# Patient Record
Sex: MALE | Race: WHITE | NOT HISPANIC OR LATINO | Employment: OTHER | URBAN - METROPOLITAN AREA
[De-identification: names, ages, dates, MRNs, and addresses within clinical notes are randomized per-mention and may not be internally consistent; named-entity substitution may affect disease eponyms.]

---

## 2017-03-22 ENCOUNTER — HISTORICAL (OUTPATIENT)
Dept: ADMINISTRATIVE | Facility: HOSPITAL | Age: 37
End: 2017-03-22

## 2017-03-23 ENCOUNTER — HOSPITAL ENCOUNTER (EMERGENCY)
Facility: HOSPITAL | Age: 37
Discharge: HOME | End: 2017-03-23
Attending: EMERGENCY MEDICINE | Admitting: EMERGENCY MEDICINE
Payer: MEDICARE

## 2017-03-23 VITALS
TEMPERATURE: 99 F | RESPIRATION RATE: 17 BRPM | OXYGEN SATURATION: 97 % | HEIGHT: 72 IN | WEIGHT: 182.5 LBS | SYSTOLIC BLOOD PRESSURE: 165 MMHG | BODY MASS INDEX: 24.72 KG/M2 | HEART RATE: 88 BPM | DIASTOLIC BLOOD PRESSURE: 89 MMHG

## 2017-03-23 DIAGNOSIS — K31.84 GASTROPARESIS: Primary | ICD-10-CM

## 2017-03-23 LAB
ALBUMIN SERPL BCP-MCNC: 4.4 G/DL
ALP SERPL-CCNC: 79 U/L
ALT SERPL W/O P-5'-P-CCNC: 19 U/L
ANION GAP SERPL CALC-SCNC: 15 MMOL/L
AST SERPL-CCNC: 19 U/L
BASOPHILS # BLD AUTO: 0.03 K/UL
BASOPHILS NFR BLD: 0.2 %
BILIRUB SERPL-MCNC: 0.4 MG/DL
BUN SERPL-MCNC: 12 MG/DL
CALCIUM SERPL-MCNC: 10 MG/DL
CHLORIDE SERPL-SCNC: 102 MMOL/L
CO2 SERPL-SCNC: 25 MMOL/L
CREAT SERPL-MCNC: 1 MG/DL
DIFFERENTIAL METHOD: ABNORMAL
EOSINOPHIL # BLD AUTO: 0 K/UL
EOSINOPHIL NFR BLD: 0.1 %
ERYTHROCYTE [DISTWIDTH] IN BLOOD BY AUTOMATED COUNT: 15.6 %
EST. GFR  (AFRICAN AMERICAN): >60 ML/MIN/1.73 M^2
EST. GFR  (NON AFRICAN AMERICAN): >60 ML/MIN/1.73 M^2
GLUCOSE SERPL-MCNC: 113 MG/DL
HCT VFR BLD AUTO: 34.3 %
HGB BLD-MCNC: 11.1 G/DL
LIPASE SERPL-CCNC: 21 U/L
LYMPHOCYTES # BLD AUTO: 2.2 K/UL
LYMPHOCYTES NFR BLD: 16.2 %
MCH RBC QN AUTO: 26.4 PG
MCHC RBC AUTO-ENTMCNC: 32.4 %
MCV RBC AUTO: 82 FL
MONOCYTES # BLD AUTO: 1.1 K/UL
MONOCYTES NFR BLD: 8.1 %
NEUTROPHILS # BLD AUTO: 10.1 K/UL
NEUTROPHILS NFR BLD: 75.3 %
PLATELET # BLD AUTO: 516 K/UL
PMV BLD AUTO: 9.8 FL
POTASSIUM SERPL-SCNC: 3.7 MMOL/L
PROT SERPL-MCNC: 7.9 G/DL
RBC # BLD AUTO: 4.2 M/UL
SODIUM SERPL-SCNC: 142 MMOL/L
WBC # BLD AUTO: 13.42 K/UL

## 2017-03-23 PROCEDURE — 63600175 PHARM REV CODE 636 W HCPCS: Performed by: EMERGENCY MEDICINE

## 2017-03-23 PROCEDURE — 85025 COMPLETE CBC W/AUTO DIFF WBC: CPT

## 2017-03-23 PROCEDURE — 96365 THER/PROPH/DIAG IV INF INIT: CPT

## 2017-03-23 PROCEDURE — 99284 EMERGENCY DEPT VISIT MOD MDM: CPT | Mod: 25

## 2017-03-23 PROCEDURE — 96375 TX/PRO/DX INJ NEW DRUG ADDON: CPT

## 2017-03-23 PROCEDURE — 25000003 PHARM REV CODE 250: Performed by: EMERGENCY MEDICINE

## 2017-03-23 PROCEDURE — 99283 EMERGENCY DEPT VISIT LOW MDM: CPT | Mod: ,,, | Performed by: EMERGENCY MEDICINE

## 2017-03-23 PROCEDURE — 80053 COMPREHEN METABOLIC PANEL: CPT

## 2017-03-23 PROCEDURE — 83690 ASSAY OF LIPASE: CPT

## 2017-03-23 RX ORDER — HYDROMORPHONE HYDROCHLORIDE 1 MG/ML
1 INJECTION, SOLUTION INTRAMUSCULAR; INTRAVENOUS; SUBCUTANEOUS
Status: COMPLETED | OUTPATIENT
Start: 2017-03-23 | End: 2017-03-23

## 2017-03-23 RX ORDER — METOCLOPRAMIDE 10 MG/1
10 TABLET ORAL 2 TIMES DAILY PRN
COMMUNITY
End: 2022-09-14 | Stop reason: SDUPTHER

## 2017-03-23 RX ORDER — HEPARIN SODIUM 5000 [USP'U]/ML
5000 INJECTION, SOLUTION INTRAVENOUS; SUBCUTANEOUS
Status: DISCONTINUED | OUTPATIENT
Start: 2017-03-23 | End: 2017-03-23

## 2017-03-23 RX ORDER — ESOMEPRAZOLE MAGNESIUM 40 MG/1
40 CAPSULE, DELAYED RELEASE ORAL 2 TIMES DAILY
COMMUNITY
End: 2022-08-02

## 2017-03-23 RX ORDER — METOCLOPRAMIDE HYDROCHLORIDE 5 MG/ML
10 INJECTION INTRAMUSCULAR; INTRAVENOUS
Status: COMPLETED | OUTPATIENT
Start: 2017-03-23 | End: 2017-03-23

## 2017-03-23 RX ORDER — PROMETHAZINE HYDROCHLORIDE 6.25 MG/5ML
25 SYRUP ORAL EVERY 6 HOURS PRN
COMMUNITY
End: 2022-05-31

## 2017-03-23 RX ORDER — VARENICLINE TARTRATE 1 MG/1
1 TABLET, FILM COATED ORAL 2 TIMES DAILY
COMMUNITY
End: 2022-06-28 | Stop reason: ALTCHOICE

## 2017-03-23 RX ORDER — ERGOCALCIFEROL 1.25 MG/1
1000 CAPSULE ORAL DAILY
COMMUNITY
End: 2022-06-28

## 2017-03-23 RX ORDER — HEPARIN 100 UNIT/ML
100 SYRINGE INTRAVENOUS
Status: DISCONTINUED | OUTPATIENT
Start: 2017-03-23 | End: 2017-03-23 | Stop reason: HOSPADM

## 2017-03-23 RX ADMIN — HEPARIN 100 UNITS: 100 SYRINGE at 08:03

## 2017-03-23 RX ADMIN — PROMETHAZINE HYDROCHLORIDE 25 MG: 25 INJECTION, SOLUTION INTRAMUSCULAR; INTRAVENOUS at 05:03

## 2017-03-23 RX ADMIN — HYDROMORPHONE HYDROCHLORIDE 1 MG: 1 INJECTION, SOLUTION INTRAMUSCULAR; INTRAVENOUS; SUBCUTANEOUS at 05:03

## 2017-03-23 RX ADMIN — SODIUM CHLORIDE 1000 ML: 0.9 INJECTION, SOLUTION INTRAVENOUS at 05:03

## 2017-03-23 RX ADMIN — METOCLOPRAMIDE 10 MG: 5 INJECTION, SOLUTION INTRAMUSCULAR; INTRAVENOUS at 05:03

## 2017-03-23 NOTE — ED AVS SNAPSHOT
[facility]  [address]  [facility] [id number]               Claude Hall   3/23/2017  3:49 AM   ED    Description:  Male : 1980   Department:  [facility]           Your Care was Coordinated By:     Provider Role From To    Odilon Cazares MD Attending Provider 17 0415 --      Reason for Visit     Abdominal Pain           Diagnoses this Visit        Comments    Gastroparesis    -  Primary       ED Disposition     None           To Do List           Follow-up Information     Follow up with your doctor. Call in 1 week.    Why:  As needed      [facility]     [facility] -  Assistance  Registered nurses in the [facility] provide clinical advisement, health education, appointment booking, and other advisory services.  Call for this free service at [phone].             Medications           Message regarding Medications     Verify the changes and/or additions to your medication regime listed below are the same as discussed with your clinician today.  If any of these changes or additions are incorrect, please notify your healthcare provider.        These medications were administered today        Dose Freq    metoclopramide HCl injection 10 mg 10 mg ED 1 Time    Sig: Inject 2 mLs (10 mg total) into the vein ED 1 Time.    Class: Normal    Route: Intravenous    promethazine (PHENERGAN) 25 mg in dextrose 5 % 50 mL IVPB 25 mg ED 1 Time    Sig: Inject 25 mg into the vein ED 1 Time.    Class: Normal    Route: Intravenous    hydromorphone (PF) injection 1 mg 1 mg ED 1 Time    Sig: Inject 1 mL (1 mg total) into the vein ED 1 Time.    Class: Normal    Route: Intravenous    sodium chloride 0.9% bolus 1,000 mL 1,000 mL ED 1 Time    Sig: Inject 1,000 mLs into the vein ED 1 Time.    Class: Normal    Route: Intravenous           Verify that the below list of medications is an accurate representation of the  medications you are currently taking.  If none reported, the list may be blank. If incorrect, please contact your healthcare provider. Carry this list with you in case of emergency.           Current Medications     ibgahjxisi-svwkqdwp-kwclve ala (ODEFSEY) 200-25-25 mg Tab Take by mouth once daily.    ergocalciferol (VITAMIN D2) 50,000 unit Cap Take 1,000 Units by mouth once daily.    esomeprazole (NEXIUM) 40 MG capsule Take 40 mg by mouth 2 (two) times daily.    hydrocodone-acetaminophen  (LORTAB)  mg per tablet Take 1 tablet by mouth every 6 (six) hours as needed for Pain.    metoclopramide HCl (REGLAN) 10 MG tablet Take 10 mg by mouth 2 (two) times daily as needed.    promethazine (PHENERGAN) 6.25 mg/5 mL syrup Take 25 mg by mouth every 6 (six) hours as needed for Nausea.    varenicline (CHANTIX) 1 mg Tab Take 1 mg by mouth 2 (two) times daily.           Clinical Reference Information           Your Vitals Were     BP Pulse Temp Resp Height Weight    171/97 94 98.8 °F (37.1 °C) (Oral) 18 6' (1.829 m) 82.8 kg (182 lb 8 oz)    SpO2 BMI             98% 24.75 kg/m2         Allergies as of 3/23/2017        Reactions    Ativan [Lorazepam] Other (See Comments)    Twitching, involuntary movements.       Immunizations Administered on Date of Encounter - 3/23/2017     None      ED Micro, Lab, POCT     Start Ordered       Status Ordering Provider    03/23/17 0458 03/23/17 0457  Lipase  STAT      Final result     03/23/17 0457 03/23/17 0457  CBC auto differential  STAT      Final result     03/23/17 0457 03/23/17 0457  Comprehensive metabolic panel  Once      Final result       ED Imaging Orders     Start Ordered       Status Ordering Provider    03/23/17 0457 03/23/17 0457  X-Ray Abdomen Flat And Erect  1 time imaging      Final result         Discharge Instructions         Gastroparesis     Gastroparesis means that food and fluids move too slowly out of the stomach into the duodenum.   Gastroparesis (also  called delayed gastric emptying) happens when the stomach takes longer than normal to empty of food. This is due to a problem with motility (the movement of the muscles in the digestive tract). For many people, gastroparesis is a lifelong condition. But treatment can help relieve symptoms and prevent complications. Read on to learn more about gastroparesis and how it can be managed.  How gastroparesis develops  With normal motility, signals from nerves tell the stomach muscles when to contract. These muscles move food from the stomach into the duodenum (the first part of the small bowel). With gastroparesis, the nerves or muscles are damaged. This causes motility to slow down or stop completely. As a result, food cannot move from the stomach properly. This delayed emptying can cause nausea, vomiting, and other symptoms. Malnutrition can result. Bezoars (hardened lumps of food) can form in the stomach and cause other complications as well.  Causes of gastroparesis  Gastroparesis can be caused by any of the following:  · Diabetes  · Surgery involving any of the digestive organs, such as the stomach and bowels  · Certain medicines, such as strong pain medicines (narcotics)  · Certain conditions, such as systemic scleroderma, Parkinson disease, and thyroid disease  · After a viral illness  In many cases, the cause of gastroparesis cannot be found.  Signs and symptoms of gastroparesis  These can include:  · Nausea and vomiting  · Feeling full quickly when eating  · Belly pain  · Heartburn  · Belly bloating  · Weight loss  · Loss of appetite  · High and low blood sugar levels (in people with diabetes)  Diagnosing gastroparesis  Your healthcare provider will ask about your symptoms and health history. Youll also be examined. In addition, blood tests and X-rays are often done to check your health and rule out other problems. To confirm the problem, you may need other tests as well. These can include:  · Upper  endoscopy. This is done to see inside the stomach and duodenum. For the test, an endoscope is used. This is a thin, flexible tube with a tiny camera on the end. Its inserted through the mouth and down into the stomach and duodenum.  · Upper gastrointestinal (GI) series. This is done to take X-rays of the upper GI tract from the mouth to the small bowel. For the test, a substance called barium is used. The barium coats the upper GI tract so that it will show up clearly on X-rays.  · Gastric emptying scan. This is done to measure how quickly food leaves the stomach. For the test, a meal containing a harmless radioactive substance (tracer) is eaten. Then scans of the stomach are done. The tracer shows up clearly on the scans and shows the movement of the food through the stomach.  · Antroduodenal manometry. This test gives pressure measurements of the stomach and small intestine to check how the contractions are working.  · Newer tests. These are being created and include breath tests and wireless capsule studies   Treating gastroparesis  The goal of treatment is to help you manage your condition. Treatment may include one or more of the following:  · Dietary changes. You may need to make changes to your eating habits and daily diet. For instance, your healthcare provider may instruct you to eat small meals throughout the day. Doing this can keep you from feeling full too quickly. You may be placed on a liquid or soft diet. This means youll eat liquid foods or foods that are mashed or put through a . In addition, you may need to avoid foods high in fats and fiber. These can slow digestion. For more help with your diet, your healthcare provider can refer you to a dietitian. In severe cases, you may need a feeding tube. This sends liquid food or medicine directly to your small bowel, bypassing the stomach.  · Treating diabetes. If you are diabetic, it is important to control your blood sugar. High sugar levels  worsen gastroparesis.   · Medicines. These can help manage symptoms, such as nausea and vomiting. They can also improve motility. Each medicine has specific risks and side effects. Your doctor can tell you more about any medicine that is prescribed for you.  · Surgery. You may need to have a tube surgically inserted into the stomach. The tube removes excess air and fluid. This can relieve severe symptoms of nausea and vomiting. In rare cases, other surgery may be needed on the stomach or small bowel. This is to create a new passageway for food to be emptied from the stomach.  · Gastric electrical stimulation. This treatment is done less often and may not be available. Your healthcare provider can tell you more about this treatment if it is a choice for you.  Diabetes and gastroparesis  If you have diabetes, gastroparesis can make it harder to manage your blood sugar level. Youll need to take extra steps in your treatment to prevent complications. Work with your healthcare provider to learn what you can do to protect your health. For more information, contact the American Diabetes Association, www.diabetes.org.   Long-term concerns   With treatment, most people can manage their symptoms and maintain their usual routines. If your symptoms are moderate to severe, you may need to see your healthcare provider more often for checkups. Also, other treatments will likely be needed.  Date Last Reviewed: 7/14/2016 © 2000-2016 The [employer]. [address] [place] [address], [place] [address]. All rights reserved. This information is not intended as a substitute for professional medical care. Always follow your healthcare professional's instructions.          [facility] Sign-Up     Activating your [facility] account is as easy as 1-2-3!     1) Visit [email or web address], select Sign Up Now, enter this activation code and your date of birth, then select Next.  [email or web address]  Expires: 5/7/2017  6:57 AM      2) Create a username  and password to use when you visit [facility] in the future and select a security question in case you lose your password and select Next.    3) Enter your e-mail address and click Sign Up!    Additional Information  If you have questions, please e-mail [email or web address] or call [phone] to talk to our [facility] staff. Remember, [facility] is NOT to be used for urgent needs. For medical emergencies, dial 911.          [facility] complies with applicable Federal civil rights laws and does not discriminate on the basis of race, color, national origin, age, disability, or sex.        Language Assistance Services     ATTENTION: Language assistance services are available, free of charge. Please call [phone].      ATENCIÓN: Si habla español, tiene a tena disposición servicios gratuitos de asistencia lingüística. Llame al [phone].     CHÚ Ý: N?u b?n nói Ti?ng Vi?t, có các d?ch v? h? tr? ngôn ng? mi?n phí dành cho b?n. G?i s? [phone].

## 2017-03-23 NOTE — ED NOTES
hx of gastroparesis, pt reports abd pain that started monday, pt was seen [facility] in [facility] and has appt with [facility] here tomorrow but this hospital was closest and pt cannot stop throwing up-- last emesis 15 min ago and pt reports bile

## 2017-03-23 NOTE — ED PROVIDER NOTES
Encounter Date: 3/23/2017    SCRIBE #1 NOTE: I, Shyanne Long, am scribing for, and in the presence of, Dr. Cazares.       History     Chief Complaint   Patient presents with    Abdominal Pain     hx of gastroparesis, pt reports abd pain that started monday, pt was seen [facility] in [facility] and has appt with [facility] here tomorrow but this hospital was closest and pt cannot stop throwing up-- last emesis 15 min ago and pt reports bile     Review of patient's allergies indicates:   Allergen Reactions    Ativan [lorazepam] Other (See Comments)     Twitching, involuntary movements.      HPI Comments: Time seen by provider: 4:51 AM    This is a 36 y.o. male with a long history of gastroparesis and a PSHx of abdominal pacemaker who presents with complaint of severe abdominal pain, nausea, and vomiting x 3 days. Patient endorses he usually takes Reglan, Nexium, and Phenergan for symptoms. He reports erythromycin makes symptoms worse. He states he is unaware of any factors that cause exacerbations. He denies a history of DM.      The history is provided by the patient.     Past Medical History:   Diagnosis Date    Gastroparesis     HIV (human immunodeficiency virus infection)      History reviewed. No pertinent surgical history.  History reviewed. No pertinent family history.  Social History   Substance Use Topics    Smoking status: Former Smoker     Types: Cigarettes    Smokeless tobacco: None    Alcohol use No     Review of Systems   Constitutional: Negative for fever.   HENT: Negative for nosebleeds.    Eyes: Negative for redness.   Respiratory: Negative for shortness of breath.    Cardiovascular: Negative for chest pain.   Gastrointestinal: Positive for abdominal pain, nausea and vomiting.   Genitourinary: Negative for hematuria.   Musculoskeletal: Negative for neck pain.   Skin: Negative for rash.   Neurological: Negative for speech difficulty.       Physical Exam   Initial Vitals   BP Pulse Resp Temp SpO2   03/23/17 0245  03/23/17 0245 03/23/17 0245 03/23/17 0245 03/23/17 0245   171/97 94 18 98.8 °F (37.1 °C) 98 %     Physical Exam    Nursing note and vitals reviewed.  Constitutional:   Patient appears thin   HENT:   Head: Normocephalic and atraumatic.   Oral airway dry   Eyes: EOM are normal. Pupils are equal, round, and reactive to light.   Cardiovascular: Normal rate, regular rhythm and normal heart sounds.   No murmur heard.  Pulmonary/Chest: Breath sounds normal. No respiratory distress. He has no wheezes. He has no rhonchi. He has no rales.   Port in right upper chest   Abdominal: There is tenderness in the epigastric area.   Subcutaneous device in left lower abdomen, consistent with abdominal pacemaker. Scaphoid abdomen   Musculoskeletal: Normal range of motion.   Neurological: He is alert and oriented to person, place, and time.   Skin: Skin is warm and dry.         ED Course   Procedures  Labs Reviewed - No data to display          Medical Decision Making:   History:   Old Medical Records: I decided to obtain old medical records.  Initial Assessment:   This is an emergent evaluation of a 36 y.o.male patient with presentation of abdominal pain and vomiting. Patient has long history of gastroparesis. Plan is symptomatic control, IV fluids, and labs including lipase.  Independently Interpreted Test(s):   I have ordered and independently interpreted X-rays - see prior notes.  Clinical Tests:   Lab Tests: Ordered and Reviewed  Radiological Study: Ordered and Reviewed            Scribe Attestation:   Scribe #1: I performed the above scribed service and the documentation accurately describes the services I performed. I attest to the accuracy of the note.    Attending Attestation:           Physician Attestation for Scribe:  Physician Attestation Statement for Scribe #1: I, Dr. Cazares, reviewed documentation, as scribed by Shyanne Long in my presence, and it is both accurate and complete.         Attending ED Notes:   Patient  improved after symptomatic treatment in the ED.  Labs unremarkable.  X-ray showed no evidence of obstruction or free air.  Stable for discharge.  Follow-up with [facility] as scheduled.          ED Course     Clinical Impression:   There were no encounter diagnoses.          Odilon Cazares MD  03/29/17 103

## 2017-03-23 NOTE — DISCHARGE INSTRUCTIONS
Gastroparesis     Gastroparesis means that food and fluids move too slowly out of the stomach into the duodenum.   Gastroparesis (also called delayed gastric emptying) happens when the stomach takes longer than normal to empty of food. This is due to a problem with motility (the movement of the muscles in the digestive tract). For many people, gastroparesis is a lifelong condition. But treatment can help relieve symptoms and prevent complications. Read on to learn more about gastroparesis and how it can be managed.  How gastroparesis develops  With normal motility, signals from nerves tell the stomach muscles when to contract. These muscles move food from the stomach into the duodenum (the first part of the small bowel). With gastroparesis, the nerves or muscles are damaged. This causes motility to slow down or stop completely. As a result, food cannot move from the stomach properly. This delayed emptying can cause nausea, vomiting, and other symptoms. Malnutrition can result. Bezoars (hardened lumps of food) can form in the stomach and cause other complications as well.  Causes of gastroparesis  Gastroparesis can be caused by any of the following:  · Diabetes  · Surgery involving any of the digestive organs, such as the stomach and bowels  · Certain medicines, such as strong pain medicines (narcotics)  · Certain conditions, such as systemic scleroderma, Parkinson disease, and thyroid disease  · After a viral illness  In many cases, the cause of gastroparesis cannot be found.  Signs and symptoms of gastroparesis  These can include:  · Nausea and vomiting  · Feeling full quickly when eating  · Belly pain  · Heartburn  · Belly bloating  · Weight loss  · Loss of appetite  · High and low blood sugar levels (in people with diabetes)  Diagnosing gastroparesis  Your healthcare provider will ask about your symptoms and health history. Youll also be examined. In addition, blood tests and X-rays are often done to check  your health and rule out other problems. To confirm the problem, you may need other tests as well. These can include:  · Upper endoscopy. This is done to see inside the stomach and duodenum. For the test, an endoscope is used. This is a thin, flexible tube with a tiny camera on the end. Its inserted through the mouth and down into the stomach and duodenum.  · Upper gastrointestinal (GI) series. This is done to take X-rays of the upper GI tract from the mouth to the small bowel. For the test, a substance called barium is used. The barium coats the upper GI tract so that it will show up clearly on X-rays.  · Gastric emptying scan. This is done to measure how quickly food leaves the stomach. For the test, a meal containing a harmless radioactive substance (tracer) is eaten. Then scans of the stomach are done. The tracer shows up clearly on the scans and shows the movement of the food through the stomach.  · Antroduodenal manometry. This test gives pressure measurements of the stomach and small intestine to check how the contractions are working.  · Newer tests. These are being created and include breath tests and wireless capsule studies   Treating gastroparesis  The goal of treatment is to help you manage your condition. Treatment may include one or more of the following:  · Dietary changes. You may need to make changes to your eating habits and daily diet. For instance, your healthcare provider may instruct you to eat small meals throughout the day. Doing this can keep you from feeling full too quickly. You may be placed on a liquid or soft diet. This means youll eat liquid foods or foods that are mashed or put through a . In addition, you may need to avoid foods high in fats and fiber. These can slow digestion. For more help with your diet, your healthcare provider can refer you to a dietitian. In severe cases, you may need a feeding tube. This sends liquid food or medicine directly to your small bowel,  bypassing the stomach.  · Treating diabetes. If you are diabetic, it is important to control your blood sugar. High sugar levels worsen gastroparesis.   · Medicines. These can help manage symptoms, such as nausea and vomiting. They can also improve motility. Each medicine has specific risks and side effects. Your doctor can tell you more about any medicine that is prescribed for you.  · Surgery. You may need to have a tube surgically inserted into the stomach. The tube removes excess air and fluid. This can relieve severe symptoms of nausea and vomiting. In rare cases, other surgery may be needed on the stomach or small bowel. This is to create a new passageway for food to be emptied from the stomach.  · Gastric electrical stimulation. This treatment is done less often and may not be available. Your healthcare provider can tell you more about this treatment if it is a choice for you.  Diabetes and gastroparesis  If you have diabetes, gastroparesis can make it harder to manage your blood sugar level. Youll need to take extra steps in your treatment to prevent complications. Work with your healthcare provider to learn what you can do to protect your health. For more information, contact the American Diabetes Association, www.diabetes.org.   Long-term concerns   With treatment, most people can manage their symptoms and maintain their usual routines. If your symptoms are moderate to severe, you may need to see your healthcare provider more often for checkups. Also, other treatments will likely be needed.  Date Last Reviewed: 7/14/2016  © 8013-4712 [employer]. [address] [place] [address], [place] [address]. All rights reserved. This information is not intended as a substitute for professional medical care. Always follow your healthcare professional's instructions.

## 2017-03-30 ENCOUNTER — HISTORICAL (OUTPATIENT)
Dept: ADMINISTRATIVE | Facility: HOSPITAL | Age: 37
End: 2017-03-30

## 2017-04-01 ENCOUNTER — HISTORICAL (OUTPATIENT)
Dept: ADMINISTRATIVE | Facility: HOSPITAL | Age: 37
End: 2017-04-01

## 2017-05-04 ENCOUNTER — HISTORICAL (OUTPATIENT)
Dept: ADMINISTRATIVE | Facility: HOSPITAL | Age: 37
End: 2017-05-04

## 2017-05-16 ENCOUNTER — HISTORICAL (OUTPATIENT)
Dept: ADMINISTRATIVE | Facility: HOSPITAL | Age: 37
End: 2017-05-16

## 2017-06-28 ENCOUNTER — HISTORICAL (OUTPATIENT)
Dept: ADMINISTRATIVE | Facility: HOSPITAL | Age: 37
End: 2017-06-28

## 2017-07-05 ENCOUNTER — HISTORICAL (OUTPATIENT)
Dept: ADMINISTRATIVE | Facility: HOSPITAL | Age: 37
End: 2017-07-05

## 2017-10-07 ENCOUNTER — HOSPITAL ENCOUNTER (OUTPATIENT)
Dept: MEDSURG UNIT | Facility: HOSPITAL | Age: 37
End: 2017-10-08
Attending: INTERNAL MEDICINE | Admitting: FAMILY MEDICINE

## 2017-10-23 ENCOUNTER — HISTORICAL (OUTPATIENT)
Dept: ADMINISTRATIVE | Facility: HOSPITAL | Age: 37
End: 2017-10-23

## 2017-12-28 ENCOUNTER — HISTORICAL (OUTPATIENT)
Dept: OTOLARYNGOLOGY | Facility: CLINIC | Age: 37
End: 2017-12-28

## 2018-11-14 ENCOUNTER — HISTORICAL (OUTPATIENT)
Dept: ADMINISTRATIVE | Facility: HOSPITAL | Age: 38
End: 2018-11-14

## 2018-11-14 LAB
ABS NEUT (OLG): 7.28 X10(3)/MCL (ref 2.1–9.2)
ALBUMIN SERPL-MCNC: 4 GM/DL (ref 3.4–5)
ALBUMIN/GLOB SERPL: 1 RATIO (ref 1–2)
ALP SERPL-CCNC: 86 UNIT/L (ref 45–117)
ALT SERPL-CCNC: 23 UNIT/L (ref 12–78)
APPEARANCE, UA: CLEAR
AST SERPL-CCNC: 15 UNIT/L (ref 15–37)
BACTERIA #/AREA URNS AUTO: ABNORMAL /[HPF]
BASOPHILS # BLD AUTO: 0.07 X10(3)/MCL
BASOPHILS NFR BLD AUTO: 1 %
BILIRUB SERPL-MCNC: 0.5 MG/DL (ref 0.2–1)
BILIRUB UR QL STRIP: NEGATIVE
BILIRUBIN DIRECT+TOT PNL SERPL-MCNC: 0.2 MG/DL
BILIRUBIN DIRECT+TOT PNL SERPL-MCNC: 0.3 MG/DL
BUN SERPL-MCNC: 11 MG/DL (ref 7–18)
CALCIUM SERPL-MCNC: 8.9 MG/DL (ref 8.5–10.1)
CD3+CD4+ CELLS # SPEC: 938 UNIT/L (ref 589–1505)
CD3+CD4+ CELLS NFR BLD: 41 % (ref 31–59)
CHLORIDE SERPL-SCNC: 104 MMOL/L (ref 98–107)
CHOLEST SERPL-MCNC: 181 MG/DL
CHOLEST/HDLC SERPL: 2.7 {RATIO} (ref 0–5)
CO2 SERPL-SCNC: 25 MMOL/L (ref 21–32)
COLOR UR: YELLOW
CREAT SERPL-MCNC: 1 MG/DL (ref 0.6–1.3)
DEPRECATED CALCIDIOL+CALCIFEROL SERPL-MC: 40.3 NG/ML (ref 30–80)
EOSINOPHIL # BLD AUTO: 0.26 X10(3)/MCL
EOSINOPHIL NFR BLD AUTO: 2 %
ERYTHROCYTE [DISTWIDTH] IN BLOOD BY AUTOMATED COUNT: 16.2 % (ref 11.5–14.5)
EST. AVERAGE GLUCOSE BLD GHB EST-MCNC: 111 MG/DL
GLOBULIN SER-MCNC: 3.9 GM/ML (ref 2.3–3.5)
GLUCOSE (UA): NORMAL
GLUCOSE SERPL-MCNC: 103 MG/DL (ref 74–106)
HBA1C MFR BLD: 5.5 % (ref 4.2–6.3)
HCT VFR BLD AUTO: 41.3 % (ref 40–51)
HCV AB SERPL QL IA: NONREACTIVE
HDLC SERPL-MCNC: 68 MG/DL
HGB BLD-MCNC: 13.1 GM/DL (ref 13.5–17.5)
HGB UR QL STRIP: NEGATIVE
HYALINE CASTS #/AREA URNS LPF: ABNORMAL /[LPF]
IMM GRANULOCYTES # BLD AUTO: 0.02 10*3/UL
IMM GRANULOCYTES NFR BLD AUTO: 0 %
KETONES UR QL STRIP: NEGATIVE
LDLC SERPL CALC-MCNC: 94 MG/DL (ref 0–130)
LEUKOCYTE ESTERASE UR QL STRIP: NEGATIVE
LYMPHOCYTES # BLD AUTO: 2.35 X10(3)/MCL
LYMPHOCYTES # BLD AUTO: 2354 UNIT/L (ref 1260–5520)
LYMPHOCYTES NFR BLD AUTO: 22 % (ref 13–40)
LYMPHOCYTES NFR LN MANUAL: 22 % (ref 28–48)
LYMPHOMA - T-CELL MARKERS SPEC-IMP: ABNORMAL
MCH RBC QN AUTO: 26.5 PG (ref 26–34)
MCHC RBC AUTO-ENTMCNC: 31.7 GM/DL (ref 31–37)
MCV RBC AUTO: 83.4 FL (ref 80–100)
MONOCYTES # BLD AUTO: 0.74 X10(3)/MCL
MONOCYTES NFR BLD AUTO: 7 % (ref 4–12)
NEG CONT SPOT COUNT: NORMAL
NEUTROPHILS # BLD AUTO: 7.28 X10(3)/MCL
NEUTROPHILS NFR BLD AUTO: 68 X10(3)/MCL
NITRITE UR QL STRIP: NEGATIVE
PANEL A SPOT COUNT: 0
PANEL B SPOT COUNT: 0
PH UR STRIP: 6 [PH] (ref 4.5–8)
PLATELET # BLD AUTO: 281 X10(3)/MCL (ref 130–400)
PMV BLD AUTO: 9.9 FL (ref 7.4–10.4)
POS CONT SPOT COUNT: NORMAL
POTASSIUM SERPL-SCNC: 3.9 MMOL/L (ref 3.5–5.1)
PROT SERPL-MCNC: 7.9 GM/DL (ref 6.4–8.2)
PROT UR QL STRIP: 10 MG/DL
RBC # BLD AUTO: 4.95 X10(6)/MCL (ref 4.5–5.9)
RBC #/AREA URNS AUTO: ABNORMAL /[HPF]
SODIUM SERPL-SCNC: 138 MMOL/L (ref 136–145)
SP GR UR STRIP: 1.03 (ref 1–1.03)
SQUAMOUS #/AREA URNS LPF: ABNORMAL /[LPF]
T PALLIDUM AB SER QL: NONREACTIVE
T-SPOT.TB: NORMAL
TRIGL SERPL-MCNC: 97 MG/DL
TSH SERPL-ACNC: 0.94 MIU/L (ref 0.36–3.74)
UROBILINOGEN UR STRIP-ACNC: NORMAL
VLDLC SERPL CALC-MCNC: 19 MG/DL
WBC # BLD AUTO: ABNORMAL /MM3 (ref 4500–11500)
WBC # SPEC AUTO: 10.7 X10(3)/MCL (ref 4.5–11)
WBC #/AREA URNS AUTO: ABNORMAL /HPF

## 2020-01-06 ENCOUNTER — HISTORICAL (OUTPATIENT)
Dept: ADMINISTRATIVE | Facility: HOSPITAL | Age: 40
End: 2020-01-06

## 2020-08-07 ENCOUNTER — HISTORICAL (OUTPATIENT)
Dept: ADMINISTRATIVE | Facility: HOSPITAL | Age: 40
End: 2020-08-07

## 2021-11-18 ENCOUNTER — HISTORICAL (OUTPATIENT)
Dept: ADMINISTRATIVE | Facility: HOSPITAL | Age: 41
End: 2021-11-18

## 2021-11-18 LAB
ABS NEUT (OLG): 5.3 X10(3)/MCL (ref 2.1–9.2)
ALBUMIN SERPL-MCNC: 4.4 GM/DL (ref 3.5–5)
ALBUMIN/GLOB SERPL: 1 RATIO (ref 1.1–2)
ALP SERPL-CCNC: 112 UNIT/L (ref 40–150)
ALT SERPL-CCNC: 73 UNIT/L (ref 0–55)
ANISOCYTOSIS BLD QL SMEAR: NORMAL
APPEARANCE, UA: CLEAR
AST SERPL-CCNC: 65 UNIT/L (ref 5–34)
BACTERIA #/AREA URNS AUTO: ABNORMAL /HPF
BASOPHILS # BLD AUTO: 0 X10(3)/MCL (ref 0–0.2)
BASOPHILS NFR BLD AUTO: 1 %
BILIRUB SERPL-MCNC: 0.4 MG/DL
BILIRUB UR QL STRIP: NEGATIVE
BILIRUBIN DIRECT+TOT PNL SERPL-MCNC: 0.2 MG/DL (ref 0–0.5)
BILIRUBIN DIRECT+TOT PNL SERPL-MCNC: 0.2 MG/DL (ref 0–0.8)
BUN SERPL-MCNC: 14.1 MG/DL (ref 8.9–20.6)
CALCIUM SERPL-MCNC: 10 MG/DL (ref 8.7–10.5)
CD3+CD4+ CELLS # SPEC: 629 UNIT/L (ref 589–1505)
CD3+CD4+ CELLS NFR BLD: 38.1 % (ref 31–59)
CHLORIDE SERPL-SCNC: 99 MMOL/L (ref 98–107)
CHOLEST SERPL-MCNC: 255 MG/DL
CHOLEST/HDLC SERPL: 3 {RATIO} (ref 0–5)
CO2 SERPL-SCNC: 27 MMOL/L (ref 22–29)
COLOR UR: YELLOW
CREAT SERPL-MCNC: 1.1 MG/DL (ref 0.73–1.18)
DEPRECATED CALCIDIOL+CALCIFEROL SERPL-MC: 16 NG/ML (ref 30–80)
EOSINOPHIL # BLD AUTO: 0 X10(3)/MCL (ref 0–0.9)
EOSINOPHIL NFR BLD AUTO: 0 %
ERYTHROCYTE [DISTWIDTH] IN BLOOD BY AUTOMATED COUNT: 18.6 % (ref 11.5–14.5)
EST CREAT CLEARANCE SER (OHS): 94.95 ML/MIN
EST. AVERAGE GLUCOSE BLD GHB EST-MCNC: 102.5 MG/DL
GLOBULIN SER-MCNC: 4.5 GM/DL (ref 2.4–3.5)
GLUCOSE (UA): NEGATIVE
GLUCOSE SERPL-MCNC: 100 MG/DL (ref 74–100)
HBA1C MFR BLD: 5.2 %
HCT VFR BLD AUTO: 36.7 % (ref 40–51)
HCV AB SERPL QL IA: REACTIVE
HDLC SERPL-MCNC: 78 MG/DL (ref 35–60)
HGB BLD-MCNC: 10.6 GM/DL (ref 13.5–17.5)
HGB UR QL STRIP: NEGATIVE
HYALINE CASTS #/AREA URNS LPF: ABNORMAL /LPF
IMM GRANULOCYTES # BLD AUTO: 0.02 10*3/UL
IMM GRANULOCYTES NFR BLD AUTO: 0 %
KETONES UR QL STRIP: NEGATIVE
LDLC SERPL CALC-MCNC: 151 MG/DL (ref 50–140)
LEUKOCYTE ESTERASE UR QL STRIP: NEGATIVE
LYMPHOCYTES # BLD AUTO: 1.6 X10(3)/MCL (ref 0.6–4.6)
LYMPHOCYTES # BLD AUTO: 1650 UNIT/L (ref 1260–5520)
LYMPHOCYTES NFR BLD AUTO: 22 %
LYMPHOCYTES NFR LN MANUAL: 22 % (ref 28–48)
MCH RBC QN AUTO: 21.8 PG (ref 26–34)
MCHC RBC AUTO-ENTMCNC: 28.9 GM/DL (ref 31–37)
MCV RBC AUTO: 75.4 FL (ref 80–100)
MICROCYTES BLD QL SMEAR: NORMAL
MONOCYTES # BLD AUTO: 0.5 X10(3)/MCL (ref 0.1–1.3)
MONOCYTES NFR BLD AUTO: 6 %
NEG CONT SPOT COUNT: NORMAL
NEUTROPHILS # BLD AUTO: 5.3 X10(3)/MCL (ref 2.1–9.2)
NEUTROPHILS NFR BLD AUTO: 71 %
NITRITE UR QL STRIP: NEGATIVE
NRBC BLD AUTO-RTO: 0 % (ref 0–0.2)
PANEL A SPOT COUNT: 0
PANEL B SPOT COUNT: 1
PH UR STRIP: 6.5 [PH] (ref 4.5–8)
PLATELET # BLD AUTO: 318 X10(3)/MCL (ref 130–400)
PLATELET # BLD EST: ADEQUATE 10*3/UL
PMV BLD AUTO: 9.9 FL (ref 7.4–10.4)
POS CONT SPOT COUNT: NORMAL
POTASSIUM SERPL-SCNC: 4.3 MMOL/L (ref 3.5–5.1)
PROT SERPL-MCNC: 8.9 GM/DL (ref 6.4–8.3)
PROT UR QL STRIP: 20 MG/DL
RBC # BLD AUTO: 4.87 X10(6)/MCL (ref 4.5–5.9)
RBC #/AREA URNS AUTO: ABNORMAL /HPF
RBC MORPH BLD: NORMAL
SODIUM SERPL-SCNC: 139 MMOL/L (ref 136–145)
SP GR UR STRIP: 1.03 (ref 1–1.03)
SQUAMOUS #/AREA URNS LPF: ABNORMAL /LPF
T PALLIDUM AB SER QL: NONREACTIVE
T-SPOT.TB: NORMAL
TRIGL SERPL-MCNC: 128 MG/DL (ref 34–140)
TSH SERPL-ACNC: 0.65 UIU/ML (ref 0.35–4.94)
UROBILINOGEN UR STRIP-ACNC: 3 MG/DL
VLDLC SERPL CALC-MCNC: 26 MG/DL
WBC # BLD AUTO: 7500 /MM3 (ref 4500–11500)
WBC # SPEC AUTO: 7.5 X10(3)/MCL (ref 4.5–11)
WBC #/AREA URNS AUTO: ABNORMAL /HPF

## 2022-02-16 ENCOUNTER — HISTORICAL (OUTPATIENT)
Dept: ADMINISTRATIVE | Facility: HOSPITAL | Age: 42
End: 2022-02-16

## 2022-02-16 LAB
ABS NEUT (OLG): 3.42 (ref 2.1–9.2)
ALBUMIN SERPL-MCNC: 3.8 G/DL (ref 3.5–5)
ALBUMIN/GLOB SERPL: 1.1 {RATIO} (ref 1.1–2)
ALP SERPL-CCNC: 106 U/L (ref 40–150)
ALT SERPL-CCNC: 48 U/L (ref 0–55)
AST SERPL-CCNC: 30 U/L (ref 5–34)
BASOPHILS # BLD AUTO: 0.1 10*3/UL (ref 0–0.2)
BASOPHILS NFR BLD AUTO: 1 %
BILIRUB SERPL-MCNC: 0.4 MG/DL
BILIRUBIN DIRECT+TOT PNL SERPL-MCNC: 0.2 (ref 0–0.5)
BILIRUBIN DIRECT+TOT PNL SERPL-MCNC: 0.2 (ref 0–0.8)
BUN SERPL-MCNC: 8.8 MG/DL (ref 8.9–20.6)
CALCIUM SERPL-MCNC: 9.4 MG/DL (ref 8.7–10.5)
CHLORIDE SERPL-SCNC: 104 MMOL/L (ref 98–107)
CHOLEST SERPL-MCNC: 183 MG/DL
CHOLEST/HDLC SERPL: 4 {RATIO} (ref 0–5)
CO2 SERPL-SCNC: 26 MMOL/L (ref 22–29)
CREAT SERPL-MCNC: 0.93 MG/DL (ref 0.73–1.18)
DEPRECATED CALCIDIOL+CALCIFEROL SERPL-MC: 56.6 NG/ML (ref 30–80)
EOSINOPHIL # BLD AUTO: 0.3 10*3/UL (ref 0–0.9)
EOSINOPHIL NFR BLD AUTO: 4 %
ERYTHROCYTE [DISTWIDTH] IN BLOOD BY AUTOMATED COUNT: 18.5 % (ref 11.5–14.5)
FLAG2 (OHS): 50
FLAG3 (OHS): 80
FLAGS (OHS): 80
GLOBULIN SER-MCNC: 3.6 G/DL (ref 2.4–3.5)
GLUCOSE SERPL-MCNC: 100 MG/DL (ref 74–100)
HCT VFR BLD AUTO: 32.2 % (ref 40–51)
HDLC SERPL-MCNC: 45 MG/DL (ref 35–60)
HEMOLYSIS INTERF INDEX SERPL-ACNC: <0
HGB BLD-MCNC: 9.8 G/DL (ref 13.5–17.5)
ICTERIC INTERF INDEX SERPL-ACNC: 0
IMM GRANULOCYTES # BLD AUTO: 0.01 10*3/UL
IMM GRANULOCYTES NFR BLD AUTO: 0 %
IMM. NE 2 SUSPECT FLAG (OHS): 10
LDLC SERPL CALC-MCNC: 116 MG/DL (ref 50–140)
LIPEMIC INTERF INDEX SERPL-ACNC: 3
LOW EVENT # SUSPECT FLAG (OHS): 100
LYMPHOCYTES # BLD AUTO: 2.4 10*3/UL (ref 0.6–4.6)
LYMPHOCYTES NFR BLD AUTO: 36 %
MANUAL DIFF? (OHS): NO
MCH RBC QN AUTO: 21.8 PG (ref 26–34)
MCHC RBC AUTO-ENTMCNC: 30.4 G/DL (ref 31–37)
MCV RBC AUTO: 71.7 FL (ref 80–100)
MO BLASTS SUSPECT FLAG (OHS): 40
MONOCYTES # BLD AUTO: 0.5 10*3/UL (ref 0.1–1.3)
MONOCYTES NFR BLD AUTO: 7 %
NEUTROPHILS # BLD AUTO: 3.42 10*3/UL (ref 2.1–9.2)
NEUTROPHILS NFR BLD AUTO: 52 %
NRBC BLD AUTO-RTO: 0 % (ref 0–0.2)
PLATELET # BLD AUTO: 297 10*3/UL (ref 130–400)
PLATELET CLUMPS SUSPECT FLAG (OHS): 10
PMV BLD AUTO: 9.6 FL (ref 7.4–10.4)
POTASSIUM SERPL-SCNC: 4 MMOL/L (ref 3.5–5.1)
PROT SERPL-MCNC: 7.4 G/DL (ref 6.4–8.3)
RBC # BLD AUTO: 4.49 10*6/UL (ref 4.5–5.9)
SODIUM SERPL-SCNC: 137 MMOL/L (ref 136–145)
TRIGL SERPL-MCNC: 110 MG/DL (ref 34–140)
VLDLC SERPL CALC-MCNC: 22 MG/DL
WBC # SPEC AUTO: 6.6 10*3/UL (ref 4.5–11)
ZZGIANT PLATELETS (OHS): 20

## 2022-02-17 LAB
AGE: NORMAL
CD3+CD4+ CELLS # SPEC: 969 /UL (ref 589–1505)
CD3+CD4+ CELLS NFR BLD: 40.8 % (ref 31–59)
LYMPHOCYTES # BLD AUTO: 2376 10*3/UL (ref 1260–5520)
LYMPHOCYTES NFR LN MANUAL: 36 % (ref 28–48)
LYMPHOMA - T-CELL MARKERS SPEC-IMP: NORMAL
WBC # BLD AUTO: 6600 10*3/UL (ref 4500–11500)

## 2022-04-07 ENCOUNTER — HISTORICAL (OUTPATIENT)
Dept: ADMINISTRATIVE | Facility: HOSPITAL | Age: 42
End: 2022-04-07
Payer: COMMERCIAL

## 2022-04-23 VITALS
HEIGHT: 72 IN | WEIGHT: 156.75 LBS | BODY MASS INDEX: 21.23 KG/M2 | SYSTOLIC BLOOD PRESSURE: 139 MMHG | OXYGEN SATURATION: 98 % | DIASTOLIC BLOOD PRESSURE: 85 MMHG

## 2022-05-02 NOTE — HISTORICAL [facility] CERNER
This is a historical note converted from Judy. Formatting and pictures may have been removed.  Please reference Judy for original formatting and attached multimedia. Chief Complaint  b20 f/u  History of Present Illness  RAUL is a 40 yo WM presenting today for HIV f/u visit.? He tells me that he has been off Descovy & Pifeltro X 6 days now due to insurance issues.? Last labs 1/15/21 VL <20, Cd4 812.? Will update labs today.? He is amenable to flu vax today.? Gastroparesis is controlled & he is feeling well.? Will be graduating from [employer] this semester & continuing education moving forward.? Follow-up for anal dysplasia has been interrupted due to COVID pandemic, appreciates referral to Dr. ALBIN Armas office for closer f/u.? Will send referral.? He remains in monogamous relationship with Angus & declines need for repeat STI screenings at this juncture.? All questions answered & concerns addressed.  ?  2/18/21  RAUL is a 41 yo WM presenting today for HIV f/u visit.? He reports 100% adherent to Descovy & Pifeltro, tolerating well with viral suppression.? Last labs 1/15/21 VL <20, CD4 812. He remains in monogamous relationship with Manas who is HIV negative & on PrEP.? He tells me that his GI status is well controlled, the gastric pacer is working well.? He has been in contact with [facility], but has not been seen in >1 year.? He will call to schedule f/u, phone # provided.? He remains in care with Cameron Reyes for counseling and Antonia Lewis for  medication management.? He is doing well in school at [employer], looking to transition to bachelors program next spring. He will call [facility] Dr. Louis office for f/u, last visit 12/2019.? No concerns voiced today.? Immunizations UTD.  ?   9/14/20  RAUL is a 41 yo HIV + WM evaluated by audio-video telemedicine due to COVID-19 pandemic.? He is located at home, and I, the provider, am located at an approved non-[facility] location.? We are both located in [place],  the state in which I am licensed to practice.? The patient consents to telemedicine visit and is the only individual online.??The patient (or patients representative) stated that they understood and accepted the privacy and security risks to their information at their location.  He reports 100% adherent to Odefsey, tolerates well & remains virally suppressed despite need for high dose PPI.? He is amenable to switching ART to newer generation NNRTI that will not interact with PPI.? Will switch to Descovy & Pifeltro with close monitoring.? He prefers to fill at [facility] instead of [facility].? Will send prescription today.? He is busy in school, no concerns voiced.? He tells me that he is gaining weight and doing well overall.  Review of Systems  ?  ?  Constitutional: negative except as stated in HPI  Eye: negative except as stated in HPI  ENMT: negative except as stated in HPI  Respiratory: negative except as stated in HPI  Cardiovascular: negative except as stated in HPI  Gastrointestinal: negative except as stated in HPI  Genitourinary: negative except as stated in HPI  Hema/Lymph: negative except as stated in HPI  Endocrine: negative except as stated in HPI  Immunologic: negative except as stated in HPI  Musculoskeletal: negative except as stated in HPI  Integumentary: negative except as stated in HPI  Neurologic: negative except as stated in HPI  ?   All Other ROS_ ?negative except as stated in HPI  Physical Exam  Vitals & Measurements  T:?36.8? ?C (Oral)? HR:?82(Peripheral)? RR:?18? BP:?139/85?  HT:?182.80?cm? WT:?71.100?kg? BMI:?21.28?  ?  ?  General: AAO X 4, afebrile  Eye: no icterus  HENT: oropharynx clear  Neck: supple  Respiratory: BBS CTA, non-labored, symmetrical  Cardiovascular: S1S2, RRR  Gastrointestinal BS + 4 quadrants, NTND, soft, no organomegaly  Genitourinary: non-tender  Lymphatics: no lymphadenopathy  Musculoskeletal: MAEW, steady gait  Integumentary: WDI  Neurologic: CN II-XII  intact  Assessment/Plan  1.?HIV?B20  adherence/sexual health counseling done  resume Descovy 1 po daily & Pifeltro 100mg daily  labs today  RTC?3 months with?Mary Ann via telemed  keep f/u with?PCP & MH providers?  Ordered:  doravirine, 100 mg = 1 tab(s), Oral, Daily, # 30 tab(s), 3 Refill(s), Pharmacy: "[facility] STORE #[id number], 182.8, cm, Height/Length Dosing, 11/18/21 14:40:00 CST, 71.1, kg, Weight Dosing, 11/18/21 14:40:00 CST  emtricitabine-tenofovir, 1 tab(s), Oral, Daily, # 30 tab(s), 3 Refill(s), Pharmacy: [facility] #[id number], 182.8, cm, Height/Length Dosing, 11/18/21 14:40:00 CST, 71.1, kg, Weight Dosing, 11/18/21 14:40:00 CST  1160F- Medication reconciliation completed during visit, HIV  GASTROPARESIS  Need for vaccination  Anal dysplasia, [facility], 11/18/21 14:46:00 CST  Cd4 Lymphocytes., Routine collect, 11/18/21 14:57:00 CST, Blood, Stop date 11/18/21 14:57:00 CST, Lab Collect, HIV, 11/18/21 14:57:00 CST  Willi Wellington, Trich vag [id number], Now collect, Urine, 11/18/21 17:12:00 CST, Stop date 11/18/21 17:12:00 CST, Nurse collect, HIV, 11/18/21 17:12:00 CST  Clinic Follow up, *Est. 02/18/22 3:00:00 CST, Order for future visit, HIV, [facility]  Office/Outpatient Visit Level 4 Established 21716 PC, HIV  GASTROPARESIS  Need for vaccination  Anal dysplasia, [facility], 11/18/21 14:46:00 CST  RNA, PCR(NonGraph)rfx/GenoPRIme(R)-[facility] [id number], Routine collect, 11/18/21 14:57:00 CST, Blood, Stop date 11/18/21 14:57:00 CST, Lab Collect, HIV, 11/18/21 14:57:00 CST  T Spot Test for M. tuberculosis, Routine collect, 11/18/21 14:57:00 CST, Blood, Stop date 11/18/21 14:57:00 CST, Lab Collect, HIV, 11/18/21 14:57:00 CST  T Spot Test for M. tuberculosis, Routine collect, 11/18/21 14:57:00 CST, Blood, Stop date 11/18/21 14:57:00 CST, Lab Collect, HIV, 11/18/21 14:57:00 CST  Vaccines initial, 11/18/21 14:56:00 CST, [facility], Routine, 11/18/21 14:56:00 CST, HIV  GASTROPARESIS   Anal dysplasia  Need for vaccination  ?  2.?GASTROPARESIS?K31.84  controlled  cont Nexium 40mg daily, Reglan 10mg po qid as per GI recommendations  Ordered:  1160F- Medication reconciliation completed during visit, HIV  GASTROPARESIS  Need for vaccination  Anal dysplasia, [facility], 11/18/21 14:46:00 CST  Office/Outpatient Visit Level 4 Established 23845 PC, HIV  GASTROPARESIS  Need for vaccination  Anal dysplasia, [facility], 11/18/21 14:46:00 CST  Vaccines initial, 11/18/21 14:56:00 CST, [facility], Routine, 11/18/21 14:56:00 CST, HIV  GASTROPARESIS  Anal dysplasia  Need for vaccination  ?  3.?Anal dysplasia?K62.82  ?Refer to Dr. ALBIN Hayes for evaluation & treatment  Ordered:  1160F- Medication reconciliation completed during visit, HIV  GASTROPARESIS  Need for vaccination  Anal dysplasia, [facility], 11/18/21 14:46:00 CST  Office/Outpatient Visit Level 4 Established 66192 PC, HIV  GASTROPARESIS  Need for vaccination  Anal dysplasia, [facility], 11/18/21 14:46:00 CST  Vaccines initial, 11/18/21 14:56:00 CST, [facility], Routine, 11/18/21 14:56:00 CST, HIV  GASTROPARESIS  Anal dysplasia  Need for vaccination  ?  4.?Need for vaccination?Z23  ?Flu vax today  Ordered:  1160F- Medication reconciliation completed during visit, HIV  GASTROPARESIS  Need for vaccination  Anal dysplasia, [facility], 11/18/21 14:46:00 CST  Office/Outpatient Visit Level 4 Established 37452 PC, HIV  GASTROPARESIS  Need for vaccination  Anal dysplasia, [facility], 11/18/21 14:46:00 CST  Vaccines initial, 11/18/21 14:56:00 CST, [facility], Routine, 11/18/21 14:56:00 CST, HIV  GASTROPARESIS  Anal dysplasia  Need for vaccination  ?  Referrals  Clinic Follow up, *Est. 02/18/22 3:00:00 CST, Order for future visit, HIV, [facility]   Problem List/Past Medical History  Ongoing  Acute vomiting  Dysphagia  GASTROPARESIS  HIV  Hypertension  Nausea & vomiting(  Confirmed  )  Tobacco  user  Historical  Calcaneal fracture  Enteritis  Gastroparesis syndrome  Tobacco abuse(  Confirmed  )  Tobacco user  Procedure/Surgical History  Drainage of Right Upper Leg Skin, External Approach (02/20/2020)  Incision and drainage of abscess (eg, carbuncle, suppurative hidradenitis, cutaneous or subcutaneous abscess, cyst, furuncle, or paronychia); complicated or multiple (02/20/2020)  Drainage of Face Skin, External Approach (10/09/2018)  Incision and drainage of abscess (eg, carbuncle, suppurative hidradenitis, cutaneous or subcutaneous abscess, cyst, furuncle, or paronychia); complicated or multiple (10/09/2018)  Drainage of Face Skin, External Approach (03/04/2018)  Incision and drainage of abscess (eg, carbuncle, suppurative hidradenitis, cutaneous or subcutaneous abscess, cyst, furuncle, or paronychia); simple or single (03/04/2018)  Drainage of Face Skin, External Approach (01/06/2018)  Incision and drainage of abscess (eg, carbuncle, suppurative hidradenitis, cutaneous or subcutaneous abscess, cyst, furuncle, or paronychia); complicated or multiple (01/06/2018)  Drainage of Buccal Mucosa, External Approach, Diagnostic (11/28/2017)  Drainage of Lower Lip, External Approach, Diagnostic (11/28/2017)  Incision & Drainage (ENT) (None) (11/28/2017)  Hemorrhoid operation (04/20/2017)  Diverticulectomy of hypopharynx or esophagus, with or without myotomy; cervical approach (10/10/2016)  Esophagoscopy (10/10/2016)  Esophagoscopy, flexible, transoral; diagnostic, including collection of specimen(s) by brushing or washing, when performed (separate procedure) (10/10/2016)  Excision of Esophagus, Via Natural or Artificial Opening Endoscopic (10/10/2016)  Inspection of Upper Intestinal Tract, Via Natural or Artificial Opening Endoscopic (10/10/2016)  Laryngoscopy (10/10/2016)  Zenkers Diverticulum Resection (None) (10/10/2016)  gastric stimulator implant (07/25/2016)  Esophagogastroduodenoscopy, flexible, transoral;  diagnostic, including collection of specimen(s) by brushing or washing, when performed (separate procedure) (04/11/2016)  Inspection of Upper Intestinal Tract, Via Natural or Artificial Opening Endoscopic (04/11/2016)  Open treatment of calcaneal fracture, includes internal fixation, when performed; (03/22/2016)  ORIF Calcaneous (Heel) (Right) (03/22/2016)  Strapping; shoulder (eg, Velpeau) (03/02/2016)  Colonoscopy (09/18/2015)  Colonoscopy (09/18/2015)  Colonoscopy, flexible; diagnostic, including collection of specimen(s) by brushing or washing, when performed (separate procedure) (09/18/2015)  Transfusion of packed cells (06/26/2015)  Transfusion, blood or blood components (06/26/2015)  Biopsy Gastrointestional (09/23/2014)  Esophageal dilatation (09/23/2014)  Esophagogastroduodenoscopy (09/23/2014)  Esophagogastroduodenoscopy [EGD] with closed biopsy (09/23/2014)  Esophagogastroduodenoscopy, flexible, transoral; with biopsy, single or multiple (09/23/2014)  Barium swallow (2014)  Gastric emptying study (2014)  IV central route (11/23/2013)  mediport placement (11/01/2013)  Colonoscopy (2010)  Splenectomy; total (separate procedure) (1997)  Colonoscopy   Medications  amphetamine-dextroamphetamine 30 mg oral tablet, 90 mg= 3 tab(s), Oral, BID  clonazePAM 0.5 mg oral tablet, 0.5 mg= 1 tab(s), Oral, BID  Descovy 200 mg-25 mg oral tablet, 1 tab(s), Oral, Daily, 3 refills  Descovy 200 mg-25 mg oral tablet, 1 tab(s), Oral, Daily, 2 refills  Dilaudid 2 mg/mL injectable solution, 1 mg= 0.5 mL, IV, Once  Heparin Flush 100 U/mL - 5 mL, 500 units= 5 mL, IV Push, Once-NOW  heparin flush 100 units/mL (500 Unit Flush), 100 units= 1 mL, IV, Now  IVF Normal Saline NS Bolus 1000cc, 1000 mL, IV, Now  IVF Normal Saline NS Bolus 1000cc, 1000 mL, IV, Once  metoclopramide 10 mg oral tablet, See Instructions, 1 refills  NexIUM 40 mg oral delayed release capsule, 40 mg= 1 cap(s), Oral, BID, 1 refills  Pifeltro 100 mg oral tablet, 100  mg= 1 tab(s), Oral, Daily, 3 refills  sertraline 100 mg oral tablet, 100 mg= 1 tab(s), Oral, Daily  traZODONE 50 mg oral tablet ( Desyrel ), 50 mg= 1 tab(s), Oral, BID  Vitamin D 1,000 Units Tab, 1 tab(s), Oral, Daily  Allergies  Ativan?(violent twitching, increased anxiety)  Social History  Abuse/Neglect  No, No, Yes, 2021  Alcohol - Denies Alcohol Use, 2014  Current, 1-2 times per year, 2018  Employment/School  Employed, Highest education level: Some college., 10/01/2015  Exercise  Home/Environment  Lives with Significant other. Living situation: Home/Independent. Alcohol abuse in household: No. Substance abuse in household: No. Smoker in household: No. Injuries/Abuse/Neglect in household: No. Feels unsafe at home: No. Safe place to go: Yes., 2018  Nutrition/Health  Regular, Low sugar, Wants to lose weight: No. Sleeping concerns: Yes. Feels highly stressed: Yes., 2018  Sexual  Sexually active: Yes. Number of current partners 2. Sexual orientation: Lesbian, bhatti or homosexual. Gender Identity Identifies as male. No, 2019  Spiritual/Cultural  Jewish, No, 2019  Substance Use - High Risk, 2015  Past, Marijuana, Methamphetamines, 2020  Past, Methamphetamines, 2020  Tobacco - High Risk, 2015  Former smoker, quit more than 30 days ago, No, 2021  Family History  COPD (chronic obstructive pulmonary disease).: Mother.  : Father.  Diabetes mellitus type 2: Mother.  Immunizations  Vaccine Date Status Comments   influenza virus vaccine, inactivated 2021 Given    COVID-19 MRNA, LNP-S, PF- Pfizer 2021 Recorded    COVID-19 MRNA, LNP-S, PF- Pfizer 2021 Recorded    influenza virus vaccine, inactivated 2020 Recorded    human papillomavirus vaccine 2019 Given    influenza virus vaccine, inactivated 2019 Given    human papillomavirus vaccine 2019 Given    human papillomavirus vaccine 2019 Given     meningococcal conjugate vaccine 04/08/2019 Given    meningococcal conjugate vaccine 11/15/2018 Given    influenza virus vaccine, inactivated 11/15/2018 Given    pneumococcal 23-polyvalent vaccine 02/27/2018 Given    influenza virus vaccine, inactivated 10/30/2017 Given    influenza virus vaccine, inactivated - Not Given Patient Refuses     influenza virus vaccine, inactivated 10/20/2016 Given    influenza virus vaccine, inactivated - Not Given Patient Refuses     influenza virus vaccine, inactivated 12/23/2015 Given    influenza virus vaccine, inactivated - Not Given Patient Refuses     pneumococcal 13-valent conjugate vaccine 10/13/2014 Recorded    influenza virus vaccine, inactivated 10/13/2014 Recorded    tetanus toxoid 07/21/2014 Recorded    influenza virus vaccine, inactivated 12/01/2013 Recorded    pneumococcal vacc 03/01/2013 Recorded    hepatitis A adult vaccine 08/01/2012 Recorded    hepatitis B vaccine 04/01/2011 Recorded    influenza virus vaccine, inactivated 10/13/2010 Recorded    pneumococcal 7-valent vaccine 02/25/2008 Recorded    Health Maintenance  Health Maintenance  ???Pending?(in the next year)  ??? ??OverDue  ??? ? ? ?Alcohol Misuse Screening due??01/02/21??and every 1??year(s)  ??? ??Due?  ??? ? ? ?Medicare Annual Wellness Exam due??11/18/21??and every 1??year(s)  ??? ??Due In Future?  ??? ? ? ?Obesity Screening not due until??01/01/22??and every 1??year(s)  ??? ? ? ?Smoking Cessation not due until??01/01/22??and every 1??year(s)  ???Satisfied?(in the past 1 year)  ??? ??Satisfied?  ??? ? ? ?ADL Screening on??11/18/21.??Satisfied by Minh Knight  ??? ? ? ?Blood Pressure Screening on??11/18/21.??Satisfied by Minh Knight  ??? ? ? ?Body Mass Index Check on??11/18/21.??Satisfied by Minh Knight  ??? ? ? ?Depression Screening on??11/18/21.??Satisfied by Minh Knight  ??? ? ? ?Hypertension Management-BMP on??11/18/21.??Satisfied by Jeannette Licona  ??? ? ? ?Influenza Vaccine on??11/18/21.??Satisfied by  Minh Knight  ??? ? ? ?Lipid Screening on??11/18/21.??Satisfied by Jeannette Licona  ??? ? ? ?Obesity Screening on??11/18/21.??Satisfied by Minh Knight  ??? ? ? ?Smoking Cessation on??02/18/21.??Satisfied by Minh Knight  ?  anal pap 8/27/15 NILM, 10/16 HGSIL, CCP 12/2016, 2/21/17, 6/1/17, 10/17, 1/18  anal ct/gc 8/27/15 ?neg, 10/16 neg, 2/27/18, 6/18 neg, 6/20 neg  oral ct/gc 12/23/15 neg, 10/16 neg, 6/5/17 neg, 6/18 neg, 6/20 neg  urine ct/gc 10/14 neg, 10/16 neg, 6/5/17 neg, 11/18 neg, 11/19 neg, 6/20 neg  ophth 12/15, 1/2017 , 8/18 , 10/19 (Dr. Louis office)  Lab Results  Test Name Test Result Date/Time Comments   Creatinine 0.93 mg/dL 01/15/2021 14:17 CST    eGFR-MARISSA 95 mL/min/1.73 m2 01/15/2021 14:17 CST    AST 20 unit/L 01/15/2021 14:17 CST    ALT 22 unit/L 01/15/2021 14:17 CST    WBC 8.4 x10(3)/mcL 01/15/2021 14:17 CST    Hgb 10.6 gm/dL (Low) 01/15/2021 14:17 CST    Hct 36.1 % (Low) 01/15/2021 14:17 CST    Platelet 280 x10(3)/mcL 01/15/2021 14:17 CST    CD4 Pct 35.8 % 01/15/2021 14:17 CST    CD4 Absolute 812 unit/L 01/15/2021 14:17 CST    Syphilis Ab Nonreactive 01/15/2021 14:17 CST    HIV1 RNA PCR-LC <20 cpy/mL 01/15/2021 14:17 CST HIV-1 RNA not detected  ?  The reportable range for this assay is 20 to 10,000,000  copies HIV-1 RNA/mL.

## 2022-05-31 ENCOUNTER — HOSPITAL ENCOUNTER (EMERGENCY)
Facility: HOSPITAL | Age: 42
Discharge: HOME | End: 2022-05-31
Attending: STUDENT IN AN ORGANIZED HEALTH CARE EDUCATION/TRAINING PROGRAM
Payer: COMMERCIAL

## 2022-05-31 VITALS
OXYGEN SATURATION: 99 % | RESPIRATION RATE: 20 BRPM | DIASTOLIC BLOOD PRESSURE: 126 MMHG | BODY MASS INDEX: 19.71 KG/M2 | SYSTOLIC BLOOD PRESSURE: 184 MMHG | HEART RATE: 80 BPM | HEIGHT: 72 IN | TEMPERATURE: 98 F | WEIGHT: 145.5 LBS

## 2022-05-31 DIAGNOSIS — L03.90 CELLULITIS, UNSPECIFIED CELLULITIS SITE: ICD-10-CM

## 2022-05-31 DIAGNOSIS — R21 SCROTAL RASH: Primary | ICD-10-CM

## 2022-05-31 DIAGNOSIS — R53.1 WEAKNESS: ICD-10-CM

## 2022-05-31 DIAGNOSIS — R52 PAIN: ICD-10-CM

## 2022-05-31 LAB
ALBUMIN SERPL-MCNC: 4.1 GM/DL (ref 3.5–5)
ALBUMIN/GLOB SERPL: 1 RATIO (ref 1.1–2)
ALP SERPL-CCNC: 104 UNIT/L (ref 40–150)
ALT SERPL-CCNC: 27 UNIT/L (ref 0–55)
APPEARANCE UR: CLEAR
AST SERPL-CCNC: 18 UNIT/L (ref 5–34)
BACTERIA #/AREA URNS AUTO: ABNORMAL /HPF
BASOPHILS # BLD AUTO: 0.05 X10(3)/MCL (ref 0–0.2)
BASOPHILS NFR BLD AUTO: 0.7 %
BILIRUB UR QL STRIP.AUTO: NEGATIVE MG/DL
BILIRUBIN DIRECT+TOT PNL SERPL-MCNC: 0.5 MG/DL
BUN SERPL-MCNC: 9.9 MG/DL (ref 8.9–20.6)
C TRACH DNA SPEC QL NAA+PROBE: NOT DETECTED
CALCIUM SERPL-MCNC: 10.1 MG/DL (ref 8.4–10.2)
CHLORIDE SERPL-SCNC: 103 MMOL/L (ref 98–107)
CO2 SERPL-SCNC: 24 MMOL/L (ref 22–29)
COLOR UR AUTO: ABNORMAL
CREAT SERPL-MCNC: 0.86 MG/DL (ref 0.73–1.18)
EOSINOPHIL # BLD AUTO: 0.16 X10(3)/MCL (ref 0–0.9)
EOSINOPHIL NFR BLD AUTO: 2.1 %
ERYTHROCYTE [DISTWIDTH] IN BLOOD BY AUTOMATED COUNT: 17.7 % (ref 11.5–17)
GLOBULIN SER-MCNC: 4.2 GM/DL (ref 2.4–3.5)
GLUCOSE SERPL-MCNC: 112 MG/DL (ref 74–100)
GLUCOSE UR QL STRIP.AUTO: NORMAL MG/DL
HCT VFR BLD AUTO: 35.5 % (ref 42–52)
HGB BLD-MCNC: 10.8 GM/DL (ref 14–18)
HYALINE CASTS #/AREA URNS LPF: ABNORMAL /LPF
IMM GRANULOCYTES # BLD AUTO: 0.02 X10(3)/MCL (ref 0–0.02)
IMM GRANULOCYTES NFR BLD AUTO: 0.3 % (ref 0–0.43)
KETONES UR QL STRIP.AUTO: ABNORMAL MG/DL
LEUKOCYTE ESTERASE UR QL STRIP.AUTO: NEGATIVE UNIT/L
LIPASE SERPL-CCNC: 18 U/L
LYMPHOCYTES # BLD AUTO: 1.76 X10(3)/MCL (ref 0.6–4.6)
LYMPHOCYTES NFR BLD AUTO: 23.2 %
MCH RBC QN AUTO: 21.3 PG (ref 27–31)
MCHC RBC AUTO-ENTMCNC: 30.4 MG/DL (ref 33–36)
MCV RBC AUTO: 70.2 FL (ref 80–94)
MONOCYTES # BLD AUTO: 0.36 X10(3)/MCL (ref 0.1–1.3)
MONOCYTES NFR BLD AUTO: 4.8 %
MUCOUS THREADS URNS QL MICRO: ABNORMAL /LPF
N GONORRHOEA DNA SPEC QL NAA+PROBE: NOT DETECTED
NEUTROPHILS # BLD AUTO: 5.2 X10(3)/MCL (ref 2.1–9.2)
NEUTROPHILS NFR BLD AUTO: 68.9 %
NITRITE UR QL STRIP.AUTO: NEGATIVE
NRBC BLD AUTO-RTO: 0 %
PH UR STRIP.AUTO: 8 [PH]
PLATELET # BLD AUTO: 350 X10(3)/MCL (ref 130–400)
PMV BLD AUTO: 9.7 FL (ref 9.4–12.4)
POTASSIUM SERPL-SCNC: 3.7 MMOL/L (ref 3.5–5.1)
PROT SERPL-MCNC: 8.3 GM/DL (ref 6.4–8.3)
PROT UR QL STRIP.AUTO: ABNORMAL MG/DL
RBC # BLD AUTO: 5.06 X10(6)/MCL (ref 4.7–6.1)
RBC #/AREA URNS AUTO: ABNORMAL /HPF
RBC UR QL AUTO: NEGATIVE UNIT/L
SODIUM SERPL-SCNC: 138 MMOL/L (ref 136–145)
SP GR UR STRIP.AUTO: >1.05
SQUAMOUS #/AREA URNS LPF: ABNORMAL /HPF
UROBILINOGEN UR STRIP-ACNC: NORMAL MG/DL
WBC # SPEC AUTO: 7.6 X10(3)/MCL (ref 4.5–11.5)
WBC #/AREA URNS AUTO: ABNORMAL /HPF

## 2022-05-31 PROCEDURE — 36415 COLL VENOUS BLD VENIPUNCTURE: CPT | Performed by: STUDENT IN AN ORGANIZED HEALTH CARE EDUCATION/TRAINING PROGRAM

## 2022-05-31 PROCEDURE — 83690 ASSAY OF LIPASE: CPT | Performed by: STUDENT IN AN ORGANIZED HEALTH CARE EDUCATION/TRAINING PROGRAM

## 2022-05-31 PROCEDURE — 96375 TX/PRO/DX INJ NEW DRUG ADDON: CPT

## 2022-05-31 PROCEDURE — 81001 URINALYSIS AUTO W/SCOPE: CPT | Performed by: STUDENT IN AN ORGANIZED HEALTH CARE EDUCATION/TRAINING PROGRAM

## 2022-05-31 PROCEDURE — 96374 THER/PROPH/DIAG INJ IV PUSH: CPT | Mod: 59

## 2022-05-31 PROCEDURE — 85025 COMPLETE CBC W/AUTO DIFF WBC: CPT | Performed by: STUDENT IN AN ORGANIZED HEALTH CARE EDUCATION/TRAINING PROGRAM

## 2022-05-31 PROCEDURE — 93005 ELECTROCARDIOGRAM TRACING: CPT

## 2022-05-31 PROCEDURE — 87591 N.GONORRHOEAE DNA AMP PROB: CPT | Performed by: STUDENT IN AN ORGANIZED HEALTH CARE EDUCATION/TRAINING PROGRAM

## 2022-05-31 PROCEDURE — 25000003 PHARM REV CODE 250: Performed by: STUDENT IN AN ORGANIZED HEALTH CARE EDUCATION/TRAINING PROGRAM

## 2022-05-31 PROCEDURE — 63600175 PHARM REV CODE 636 W HCPCS: Performed by: STUDENT IN AN ORGANIZED HEALTH CARE EDUCATION/TRAINING PROGRAM

## 2022-05-31 PROCEDURE — 87491 CHLMYD TRACH DNA AMP PROBE: CPT | Performed by: STUDENT IN AN ORGANIZED HEALTH CARE EDUCATION/TRAINING PROGRAM

## 2022-05-31 PROCEDURE — 99285 EMERGENCY DEPT VISIT HI MDM: CPT | Mod: 25

## 2022-05-31 PROCEDURE — 80053 COMPREHEN METABOLIC PANEL: CPT | Performed by: STUDENT IN AN ORGANIZED HEALTH CARE EDUCATION/TRAINING PROGRAM

## 2022-05-31 PROCEDURE — 96361 HYDRATE IV INFUSION ADD-ON: CPT

## 2022-05-31 PROCEDURE — 25500020 PHARM REV CODE 255: Performed by: STUDENT IN AN ORGANIZED HEALTH CARE EDUCATION/TRAINING PROGRAM

## 2022-05-31 RX ORDER — ONDANSETRON 2 MG/ML
4 INJECTION INTRAMUSCULAR; INTRAVENOUS ONCE
Status: COMPLETED | OUTPATIENT
Start: 2022-05-31 | End: 2022-05-31

## 2022-05-31 RX ORDER — CLINDAMYCIN HYDROCHLORIDE 150 MG/1
300 CAPSULE ORAL 4 TIMES DAILY
Qty: 56 CAPSULE | Refills: 0 | Status: SHIPPED | OUTPATIENT
Start: 2022-05-31 | End: 2022-06-07

## 2022-05-31 RX ORDER — KETOROLAC TROMETHAMINE 30 MG/ML
15 INJECTION, SOLUTION INTRAMUSCULAR; INTRAVENOUS
Status: COMPLETED | OUTPATIENT
Start: 2022-05-31 | End: 2022-05-31

## 2022-05-31 RX ORDER — SERTRALINE HYDROCHLORIDE 100 MG/1
1.5 TABLET, FILM COATED ORAL DAILY
COMMUNITY
Start: 2022-01-03 | End: 2022-06-28 | Stop reason: ALTCHOICE

## 2022-05-31 RX ORDER — TRAZODONE HYDROCHLORIDE 50 MG/1
50 TABLET ORAL NIGHTLY
COMMUNITY
Start: 2022-01-03

## 2022-05-31 RX ORDER — HYDROCODONE BITARTRATE AND ACETAMINOPHEN 7.5; 325 MG/1; MG/1
1 TABLET ORAL EVERY 6 HOURS PRN
Qty: 6 TABLET | Refills: 0 | Status: SHIPPED | OUTPATIENT
Start: 2022-05-31 | End: 2022-06-28 | Stop reason: ALTCHOICE

## 2022-05-31 RX ORDER — EMTRICITABINE AND TENOFOVIR ALAFENAMIDE 200; 25 MG/1; MG/1
TABLET ORAL
COMMUNITY
Start: 2022-02-15 | End: 2022-06-14 | Stop reason: SDUPTHER

## 2022-05-31 RX ORDER — ONDANSETRON HYDROCHLORIDE 8 MG/1
8 TABLET, FILM COATED ORAL EVERY 8 HOURS PRN
COMMUNITY
Start: 2022-04-06 | End: 2022-08-02

## 2022-05-31 RX ORDER — DORAVIRINE 100 MG/1
100 TABLET, FILM COATED ORAL
COMMUNITY
Start: 2022-02-15 | End: 2022-06-14 | Stop reason: SDUPTHER

## 2022-05-31 RX ORDER — HYDROMORPHONE HYDROCHLORIDE 1 MG/ML
1 INJECTION, SOLUTION INTRAMUSCULAR; INTRAVENOUS; SUBCUTANEOUS
COMMUNITY
End: 2022-08-02

## 2022-05-31 RX ORDER — MORPHINE SULFATE 2 MG/ML
4 INJECTION, SOLUTION INTRAMUSCULAR; INTRAVENOUS
Status: COMPLETED | OUTPATIENT
Start: 2022-05-31 | End: 2022-05-31

## 2022-05-31 RX ADMIN — SODIUM CHLORIDE 1000 ML: 9 INJECTION, SOLUTION INTRAVENOUS at 09:05

## 2022-05-31 RX ADMIN — KETOROLAC TROMETHAMINE 15 MG: 30 INJECTION, SOLUTION INTRAMUSCULAR; INTRAVENOUS at 05:05

## 2022-05-31 RX ADMIN — IOPAMIDOL 100 ML: 755 INJECTION, SOLUTION INTRAVENOUS at 08:05

## 2022-05-31 RX ADMIN — MORPHINE SULFATE 4 MG: 2 INJECTION, SOLUTION INTRAMUSCULAR; INTRAVENOUS at 07:05

## 2022-05-31 RX ADMIN — ONDANSETRON 4 MG: 2 INJECTION INTRAMUSCULAR; INTRAVENOUS at 05:05

## 2022-06-01 NOTE — ED PROVIDER NOTES
Encounter Date: 5/31/2022       History     Chief Complaint   Patient presents with    Testicle Pain     PT W CO TESTICULAR PAIN SINCE THIS AM W NAUSEA.  DENIES INJURY.      41-year-old male presents to ED for scrotal discomfort.  states over the last day or two his symptoms developed.  denies any lesions, no drainage, no trauma.  additionally reports some discomfort just inferior to his suprapubic region. denies any testicular discomfort, only scrotal.  Denies any hematuria or dysuria.  No changes in his bowel movement.  states he has had multiple gastric surgeries and has a implanted gastric pacemaker.  denies any nausea or vomiting.  No fevers.  denies any swelling or any unilateral symptoms.  No rash skin changes.  No other complaints at this time.        Review of patient's allergies indicates:   Allergen Reactions    Lorazepam Other (See Comments) and Anxiety     Twitching, involuntary movements.   Other reaction(s): violent twitching, increased anxiety  Loose time, twitching, and he doesn't remember anything.       Past Medical History:   Diagnosis Date    Gastroparesis     HIV (human immunodeficiency virus infection)      No past surgical history on file.  No family history on file.  Social History     Tobacco Use    Smoking status: Light Tobacco Smoker     Types: Cigarettes    Smokeless tobacco: Never Used   Substance Use Topics    Alcohol use: No    Drug use: Yes     Types: Marijuana     Comment: 4 DAYS AGO     Review of Systems   Constitutional: Negative for chills and fever.   HENT: Negative for congestion, rhinorrhea and sore throat.    Eyes: Negative for pain, discharge and itching.   Respiratory: Negative for chest tightness and shortness of breath.    Cardiovascular: Negative for chest pain and palpitations.   Gastrointestinal: Positive for abdominal pain. Negative for abdominal distention, anal bleeding, blood in stool, constipation, diarrhea, nausea, rectal pain and vomiting.    Genitourinary: Negative for decreased urine volume, difficulty urinating, dysuria, flank pain, frequency, genital sores, hematuria, penile discharge, penile pain, penile swelling, scrotal swelling, testicular pain and urgency.        Scrotal discomfort   Musculoskeletal: Negative for myalgias and neck pain.   Skin: Negative for color change and rash.   Neurological: Negative for dizziness, weakness and headaches.   Psychiatric/Behavioral: Negative for confusion. The patient is not hyperactive.        Physical Exam     Initial Vitals [05/31/22 1619]   BP Pulse Resp Temp SpO2   (!) 183/105 66 (!) 22 97.9 °F (36.6 °C) 100 %      MAP       --         Physical Exam    Constitutional: He appears well-developed and well-nourished. He is not diaphoretic. No distress.   HENT:   Head: Normocephalic and atraumatic.   Eyes: Conjunctivae and EOM are normal. Pupils are equal, round, and reactive to light.   Neck: Neck supple. No tracheal deviation present.   Normal range of motion.  Cardiovascular: Normal rate, regular rhythm and normal heart sounds.   Pulmonary/Chest: Breath sounds normal. No respiratory distress.   Abdominal: Abdomen is soft and flat. Bowel sounds are normal. He exhibits no distension. There is abdominal tenderness in the suprapubic area.   Prior abdominal surgeries. Gastric pacemaker.    No right CVA tenderness.  No left CVA tenderness. There is no rebound, no guarding, no tenderness at McBurney's point and negative Garcia's sign. negative Rovsing's sign  Musculoskeletal:         General: No tenderness. Normal range of motion.      Cervical back: Normal range of motion and neck supple.     Neurological: He is alert and oriented to person, place, and time. He has normal strength. GCS score is 15. GCS eye subscore is 4. GCS verbal subscore is 5. GCS motor subscore is 6.   Skin: Skin is warm and dry. Capillary refill takes less than 2 seconds. No rash noted.   Psychiatric: He has a normal mood and affect. His  behavior is normal. Judgment and thought content normal.         ED Course   Procedures  Labs Reviewed   URINALYSIS, REFLEX TO URINE CULTURE - Abnormal; Notable for the following components:       Result Value    Color, UA Light-Yellow (*)     Protein, UA Trace (*)     Ketones, UA 1+ (*)     Mucous, UA Trace (*)     All other components within normal limits   COMPREHENSIVE METABOLIC PANEL - Abnormal; Notable for the following components:    Glucose Level 112 (*)     Globulin 4.2 (*)     Albumin/Globulin Ratio 1.0 (*)     All other components within normal limits   CBC WITH DIFFERENTIAL - Abnormal; Notable for the following components:    Hgb 10.8 (*)     Hct 35.5 (*)     MCV 70.2 (*)     MCH 21.3 (*)     MCHC 30.4 (*)     RDW 17.7 (*)     IG# 0.02 (*)     All other components within normal limits   CHLAMYDIA/GONORRHOEAE(GC), PCR - Normal   LIPASE - Normal   CBC W/ AUTO DIFFERENTIAL    Narrative:     The following orders were created for panel order CBC W/ AUTO DIFFERENTIAL.  Procedure                               Abnormality         Status                     ---------                               -----------         ------                     CBC with Differential[[id number]]        Abnormal            Final result                 Please view results for these tests on the individual orders.   EXTRA TUBES    Narrative:     The following orders were created for panel order EXTRA TUBES.  Procedure                               Abnormality         Status                     ---------                               -----------         ------                     Light Blue Top Hold[[id number]]                              In process                 Red Top Hold[[id number]]                                     In process                   Please view results for these tests on the individual orders.   LIGHT BLUE TOP HOLD   RED TOP HOLD     EKG Readings: (Independently Interpreted)   Initial Reading: No STEMI. Rhythm: Normal  Sinus Rhythm. Ectopy: No Ectopy. Conduction: Normal. Axis: Normal. Clinical Impression: Normal Sinus Rhythm     ECG Results          EKG 12-lead (Chest Pain) Age >30 (Final result)  Result time 06/05/22 22:52:01    Final result by Brie, Lab In [facility] (06/05/22 22:52:01)                 Narrative:    Test Reason : R07.9,    Vent. Rate : 067 BPM     Atrial Rate : 067 BPM     P-R Int : 128 ms          QRS Dur : 138 ms      QT Int : 416 ms       P-R-T Axes : 054 080 070 degrees     QTc Int : 439 ms    Normal sinus rhythm with sinus arrhythmia  Right bundle branch block  Abnormal ECG  No previous ECGs available  Confirmed by Alanna Olson MD ([id number]) on 6/5/2022 10:51:55 PM    Referred By: PHYSICIAN ER           Confirmed By:Alanna Olson MD                            Imaging Results          CT Abdomen Pelvis With Contrast (Final result)  Result time 05/31/22 21:10:20    Final result by Manohar Orona MD (05/31/22 21:10:20)                 Impression:      No acute process of the abdomen or pelvis with other secondary findings as above.      Electronically signed by: Manohar Orona MD  Date:    05/31/2022  Time:    21:10             Narrative:    EXAMINATION:  CT ABDOMEN PELVIS WITH CONTRAST    CLINICAL HISTORY:  suprapubic swelling/pain, groin pain, acute on chronic;    TECHNIQUE:  Multiple cross-sectional images of the abdomen and pelvis were obtained after the intravenous administration of contrast.  Coronal and sagittal reformatted images were obtained.  An automated dose exposure technique was utilized.  This limits radiation does the patient.    COMPARISON:  06/28/2017    FINDINGS:  Dependent atelectatic changes lungs.  Heart size within normal limits.    The liver, spleen, adrenals, kidneys, and pancreas are normal.  Gallbladder is present.    Small bowel is of normal caliber with a gastric body stimulator identified along the greater curvature.  Colon is also normal caliber with scattered colonic diverticula.   No adjacent inflammatory changes.  Normal air-filled appendix.    The bladder and prostate are normal.  No free fluid in the abdomen pelvis.  Course and caliber of the abdominal aorta is normal.  No evidence for adenopathy.    No suspicious osseous lesions.  The soft tissues are normal.  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                               US Scrotum And Testicles (Final result)  Result time 05/31/22 18:37:52    Final result by Gabriel Valencia MD (05/31/22 18:37:52)                 Impression:      1. Negative for testicular torsion and solid mass.  2. Scrotal wall thickening, question cellulitis.      Electronically signed by: Gabriel Valencia  Date:    05/31/2022  Time:    18:37             Narrative:    EXAMINATION:  US SCROTUM AND TESTICLES    CLINICAL HISTORY:  Pain, unspecified    COMPARISON:  16 May 2017    FINDINGS:  Real-time exam with grayscale, color, and spectral imaging of the scrotum was performed.    The right testicle measures 4.3 x 2.8 x 1.6 cm and the left measures 4.0 x 2.7 x 1.8 cm. Color-flow and arterial waveforms are present within both testicles. There are no findings of torsion.  There is no solid testicular mass.  There is a small left hydrocele.    There is scrotal wall thickening.  No drainable fluid collection.                                 Medications   ketorolac injection 15 mg (15 mg Intravenous Given 5/31/22 1750)   ondansetron injection 4 mg (4 mg Intravenous Given 5/31/22 1750)   morphine injection 4 mg (4 mg Intravenous Given 5/31/22 1956)   iopamidoL (ISOVUE-370) injection 100 mL (100 mLs Intravenous Given 5/31/22 2022)   sodium chloride 0.9% bolus 1,000 mL (0 mLs Intravenous Stopped 5/31/22 2238)     Medical Decision Making:   Clinical Tests:   Lab Tests: Reviewed and Ordered  Radiological Study: Reviewed and Ordered  Medical Tests: Reviewed and Ordered  ED Management:  Pt reports scrotal and lower abdominal pain. Gradual onset. Non-testicular. Pain well controlled in  "department. Exam unremarkable besides gastric pacemaker & inferior suprapubic tenderness w/o rebound guarding rigidity. Also mild redness to scrotal. testicles non tender. No evidence of forneirs gangrene. UA, GC, labs, WBC all negative. CT abd no acute and US testicles benign besides possible scrotal cellulitis. Given abx, has very close OP f/u. Pt is at this time stable for d/c. Of note nursing staff did order an EKG at one point when pt got nauseas, showed evidence of "pacemaker failure" at no point did pt have CP. Attempted to analyze pacemaker only to realize he only had a gastric stimulator and that was what was being perceived as an cardiac pacemaker. Efforts then were halted to analyze the device since there was no cardiac instability. Pt also has stimulator f/u and will keep that. (tacho)                      Clinical Impression:   Final diagnoses:  [R52] Pain  [R53.1] Weakness  [R21] Scrotal rash (Primary)  [L03.90] Cellulitis, unspecified cellulitis site          ED Disposition Condition    Discharge Stable        ED Prescriptions     Medication Sig Dispense Start Date End Date Auth. Provider    clindamycin (CLEOCIN) 150 MG capsule () Take 2 capsules (300 mg total) by mouth 4 (four) times daily. for 7 days 56 capsule 2022 Gus Soriano MD    HYDROcodone-acetaminophen (NORCO) 7.5-325 mg per tablet Take 1 tablet by mouth every 6 (six) hours as needed for Pain. 6 tablet 2022  Gus Soriano MD        Follow-up Information     Follow up With Specialties Details Why Contact Info [facility] Emergency Medicine  As needed, If symptoms worsen [address] [facility] [address] [phone]    PCP  Schedule an appointment as soon as possible for a visit in 3 days             Gus Soriano MD  22 1727    "

## 2022-06-01 NOTE — ED NOTES
Pt is complaining of pain , pain is 10/10, will notify the physician, order given for morphine 4 MG IV

## 2022-06-03 ENCOUNTER — TELEPHONE (OUTPATIENT)
Dept: INFECTIOUS DISEASES | Facility: CLINIC | Age: 42
End: 2022-06-03
Payer: COMMERCIAL

## 2022-06-03 NOTE — TELEPHONE ENCOUNTER
----- Message from Sugar Munroe sent at 6/3/2022  2:31 PM CDT -----  Regarding: FW: Pt called    ----- Message -----  From: Sugar Munroe  Sent: 6/3/2022  12:15 PM CDT  To: , #  Subject: Pt called                                        #MAGALY PT#      Pt wants to know if you can tell him if he had a tetnaus & TB?    [phone]

## 2022-06-03 NOTE — TELEPHONE ENCOUNTER
I called and spoke with pt who needs a copy or vaccine record for school. I informed pt he can come by to  a copy. Pt verbalizes understanding

## 2022-06-10 DIAGNOSIS — E55.9 VITAMIN D DEFICIENCY: Primary | ICD-10-CM

## 2022-06-10 RX ORDER — ERGOCALCIFEROL 1.25 MG/1
50000 CAPSULE ORAL
Qty: 1 CAPSULE | Refills: 1 | Status: SHIPPED | OUTPATIENT
Start: 2022-06-10 | End: 2022-06-28 | Stop reason: SDUPTHER

## 2022-06-10 RX ORDER — ERGOCALCIFEROL 1.25 MG/1
1 CAPSULE ORAL
COMMUNITY
Start: 2022-02-15 | End: 2022-06-10 | Stop reason: SDUPTHER

## 2022-06-10 NOTE — TELEPHONE ENCOUNTER
Fax received from [facility] pharmacy for refill on Vitamin D. Refill request sent to Mary Ann.    LV 02/15/2022  Last Labs 02/16/2022  NV 06/28/2022

## 2022-06-14 DIAGNOSIS — B20 HIV DISEASE: Primary | ICD-10-CM

## 2022-06-14 RX ORDER — EMTRICITABINE AND TENOFOVIR ALAFENAMIDE 200; 25 MG/1; MG/1
1 TABLET ORAL DAILY
Qty: 30 TABLET | Refills: 0 | Status: SHIPPED | OUTPATIENT
Start: 2022-06-14 | End: 2022-06-28 | Stop reason: SDUPTHER

## 2022-06-14 RX ORDER — DORAVIRINE 100 MG/1
100 TABLET, FILM COATED ORAL DAILY
Qty: 30 TABLET | Refills: 0 | Status: SHIPPED | OUTPATIENT
Start: 2022-06-14 | End: 2022-06-28 | Stop reason: SDUPTHER

## 2022-06-28 ENCOUNTER — OFFICE VISIT (OUTPATIENT)
Dept: INFECTIOUS DISEASES | Facility: CLINIC | Age: 42
End: 2022-06-28
Payer: COMMERCIAL

## 2022-06-28 VITALS
SYSTOLIC BLOOD PRESSURE: 106 MMHG | WEIGHT: 145.81 LBS | TEMPERATURE: 98 F | DIASTOLIC BLOOD PRESSURE: 73 MMHG | BODY MASS INDEX: 19.75 KG/M2 | HEART RATE: 87 BPM | HEIGHT: 72 IN | RESPIRATION RATE: 20 BRPM

## 2022-06-28 DIAGNOSIS — B18.2 CHRONIC HEPATITIS C WITHOUT HEPATIC COMA: ICD-10-CM

## 2022-06-28 DIAGNOSIS — B20 HIV DISEASE: ICD-10-CM

## 2022-06-28 DIAGNOSIS — K62.82 ANAL DYSPLASIA: ICD-10-CM

## 2022-06-28 DIAGNOSIS — K31.84 GASTROPARESIS: ICD-10-CM

## 2022-06-28 DIAGNOSIS — B20 HIV DISEASE: Primary | ICD-10-CM

## 2022-06-28 DIAGNOSIS — E55.9 VITAMIN D DEFICIENCY: ICD-10-CM

## 2022-06-28 LAB
ALBUMIN SERPL-MCNC: 3.4 GM/DL (ref 3.5–5)
ALBUMIN/GLOB SERPL: 0.9 RATIO (ref 1.1–2)
ALP SERPL-CCNC: 89 UNIT/L (ref 40–150)
ALT SERPL-CCNC: 19 UNIT/L (ref 0–55)
AST SERPL-CCNC: 14 UNIT/L (ref 5–34)
BASOPHILS # BLD AUTO: 0.06 X10(3)/MCL (ref 0–0.2)
BASOPHILS NFR BLD AUTO: 0.4 %
BILIRUBIN DIRECT+TOT PNL SERPL-MCNC: 0.4 MG/DL
BUN SERPL-MCNC: 7.6 MG/DL (ref 8.9–20.6)
CALCIUM SERPL-MCNC: 9.6 MG/DL (ref 8.4–10.2)
CHLORIDE SERPL-SCNC: 101 MMOL/L (ref 98–107)
CO2 SERPL-SCNC: 27 MMOL/L (ref 22–29)
CREAT SERPL-MCNC: 0.93 MG/DL (ref 0.73–1.18)
EOSINOPHIL # BLD AUTO: 0.14 X10(3)/MCL (ref 0–0.9)
EOSINOPHIL NFR BLD AUTO: 1 %
ERYTHROCYTE [DISTWIDTH] IN BLOOD BY AUTOMATED COUNT: 18.6 % (ref 11.5–17)
FERRITIN SERPL-MCNC: 8.28 NG/ML (ref 21.81–274.66)
GLOBULIN SER-MCNC: 3.8 GM/DL (ref 2.4–3.5)
GLUCOSE SERPL-MCNC: 89 MG/DL (ref 74–100)
HCT VFR BLD AUTO: 33.6 % (ref 42–52)
HGB BLD-MCNC: 9.9 GM/DL (ref 14–18)
IMM GRANULOCYTES # BLD AUTO: 0.04 X10(3)/MCL (ref 0–0.02)
IMM GRANULOCYTES NFR BLD AUTO: 0.3 % (ref 0–0.43)
LYMPHOCYTES # BLD AUTO: 2.57 X10(3)/MCL (ref 0.6–4.6)
LYMPHOCYTES NFR BLD AUTO: 19.1 %
MCH RBC QN AUTO: 21.7 PG (ref 27–31)
MCHC RBC AUTO-ENTMCNC: 29.5 MG/DL (ref 33–36)
MCV RBC AUTO: 73.5 FL (ref 80–94)
MONOCYTES # BLD AUTO: 0.79 X10(3)/MCL (ref 0.1–1.3)
MONOCYTES NFR BLD AUTO: 5.9 %
NEUTROPHILS # BLD AUTO: 9.9 X10(3)/MCL (ref 2.1–9.2)
NEUTROPHILS NFR BLD AUTO: 73.3 %
NRBC BLD AUTO-RTO: 0 %
PLATELET # BLD AUTO: 347 X10(3)/MCL (ref 130–400)
PMV BLD AUTO: 10.6 FL (ref 9.4–12.4)
POTASSIUM SERPL-SCNC: 3.8 MMOL/L (ref 3.5–5.1)
PROT SERPL-MCNC: 7.2 GM/DL (ref 6.4–8.3)
RBC # BLD AUTO: 4.57 X10(6)/MCL (ref 4.7–6.1)
SODIUM SERPL-SCNC: 137 MMOL/L (ref 136–145)
T PALLIDUM AB SER QL: NONREACTIVE
WBC # SPEC AUTO: 13.5 X10(3)/MCL (ref 4.5–11.5)

## 2022-06-28 PROCEDURE — 99215 OFFICE O/P EST HI 40 MIN: CPT | Mod: PBBFAC | Performed by: NURSE PRACTITIONER

## 2022-06-28 PROCEDURE — 1159F MED LIST DOCD IN RCRD: CPT | Mod: CPTII,,, | Performed by: NURSE PRACTITIONER

## 2022-06-28 PROCEDURE — 1159F PR MEDICATION LIST DOCUMENTED IN MEDICAL RECORD: ICD-10-PCS | Mod: CPTII,,, | Performed by: NURSE PRACTITIONER

## 2022-06-28 PROCEDURE — 1160F PR REVIEW ALL MEDS BY PRESCRIBER/CLIN PHARMACIST DOCUMENTED: ICD-10-PCS | Mod: CPTII,,, | Performed by: NURSE PRACTITIONER

## 2022-06-28 PROCEDURE — 82728 ASSAY OF FERRITIN: CPT

## 2022-06-28 PROCEDURE — 1160F RVW MEDS BY RX/DR IN RCRD: CPT | Mod: CPTII,,, | Performed by: NURSE PRACTITIONER

## 2022-06-28 PROCEDURE — 99215 OFFICE O/P EST HI 40 MIN: CPT | Mod: S$PBB,,, | Performed by: NURSE PRACTITIONER

## 2022-06-28 PROCEDURE — 87522 HEPATITIS C REVRS TRNSCRPJ: CPT

## 2022-06-28 PROCEDURE — 80053 COMPREHEN METABOLIC PANEL: CPT | Performed by: NURSE PRACTITIONER

## 2022-06-28 PROCEDURE — 3008F BODY MASS INDEX DOCD: CPT | Mod: CPTII,,, | Performed by: NURSE PRACTITIONER

## 2022-06-28 PROCEDURE — 3074F SYST BP LT 130 MM HG: CPT | Mod: CPTII,,, | Performed by: NURSE PRACTITIONER

## 2022-06-28 PROCEDURE — 36415 COLL VENOUS BLD VENIPUNCTURE: CPT | Performed by: NURSE PRACTITIONER

## 2022-06-28 PROCEDURE — 3008F PR BODY MASS INDEX (BMI) DOCUMENTED: ICD-10-PCS | Mod: CPTII,,, | Performed by: NURSE PRACTITIONER

## 2022-06-28 PROCEDURE — 3078F PR MOST RECENT DIASTOLIC BLOOD PRESSURE < 80 MM HG: ICD-10-PCS | Mod: CPTII,,, | Performed by: NURSE PRACTITIONER

## 2022-06-28 PROCEDURE — 3078F DIAST BP <80 MM HG: CPT | Mod: CPTII,,, | Performed by: NURSE PRACTITIONER

## 2022-06-28 PROCEDURE — 99215 PR OFFICE/OUTPT VISIT, EST, LEVL V, 40-54 MIN: ICD-10-PCS | Mod: S$PBB,,, | Performed by: NURSE PRACTITIONER

## 2022-06-28 PROCEDURE — 81596 NFCT DS CHRNC HCV 6 ASSAYS: CPT

## 2022-06-28 PROCEDURE — 3074F PR MOST RECENT SYSTOLIC BLOOD PRESSURE < 130 MM HG: ICD-10-PCS | Mod: CPTII,,, | Performed by: NURSE PRACTITIONER

## 2022-06-28 RX ORDER — METOCLOPRAMIDE 10 MG/1
10 TABLET ORAL
COMMUNITY
End: 2022-06-28

## 2022-06-28 RX ORDER — DORAVIRINE 100 MG/1
100 TABLET, FILM COATED ORAL DAILY
Qty: 30 TABLET | Refills: 4 | Status: SHIPPED | OUTPATIENT
Start: 2022-06-28 | End: 2022-10-25 | Stop reason: SDUPTHER

## 2022-06-28 RX ORDER — ERGOCALCIFEROL 1.25 MG/1
50000 CAPSULE ORAL
Qty: 5 CAPSULE | Refills: 4 | Status: SHIPPED | OUTPATIENT
Start: 2022-06-28 | End: 2023-01-05 | Stop reason: SDUPTHER

## 2022-06-28 RX ORDER — EMTRICITABINE AND TENOFOVIR ALAFENAMIDE 200; 25 MG/1; MG/1
1 TABLET ORAL DAILY
Qty: 30 TABLET | Refills: 4 | Status: SHIPPED | OUTPATIENT
Start: 2022-06-28 | End: 2022-10-25 | Stop reason: SDUPTHER

## 2022-06-28 RX ORDER — PROMETHAZINE HYDROCHLORIDE 6.25 MG/5ML
5 SYRUP ORAL EVERY 4 HOURS PRN
COMMUNITY
Start: 2022-06-01

## 2022-06-28 RX ORDER — ONDANSETRON 8 MG/1
8 TABLET, ORALLY DISINTEGRATING ORAL EVERY 6 HOURS PRN
COMMUNITY
End: 2022-09-27 | Stop reason: ALTCHOICE

## 2022-06-28 RX ORDER — MUPIROCIN 20 MG/G
OINTMENT TOPICAL
COMMUNITY
End: 2022-09-27 | Stop reason: ALTCHOICE

## 2022-06-28 NOTE — PROGRESS NOTES
Subjective:       Patient ID: Claude Hall is a 42 y.o. male.    Chief Complaint: b20 f/u    6/28/22  RAUL is a 43 yo WM presenting today for HIV f/u visit.  He reports 100% adherent to Descovy & Pifeltro, tolerating well.  Last labs 2/22 HIV VL UD, CD4 969.  HCV VL 3.75 mil, new infection. Will check labs today & prepare for treatment asap.  Gastric Pacer replaced, working much better now & was found to have a displaced wire.  He was seen in ED about 3 weeks ago with pressure in the pelvic region, treated with clindamycin & is feeling much better now.  Remains in relationship with Manas & has not had any new partners in quite some time.  Denies any IVDU.  Transitioning from [employer] to [employer] this fall.  Immunization records form to be completed, will check MMR status.  All questions answered & concerns addressed.    2/15/22  RAUL is a 42 yo HIV + WM evaluated by audio-video telemedicine.  He/She is located at home, and [facility]  We are both located in [place], the [facility] in which I am licensed to practice.  The patient consents to telemedicine visit and is the only individual online.  The patient (or patient's representative) stated that they understood and accepted the privacy and security risks to their information at their location.   He reports 100% adherent to Descovy & Pifeltro, tolerates well with viral suppression.  Last labs 11/21 VL <20, Cd4 629.  He tells me that he ate bad seafood this weekend and was vomiting for 3 days, starting to feel better now.  He attended appointment with Dr. Hayes, underwent HRA & biopsy but states that the experience was traumatic.  He was left with a large bandage & bruising. He tells me that he would prefer to return to Dr. Rucker at [facility] for any additional f/u that will be needed.  November 2021 labs with new Hep C ab +, will add HCV VL to assess for active infection.  He denies any IVDU, has had new sexual partners but states that condoms  are used most of the time.  Partner is under my care as well for PrEP & will ensure that he is checked for HCV too. RAUL tells me that his diet has been better since last labs, will check fasting lipid panel this week.  He is also taking ergocalciferol weekly but also added otc vitamin d3.  Will check vitamin d level.  Gastroparesis well controlled, needs refill on brand name Nexium as generic is not effective for him. All questions answered & concerns addressed.  Face to face time spent with patient exceeds 15 minutes, over 50% of which was used for education and counseling regarding medical conditions, current medications including risk/benefit and side effects/adverse events, over the counter medications-uses/doses, home self-care and contact precautions, and red flags and indications for immediate medical attention.  The patient is receptive, expresses understanding and is agreeable to plan. All questions answered.    11/18/21  RAUL is a 42 yo WM presenting today for HIV f/u visit.  He tells me that he has been off Descovy & Pifeltro X 6 days now due to insurance issues.  Last labs 1/15/21 VL <20, Cd4 812.  Will update labs today.  He is amenable to flu vax today.  Gastroparesis is controlled & he is feeling well.  Will be graduating from [employer] this semester & continuing education moving forward.  Follow-up for anal dysplasia has been interrupted due to COVID pandemic, appreciates referral to Dr. ALBIN Hayes's office for closer f/u.  Will send referral.  He remains in monogamous relationship with Angus & declines need for repeat STI screenings at this juncture.  All questions answered & concerns addressed.      Review of Systems   Constitutional: Negative.    HENT: Negative.    Respiratory: Negative.    Cardiovascular: Negative.    Gastrointestinal: Negative.    Genitourinary: Negative.    Integumentary:  Negative.   Neurological: Negative.    Hematological: Negative.    Psychiatric/Behavioral: Negative.           Objective:      Physical Exam  Vitals reviewed.   Constitutional:       General: He is not in acute distress.     Appearance: Normal appearance. He is not toxic-appearing.   HENT:      Mouth/Throat:      Mouth: Mucous membranes are moist.      Pharynx: Oropharynx is clear.   Eyes:      Conjunctiva/sclera: Conjunctivae normal.   Cardiovascular:      Rate and Rhythm: Normal rate and regular rhythm.   Pulmonary:      Effort: Pulmonary effort is normal. No respiratory distress.      Breath sounds: Normal breath sounds.   Abdominal:      General: Abdomen is flat. Bowel sounds are normal.      Palpations: Abdomen is soft.   Musculoskeletal:         General: Normal range of motion.      Cervical back: Normal range of motion.   Lymphadenopathy:      Cervical: No cervical adenopathy.   Skin:     General: Skin is warm and dry.   Neurological:      General: No focal deficit present.      Mental Status: He is alert and oriented to person, place, and time. Mental status is at baseline.   Psychiatric:         Mood and Affect: Mood normal.         Behavior: Behavior normal.         Assessment:       Problem List Items Addressed This Visit    None     Visit Diagnoses     HIV disease    -  Primary    Relevant Medications    doravirine (PIFELTRO) 100 mg Tab    emtricitabine-tenofovir alafen (DESCOVY) 200-25 mg Tab    Other Relevant Orders    MMRV Immune Status Profile    CD4 Lymphocytes    CBC Auto Differential (Completed)    Comprehensive Metabolic Panel (Completed)    SYPHILIS ANTIBODY (WITH REFLEX RPR) (Completed)    HIV-1 RNA, Quantitative, PCR with Reflex to Genotype    C.trach/N.gonor AMP RNA, (Non-Genital)    Chronic hepatitis C without hepatic coma        Relevant Orders    Hepatitis C RNA, Quantitative, PCR    Hepatitis C Genotype    MAGGI IgG by IFA    Ferritin (Completed)    Fibrotest-Actitest, Serum    Protime-INR (Completed)    US Abdomen Limited    Anal dysplasia        Gastroparesis        Vitamin D deficiency         Relevant Medications    ergocalciferol (ERGOCALCIFEROL) 50,000 unit Cap          Plan:       HIV disease  -     MMRV Immune Status Profile; Future; Expected date: 06/28/2022  -     CD4 Lymphocytes; Future; Expected date: 06/28/2022  -     CBC Auto Differential; Future; Expected date: 06/28/2022  -     Comprehensive Metabolic Panel  -     SYPHILIS ANTIBODY (WITH REFLEX RPR); Future; Expected date: 06/28/2022  -     HIV-1 RNA, Quantitative, PCR with Reflex to Genotype; Future; Expected date: 06/28/2022  -     C.trach/N.gonor AMP RNA, (Non-Genital); Future; Expected date: 06/28/2022  -     doravirine (PIFELTRO) 100 mg Tab; Take 1 tablet (100 mg total) by mouth Daily.  Dispense: 30 tablet; Refill: 4  -     emtricitabine-tenofovir alafen (DESCOVY) 200-25 mg Tab; Take 1 tablet by mouth Daily.  Dispense: 30 tablet; Refill: 4  Adherence and sexual health counseling done.  Use condoms for all sexual encounters.  Blood precautions.   Continue Descovy & Pifeltro as directed.  Labs today.  RTC 1 month with Mary Ann.    HIV Wellness:  Anal pap: HRA with Biopsy 1/5/22  Oral CT/GC: 6/20 Neg  Anal CT/GC: 6/20 Neg  Urine CT/GC: 11/21 Neg  RPR: 2/22 Neg  Ophth:     Chronic hepatitis C without hepatic coma  -     Hepatitis C RNA, Quantitative, PCR; Future; Expected date: 06/28/2022  -     Hepatitis C Genotype; Future; Expected date: 06/28/2022  -     MAGGI IgG by IFA; Future; Expected date: 06/28/2022  -     Ferritin; Future; Expected date: 06/28/2022  -     Fibrotest-Actitest, Serum; Future; Expected date: 06/28/2022  -     Protime-INR; Future; Expected date: 06/28/2022  -     US Abdomen Limited; Future; Expected date: 07/28/2022  Dx 11/21  HCV VL 3.75 mil 2/22  Pre-treatment labs today  Blood & sex precautions  RUQ abd u/s within 1 month  Not a candidate for FibroScan s/t Gastric Pacer  RTC 1 month with Mary Ann for treatment plan    Anal dysplasia    HRA with biopsy 1/22  Will f/u with [facility] in 6-12  months    Gastroparesis    Controlled.  Keep f/u with GI as scheduled    Vitamin D deficiency  -     ergocalciferol (ERGOCALCIFEROL) 50,000 unit Cap; Take 1 capsule (50,000 Units total) by mouth every 7 days.  Dispense: 5 capsule; Refill: 4    Continue Ergocalciferol weekly as prescribed.

## 2022-06-29 LAB
ANA SER QL HEP2 SUBST: NORMAL
HCV GENTYP SERPL NAA+PROBE: ABNORMAL
HIV1 RNA # PLAS NAA DL=20: NORMAL COPIES/ML

## 2022-06-30 ENCOUNTER — TELEPHONE (OUTPATIENT)
Dept: INFECTIOUS DISEASES | Facility: CLINIC | Age: 42
End: 2022-06-30
Payer: MEDICARE

## 2022-06-30 LAB
AGE: 44
C TRACH RRNA SPEC QL NAA+PROBE: NEGATIVE
CD3+CD4+ CELLS # BLD: 19 % (ref 28–48)
CD3+CD4+ CELLS # SPEC: 610.47 UNIT/L (ref 589–1505)
CD3+CD4+ CELLS NFR BLD: 23.8 %
LYMPHOCYTES # BLD AUTO: 2565 X10(3)/MCL (ref 1260–5520)
LYMPHOMA - T-CELL MARKERS SPEC-IMP: ABNORMAL
N GONORRHOEA RRNA SPEC QL NAA+PROBE: NEGATIVE
SPECIMEN SOURCE: NORMAL
SPECIMEN SOURCE: NORMAL
WBC # BLD AUTO: ABNORMAL /MM3 (ref 4500–11500)

## 2022-06-30 NOTE — TELEPHONE ENCOUNTER
The only lab I collected to assess for immunity was the MMR, which was not collected.  He will need treatment for HCV, which we will be initiating at next visit.  Thank you.

## 2022-06-30 NOTE — TELEPHONE ENCOUNTER
Mary Ann pt called re lab results and wants to see if he is in need of any other vaccines.    I also spoke with pt re MMR not collected. Pt stated he will have redrawn at next visit on 8/9/2022

## 2022-07-01 LAB
A2 MACROGLOB SERPL-MCNC: 159 MG/DL (ref 100–280)
ALT SERPL W P-5'-P-CCNC: 23 U/L (ref 7–55)
ANNOTATION COMMENT IMP: ABNORMAL
APO A-I SERPL-MCNC: 124 MG/DL
BILIRUB SERPL-MCNC: 0.3 MG/DL
FIBROSIS STAGE SERPL QL: ABNORMAL
GGT SERPL-CCNC: 12 U/L (ref 8–61)
HAPTOGLOB SERPL NEPH-MCNC: 317 MG/DL (ref 30–200)
HCV RNA SERPL NAA+PROBE-ACNC: ABNORMAL IU/ML
LIVER FIBR SCORE SERPL CALC.FIBROSURE: 0.05
LIVER FIBROSIS INTERPRETATION SER-IMP: ABNORMAL
NECROINFLAMMATORY ACT GRADE SERPL QL: ABNORMAL
NECROINFLAMMATORY ACT SCORE SERPL: 0.07
NECROINFLAMMATORY ACTIV INTERP SER-IMP: ABNORMAL
SERIAL #: ABNORMAL

## 2022-07-01 NOTE — TELEPHONE ENCOUNTER
I called and discussed and informed pt the only lab I collected to assess for immunity was the MMR, which was not collected.  He will need treatment for HCV, which we will be initiating at next visit.  Pt verbalizes understanding and will discuss further at next visit

## 2022-07-26 ENCOUNTER — HOSPITAL ENCOUNTER (OUTPATIENT)
Dept: RADIOLOGY | Facility: HOSPITAL | Age: 42
Discharge: HOME | End: 2022-07-26
Attending: NURSE PRACTITIONER
Payer: MEDICARE

## 2022-07-26 DIAGNOSIS — B18.2 CHRONIC HEPATITIS C WITHOUT HEPATIC COMA: ICD-10-CM

## 2022-07-26 PROCEDURE — 76705 ECHO EXAM OF ABDOMEN: CPT | Mod: TC

## 2022-08-02 ENCOUNTER — OFFICE VISIT (OUTPATIENT)
Dept: INFECTIOUS DISEASES | Facility: CLINIC | Age: 42
End: 2022-08-02
Payer: COMMERCIAL

## 2022-08-02 VITALS
HEIGHT: 72 IN | WEIGHT: 151.38 LBS | TEMPERATURE: 98 F | DIASTOLIC BLOOD PRESSURE: 75 MMHG | HEART RATE: 89 BPM | RESPIRATION RATE: 16 BRPM | BODY MASS INDEX: 20.5 KG/M2 | SYSTOLIC BLOOD PRESSURE: 118 MMHG

## 2022-08-02 DIAGNOSIS — B18.2 CHRONIC HEPATITIS C WITHOUT HEPATIC COMA: ICD-10-CM

## 2022-08-02 DIAGNOSIS — K22.5 ZENKER'S DIVERTICULUM: ICD-10-CM

## 2022-08-02 DIAGNOSIS — K31.84 GASTROPARESIS: ICD-10-CM

## 2022-08-02 DIAGNOSIS — D50.9 IRON DEFICIENCY ANEMIA, UNSPECIFIED IRON DEFICIENCY ANEMIA TYPE: ICD-10-CM

## 2022-08-02 DIAGNOSIS — R13.14 PHARYNGOESOPHAGEAL DYSPHAGIA: ICD-10-CM

## 2022-08-02 DIAGNOSIS — B20 HIV DISEASE: Primary | ICD-10-CM

## 2022-08-02 PROBLEM — Z72.0 TOBACCO USER: Status: ACTIVE | Noted: 2022-08-02

## 2022-08-02 PROCEDURE — 3078F PR MOST RECENT DIASTOLIC BLOOD PRESSURE < 80 MM HG: ICD-10-PCS | Mod: CPTII,,, | Performed by: NURSE PRACTITIONER

## 2022-08-02 PROCEDURE — 1159F MED LIST DOCD IN RCRD: CPT | Mod: CPTII,,, | Performed by: NURSE PRACTITIONER

## 2022-08-02 PROCEDURE — 1160F RVW MEDS BY RX/DR IN RCRD: CPT | Mod: CPTII,,, | Performed by: NURSE PRACTITIONER

## 2022-08-02 PROCEDURE — 3074F PR MOST RECENT SYSTOLIC BLOOD PRESSURE < 130 MM HG: ICD-10-PCS | Mod: CPTII,,, | Performed by: NURSE PRACTITIONER

## 2022-08-02 PROCEDURE — 99214 OFFICE O/P EST MOD 30 MIN: CPT | Mod: PBBFAC | Performed by: NURSE PRACTITIONER

## 2022-08-02 PROCEDURE — 1160F PR REVIEW ALL MEDS BY PRESCRIBER/CLIN PHARMACIST DOCUMENTED: ICD-10-PCS | Mod: CPTII,,, | Performed by: NURSE PRACTITIONER

## 2022-08-02 PROCEDURE — 3078F DIAST BP <80 MM HG: CPT | Mod: CPTII,,, | Performed by: NURSE PRACTITIONER

## 2022-08-02 PROCEDURE — 99215 OFFICE O/P EST HI 40 MIN: CPT | Mod: S$PBB,,, | Performed by: NURSE PRACTITIONER

## 2022-08-02 PROCEDURE — 99215 PR OFFICE/OUTPT VISIT, EST, LEVL V, 40-54 MIN: ICD-10-PCS | Mod: S$PBB,,, | Performed by: NURSE PRACTITIONER

## 2022-08-02 PROCEDURE — 3008F PR BODY MASS INDEX (BMI) DOCUMENTED: ICD-10-PCS | Mod: CPTII,,, | Performed by: NURSE PRACTITIONER

## 2022-08-02 PROCEDURE — 3074F SYST BP LT 130 MM HG: CPT | Mod: CPTII,,, | Performed by: NURSE PRACTITIONER

## 2022-08-02 PROCEDURE — 1159F PR MEDICATION LIST DOCUMENTED IN MEDICAL RECORD: ICD-10-PCS | Mod: CPTII,,, | Performed by: NURSE PRACTITIONER

## 2022-08-02 PROCEDURE — 3008F BODY MASS INDEX DOCD: CPT | Mod: CPTII,,, | Performed by: NURSE PRACTITIONER

## 2022-08-02 RX ORDER — FERROUS SULFATE 325(65) MG
325 TABLET, DELAYED RELEASE (ENTERIC COATED) ORAL DAILY
Qty: 90 TABLET | Refills: 1 | Status: SHIPPED | OUTPATIENT
Start: 2022-08-02

## 2022-08-02 RX ORDER — ESOMEPRAZOLE MAGNESIUM 40 MG/1
40 GRANULE, DELAYED RELEASE ORAL
COMMUNITY
End: 2022-08-22 | Stop reason: SDUPTHER

## 2022-08-02 NOTE — PROGRESS NOTES
Subjective:       Patient ID: Claude Hall is a 42 y.o. male.    Chief Complaint: b20 f/u    8/2/22  RAUL is a 43 yo WM presenting today for HIV/HCV f/u visit.  He is HIV virally suppressed on Descovy & Pifeltro, last labs 6/28/22 VL UD, CD4 610.  Labs collected for HCV as well.  Baseline VL [id number], GT 1a, Fibrosure A0, F0.  He is not a candidate for FibroScan due to gastric pacer implanted for gastroparesis.  He is HCV treatment naive, recently diagnosed within the year. Additional STI screenings negative 6/28/22 oral ct/gc, anal ct/gc, urine ct/gc, RPR non-reactive.  RUQ abdominal u/s completed 7/26/22 NL.  He is not a candidate for treatment with preferred treatment (Epclusa) due to GI treatment with Nexium and cannot be revised.  His GERD with esophagitis and gastroparesis are finally well controlled after years of battling with recurrent & persistent nausea, vomiting, and abdominal pain.  Will treat with Mavyret 3 tabs daily x 8 weeks. No significant DDIs with Mavyret & currently prescribed medications per [email or web address] interaction check.  He agrees to adhere to prescribed treatment plan & appreciates opportunity for telemedicine visits.  All questions answered & concerns addressed.     6/28/22  RAUL is a 43 yo WM presenting today for HIV f/u visit.  He reports 100% adherent to Descovy & Pifeltro, tolerating well.  Last labs 2/22 HIV VL UD, CD4 969.  HCV VL 3.75 mil, new infection. Will check labs today & prepare for treatment asap.  Gastric Pacer replaced, working much better now & was found to have a displaced wire.  He was seen in ED about 3 weeks ago with pressure in the pelvic region, treated with clindamycin & is feeling much better now.  Remains in relationship with Manas & has not had any new partners in quite some time.  Denies any IVDU.  Transitioning from [facility] to [facility] this fall.  Immunization records form to be completed, will check MMR status.  All questions answered & concerns  addressed.     2/15/22  RAUL is a 40 yo HIV + WM evaluated by audio-video telemedicine.  He/She is located at home, and I, the provider, am located at [facility].  We are both located in [place], the [facility] in which I am licensed to practice.  The patient consents to telemedicine visit and is the only individual online.  The patient (or patient's representative) stated that they understood and accepted the privacy and security risks to their information at their location.   He reports 100% adherent to Descovy & Pifeltro, tolerates well with viral suppression.  Last labs 11/21 VL <20, Cd4 629.  He tells me that he ate bad seafood this weekend and was vomiting for 3 days, starting to feel better now.  He attended appointment with Dr. Hayes, underwent HRA & biopsy but states that the experience was traumatic.  He was left with a large bandage & bruising. He tells me that he would prefer to return to Dr. Rucker at [facility] for any additional f/u that will be needed.  November 2021 labs with new Hep C ab +, will add HCV VL to assess for active infection.  He denies any IVDU, has had new sexual partners but states that condoms are used most of the time.  Partner is under my care as well for PrEP & will ensure that he is checked for HCV too. RAUL tells me that his diet has been better since last labs, will check fasting lipid panel this week.  He is also taking ergocalciferol weekly but also added otc vitamin d3.  Will check vitamin d level.  Gastroparesis well controlled, needs refill on brand name Nexium as generic is not effective for him. All questions answered & concerns addressed.    Review of Systems   Constitutional: Negative.    HENT: Negative.    Respiratory: Negative.    Cardiovascular: Negative.    Gastrointestinal: Negative.    Genitourinary: Negative.    Integumentary:  Negative.   Neurological: Negative.    Hematological: Negative.    Psychiatric/Behavioral: Negative.          Objective:      Physical  Exam  Vitals reviewed.   Constitutional:       General: He is not in acute distress.     Appearance: Normal appearance. He is not toxic-appearing.   Eyes:      General: No scleral icterus.  Cardiovascular:      Rate and Rhythm: Normal rate and regular rhythm.      Heart sounds: Normal heart sounds.   Pulmonary:      Effort: Pulmonary effort is normal. No respiratory distress.      Breath sounds: Normal breath sounds.   Abdominal:      General: Bowel sounds are normal. There is no distension.      Palpations: Abdomen is soft. There is no mass.      Tenderness: There is no abdominal tenderness.   Musculoskeletal:         General: Normal range of motion.   Skin:     General: Skin is warm and dry.   Neurological:      Mental Status: He is alert and oriented to person, place, and time.         Assessment:       Problem List Items Addressed This Visit        GI    Gastroparesis    Pharyngoesophageal dysphagia    Zenker's diverticulum      Other Visit Diagnoses     HIV disease    -  Primary    Chronic hepatitis C without hepatic coma        Relevant Medications    glecaprevir-pibrentasvir (MAVYRET) 100-40 mg Tab    Iron deficiency anemia, unspecified iron deficiency anemia type        Relevant Medications    ferrous sulfate 325 (65 FE) MG EC tablet          Plan:       HIV disease    Adherence and sexual health counseling done.  Use condoms for all sexual encounters.  Blood precautions.   Continue Descovy & Pifeltro as directed.  RTC ~6 weeks with Mary Ann via telemed.     HIV Wellness:  Anal pap: HRA with Biopsy 1/5/22  Oral CT/GC: 6/22 Neg  Anal CT/GC: 6/22 Neg  Urine CT/GC: 6/22 Neg  RPR: 6/22 Neg  Ophth:     Chronic hepatitis C without hepatic coma  -     glecaprevir-pibrentasvir (MAVYRET) 100-40 mg Tab; Take 3 tablets by mouth once daily.  Dispense: 84 tablet; Refill: 1  Treatment naive.  GT 1a, baseline VL [id number]  Fibrosure 6/28/22 A 0, F 0  FibroScan: not a candidate due to implanted gastric pacer  Not a candidate for  Epclusa due to DDI with Nexium which is medically necessary   Mavyret 3 tabs daily x 8 weeks   Refer to HCV  to initiate PA & treatment protocol  Blood & sex precautions: do not share a razor, needle, toothbrush, or clippers with anyone.  Partner tested HCV negative.    Gastroparesis  Controlled  Keep f/u with GI & surgeon as planned    Pharyngoesophageal dysphagia  Keep f/u with GI    Zenker's diverticulum  Keep f/u with GI    Iron deficiency anemia, unspecified iron deficiency anemia type  -     ferrous sulfate 325 (65 FE) MG EC tablet; Take 1 tablet (325 mg total) by mouth once daily.  Dispense: 90 tablet; Refill: 1

## 2022-08-09 ENCOUNTER — TELEPHONE (OUTPATIENT)
Dept: INFECTIOUS DISEASES | Facility: CLINIC | Age: 42
End: 2022-08-09
Payer: MEDICARE

## 2022-08-09 DIAGNOSIS — B19.20 HEPATITIS C VIRUS INFECTION WITHOUT HEPATIC COMA, UNSPECIFIED CHRONICITY: Primary | ICD-10-CM

## 2022-08-10 ENCOUNTER — CLINICAL SUPPORT (OUTPATIENT)
Dept: INFECTIOUS DISEASES | Facility: CLINIC | Age: 42
End: 2022-08-10
Payer: COMMERCIAL

## 2022-08-10 DIAGNOSIS — B19.20 HEPATITIS C VIRUS INFECTION WITHOUT HEPATIC COMA, UNSPECIFIED CHRONICITY: Primary | ICD-10-CM

## 2022-08-10 PROCEDURE — 99211 OFF/OP EST MAY X REQ PHY/QHP: CPT | Mod: PBBFAC

## 2022-08-10 NOTE — PROGRESS NOTES
Pt came to clinic today for Mavyret teaching.  Pt was given Mavyret, by me.  Explained Hep  C summary form, Mavyret one tablet daily, make sure to have next month's Mavyret available before running out, increase fluids to help with possible side effects of headaches and fatique.  Okay to take Ibuprofen for headaches, no alcohol intake, and before starting any new scripts, call our clinic or the pharmacy.  Informed pt that labs are due every 4 weeks while on treatment.  Hep C summary form, lab orders/dates given to patient.  Patient verbalized understanding of all the above information.

## 2022-08-22 RX ORDER — ESOMEPRAZOLE MAGNESIUM 40 MG/1
40 GRANULE, DELAYED RELEASE ORAL
Qty: 60 EACH | Refills: 2 | Status: SHIPPED | OUTPATIENT
Start: 2022-08-22 | End: 2023-03-20 | Stop reason: RX

## 2022-09-07 ENCOUNTER — TELEPHONE (OUTPATIENT)
Dept: INFECTIOUS DISEASES | Facility: CLINIC | Age: 42
End: 2022-09-07
Payer: COMMERCIAL

## 2022-09-07 ENCOUNTER — LAB VISIT (OUTPATIENT)
Dept: LAB | Facility: HOSPITAL | Age: 42
End: 2022-09-07
Attending: NURSE PRACTITIONER
Payer: COMMERCIAL

## 2022-09-07 DIAGNOSIS — B19.20 HEPATITIS C VIRUS INFECTION WITHOUT HEPATIC COMA, UNSPECIFIED CHRONICITY: ICD-10-CM

## 2022-09-07 DIAGNOSIS — B20 HIV DISEASE: ICD-10-CM

## 2022-09-07 LAB
ALBUMIN SERPL-MCNC: 4 GM/DL (ref 3.5–5)
ALBUMIN/GLOB SERPL: 1.2 RATIO (ref 1.1–2)
ALP SERPL-CCNC: 88 UNIT/L (ref 40–150)
ALT SERPL-CCNC: 12 UNIT/L (ref 0–55)
ANISOCYTOSIS BLD QL SMEAR: ABNORMAL
AST SERPL-CCNC: 15 UNIT/L (ref 5–34)
BASOPHILS # BLD AUTO: 0.08 X10(3)/MCL (ref 0–0.2)
BASOPHILS NFR BLD AUTO: 0.9 %
BILIRUBIN DIRECT+TOT PNL SERPL-MCNC: 0.4 MG/DL
BUN SERPL-MCNC: 10.9 MG/DL (ref 8.9–20.6)
CALCIUM SERPL-MCNC: 9.8 MG/DL (ref 8.4–10.2)
CHLORIDE SERPL-SCNC: 104 MMOL/L (ref 98–107)
CO2 SERPL-SCNC: 24 MMOL/L (ref 22–29)
CREAT SERPL-MCNC: 1.1 MG/DL (ref 0.73–1.18)
EOSINOPHIL # BLD AUTO: 0.35 X10(3)/MCL (ref 0–0.9)
EOSINOPHIL NFR BLD AUTO: 3.8 %
ERYTHROCYTE [DISTWIDTH] IN BLOOD BY AUTOMATED COUNT: 22.8 % (ref 11.5–17)
GFR SERPLBLD CREATININE-BSD FMLA CKD-EPI: >60 MLS/MIN/1.73/M2
GLOBULIN SER-MCNC: 3.4 GM/DL (ref 2.4–3.5)
GLUCOSE SERPL-MCNC: 149 MG/DL (ref 74–100)
HCT VFR BLD AUTO: 38.9 % (ref 42–52)
HGB BLD-MCNC: 11.3 GM/DL (ref 14–18)
HYPOCHROMIA BLD QL SMEAR: ABNORMAL
IMM GRANULOCYTES # BLD AUTO: 0.02 X10(3)/MCL (ref 0–0.04)
IMM GRANULOCYTES NFR BLD AUTO: 0.2 %
LYMPHOCYTES # BLD AUTO: 3.31 X10(3)/MCL (ref 0.6–4.6)
LYMPHOCYTES NFR BLD AUTO: 36.1 %
MCH RBC QN AUTO: 23.3 PG (ref 27–31)
MCHC RBC AUTO-ENTMCNC: 29 MG/DL (ref 33–36)
MCV RBC AUTO: 80 FL (ref 80–94)
MICROCYTES BLD QL SMEAR: ABNORMAL
MONOCYTES # BLD AUTO: 0.5 X10(3)/MCL (ref 0.1–1.3)
MONOCYTES NFR BLD AUTO: 5.4 %
NEUTROPHILS # BLD AUTO: 4.9 X10(3)/MCL (ref 2.1–9.2)
NEUTROPHILS NFR BLD AUTO: 53.6 %
NRBC BLD AUTO-RTO: 0 %
PLATELET # BLD AUTO: 302 X10(3)/MCL (ref 130–400)
PLATELET # BLD EST: NORMAL 10*3/UL
PMV BLD AUTO: 9.9 FL (ref 7.4–10.4)
POIKILOCYTOSIS BLD QL SMEAR: ABNORMAL
POTASSIUM SERPL-SCNC: 3.9 MMOL/L (ref 3.5–5.1)
PROT SERPL-MCNC: 7.4 GM/DL (ref 6.4–8.3)
RBC # BLD AUTO: 4.86 X10(6)/MCL (ref 4.7–6.1)
RBC MORPH BLD: ABNORMAL
SODIUM SERPL-SCNC: 139 MMOL/L (ref 136–145)
WBC # SPEC AUTO: 9.2 X10(3)/MCL (ref 4.5–11.5)

## 2022-09-07 PROCEDURE — 86762 RUBELLA ANTIBODY: CPT

## 2022-09-07 PROCEDURE — 87522 HEPATITIS C REVRS TRNSCRPJ: CPT

## 2022-09-07 PROCEDURE — 80053 COMPREHEN METABOLIC PANEL: CPT

## 2022-09-07 PROCEDURE — 86735 MUMPS ANTIBODY: CPT

## 2022-09-07 PROCEDURE — 85025 COMPLETE CBC W/AUTO DIFF WBC: CPT

## 2022-09-07 PROCEDURE — 36415 COLL VENOUS BLD VENIPUNCTURE: CPT

## 2022-09-07 NOTE — TELEPHONE ENCOUNTER
----- Message from Sugar Munroe sent at 9/7/2022 11:56 AM CDT -----  Regarding: Pt called  MERCEDES      Pt called regarding hep c meds [phone];  He will be available after 2:15

## 2022-09-07 NOTE — TELEPHONE ENCOUNTER
Pt came by [facility] to  Mavyret and they were out, along with [facility].  [facility] will deliver it to his home tomorrow.

## 2022-09-07 NOTE — TELEPHONE ENCOUNTER
Pt states he is ready to  his refill @ [facility].  Call placed to Sangeetha with pharmacy to let her know he is ready for refill of Mavyret.

## 2022-09-09 LAB
HCV RNA SERPL NAA+PROBE-ACNC: NORMAL IU/ML
MEV IGG SER IA-ACNC: 0.5
MEV IGG SER QL IA: NEGATIVE
MUV IGG SER IA-ACNC: 0.6
MUV IGG SER QL IA: NEGATIVE
RUBV IGG SERPL IA-ACNC: 0.3
RUBV IGG SERPL QL IA: NEGATIVE
VZV IGG SER IA-ACNC: 2.4
VZV IGG SER QL IA: POSITIVE

## 2022-09-13 NOTE — PROGRESS NOTES
Results reviewed.  DJ has lost immunity to measles, mumps, & rubella.  He can either submit & receive a booster dose of MMR or sign a vaccination waiver for school.  Please notify him of results.  Thank you.

## 2022-09-14 DIAGNOSIS — D50.9 IRON DEFICIENCY ANEMIA, UNSPECIFIED IRON DEFICIENCY ANEMIA TYPE: ICD-10-CM

## 2022-09-15 RX ORDER — METOCLOPRAMIDE 10 MG/1
10 TABLET ORAL 2 TIMES DAILY PRN
Qty: 60 TABLET | Refills: 2 | Status: SHIPPED | OUTPATIENT
Start: 2022-09-15

## 2022-09-27 ENCOUNTER — OFFICE VISIT (OUTPATIENT)
Dept: INFECTIOUS DISEASES | Facility: CLINIC | Age: 42
End: 2022-09-27
Payer: COMMERCIAL

## 2022-09-27 DIAGNOSIS — Z23 NEED FOR VACCINATION: ICD-10-CM

## 2022-09-27 DIAGNOSIS — B20 HIV DISEASE: ICD-10-CM

## 2022-09-27 DIAGNOSIS — B18.2 CHRONIC HEPATITIS C WITHOUT HEPATIC COMA: Primary | ICD-10-CM

## 2022-09-27 PROCEDURE — 99215 PR OFFICE/OUTPT VISIT, EST, LEVL V, 40-54 MIN: ICD-10-PCS | Mod: 95,,, | Performed by: NURSE PRACTITIONER

## 2022-09-27 PROCEDURE — 1160F PR REVIEW ALL MEDS BY PRESCRIBER/CLIN PHARMACIST DOCUMENTED: ICD-10-PCS | Mod: CPTII,95,, | Performed by: NURSE PRACTITIONER

## 2022-09-27 PROCEDURE — 1160F RVW MEDS BY RX/DR IN RCRD: CPT | Mod: CPTII,95,, | Performed by: NURSE PRACTITIONER

## 2022-09-27 PROCEDURE — 99215 OFFICE O/P EST HI 40 MIN: CPT | Mod: 95,,, | Performed by: NURSE PRACTITIONER

## 2022-09-27 PROCEDURE — 1159F PR MEDICATION LIST DOCUMENTED IN MEDICAL RECORD: ICD-10-PCS | Mod: CPTII,95,, | Performed by: NURSE PRACTITIONER

## 2022-09-27 PROCEDURE — 1159F MED LIST DOCD IN RCRD: CPT | Mod: CPTII,95,, | Performed by: NURSE PRACTITIONER

## 2022-09-27 NOTE — PROGRESS NOTES
Subjective:       Patient ID: Claude Hall is a 42 y.o. male.    Chief Complaint: hcv    9/27/22  RAUL is a 43 yo WM evaluated today via telemedicine for HCV/HIV f/u visit. He started Mavyret 3 tabs daily for 8 weeks on 8/10/22.  He is tolerating it well & denies noting any side effects. He tells me that he did miss one day due to medication availability at time of refill.  Otherwise, 100% adherent.  Labs collected 9/7/22 at week 4, HCV not detected.  He remains on Descovy & Pifeltro daily.  Last HIV labs 6/28/22 HIV UD, CD4 610.  Will repeat with next labs.  Blood & sex precautions strongly encouraged, risk reduction for HCV re-exposure.  Vaccinations discussed.  Will get flu & 1st dose of MMR when he comes for labs 10/6/22.  Will plan to get monkeypox vaccine at [facility] & COVID variant specific booster in the community.  All questions answered & concerns addressed.     Patient and provider are located in the [facility].    Face to Face time with patient:  51 minutes of total time spent on the encounter, which includes face to face time and non-face to face time preparing to see the patient (eg, review of tests), Obtaining and/or reviewing separately obtained history, Documenting clinical information in the electronic or other health record, Independently interpreting results (not separately reported) and communicating results to the patient/family/caregiver, or Care coordination (not separately reported).     Each patient to whom he or she provides medical services by telemedicine is:  (1) informed of the relationship between the physician and patient and the respective role of any other health care provider with respect to management of the patient; and (2) notified that he or she may decline to receive medical services by telemedicine and may withdraw from such care at any time.     8/2/22  RAUL is a 43 yo WM presenting today for HIV/HCV f/u visit.  He is HIV virally suppressed on Descovy &  Pifeltro, last labs 6/28/22 VL UD, CD4 610.  Labs collected for HCV as well.  Baseline VL [id number], GT 1a, Fibrosure A0, F0.  He is not a candidate for FibroScan due to gastric pacer implanted for gastroparesis.  He is HCV treatment naive, recently diagnosed within the year. Additional STI screenings negative 6/28/22 oral ct/gc, anal ct/gc, urine ct/gc, RPR non-reactive.  RUQ abdominal u/s completed 7/26/22 NL.  He is not a candidate for treatment with preferred treatment (Epclusa) due to GI treatment with Nexium and cannot be revised.  His GERD with esophagitis and gastroparesis are finally well controlled after years of battling with recurrent & persistent nausea, vomiting, and abdominal pain.  Will treat with Mavyret 3 tabs daily x 8 weeks. No significant DDIs with Mavyret & currently prescribed medications per [employer] interaction check.  He agrees to adhere to prescribed treatment plan & appreciates opportunity for telemedicine visits.  All questions answered & concerns addressed.      6/28/22  RAUL is a 41 yo WM presenting today for HIV f/u visit.  He reports 100% adherent to Descovy & Pifeltro, tolerating well.  Last labs 2/22 HIV VL UD, CD4 969.  HCV VL 3.75 mil, new infection. Will check labs today & prepare for treatment asap.  Gastric Pacer replaced, working much better now & was found to have a displaced wire.  He was seen in ED about 3 weeks ago with pressure in the pelvic region, treated with clindamycin & is feeling much better now.  Remains in relationship with Manas & has not had any new partners in quite some time.  Denies any IVDU.  Transitioning from [facility] to [facility] this fall.  Immunization records form to be completed, will check MMR status.  All questions answered & concerns addressed.    Review of Systems   Constitutional: Negative.    HENT: Negative.     Respiratory: Negative.     Cardiovascular: Negative.    Gastrointestinal: Negative.    Genitourinary: Negative.    Integumentary:  Negative.    Neurological: Negative.    Hematological: Negative.    Psychiatric/Behavioral: Negative.         Objective:      Physical Exam  Constitutional:       General: He is not in acute distress.     Appearance: Normal appearance.   HENT:      Head: Normocephalic.   Eyes:      Conjunctiva/sclera: Conjunctivae normal.   Pulmonary:      Effort: Pulmonary effort is normal.   Musculoskeletal:         General: Normal range of motion.      Cervical back: Normal range of motion.   Neurological:      General: No focal deficit present.      Mental Status: He is alert and oriented to person, place, and time. Mental status is at baseline.   Psychiatric:         Mood and Affect: Mood normal.         Behavior: Behavior normal.         Thought Content: Thought content normal.         Judgment: Judgment normal.       Assessment:       Problem List Items Addressed This Visit    None      Plan:           Chronic hepatitis C without hepatic coma  -     CBC Auto Differential; Future; Expected date: 10/06/2022  -     Comprehensive Metabolic Panel; Future; Expected date: 10/06/2022  -     Hepatitis C RNA, Quantitative, PCR; Future; Expected date: 10/06/2022  Treatment naive.  GT 1a, baseline VL [id number]  Fibrosure 6/28/22 A 0, F 0  FibroScan: not a candidate due to implanted gastric pacer  Not a candidate for Epclusa due to DDI with Nexium which is medically necessary   Mavyret 3 tabs daily x 8 weeks, started 8/10/22  HCV not detected 9/7/22, week 4  Week 8 labs as scheduled 10/6/22  Blood & sex precautions: do not share a razor, needle, toothbrush, or clippers with anyone.  Partner tested HCV negative.  RTC virtual in 1 month as scheduled.    HIV disease  -     CBC Auto Differential; Future; Expected date: 10/06/2022  -     CD4 Lymphocytes; Future; Expected date: 10/06/2022  -     Comprehensive Metabolic Panel; Future; Expected date: 10/06/2022  -     HIV-1 RNA, Quantitative, PCR with Reflex to Genotype; Future; Expected date: 10/06/2022  -      SYPHILIS ANTIBODY (WITH REFLEX RPR); Future; Expected date: 10/06/2022  Adherence and sexual health counseling done.  Use condoms for all sexual encounters.  Blood precautions.   Continue Descovy & Pifeltro as directed.  Repeat labs 10/6/22.  RTC 1 month with Mary Ann via telemed as scheduled.     HIV Wellness:  Anal pap: HRA with Biopsy 1/5/22  Oral CT/GC: 6/22 Neg  Anal CT/GC: 6/22 Neg  Urine CT/GC: 6/22 Neg  RPR: 6/22 Neg  Ophth:     Need for vaccination  -     Influenza - Quadrivalent (PF); Future; Expected date: 10/06/2022  -     (In Office Administered) MMR / Varicella Combined Vaccine (SQ); Future; Expected date: 10/06/2022     Flu & MMR #1 10/6/22.  MMR #2 next in clinic visit.

## 2022-10-06 ENCOUNTER — CLINICAL SUPPORT (OUTPATIENT)
Dept: INFECTIOUS DISEASES | Facility: CLINIC | Age: 42
End: 2022-10-06
Payer: COMMERCIAL

## 2022-10-06 DIAGNOSIS — B18.2 CHRONIC HEPATITIS C WITHOUT HEPATIC COMA: ICD-10-CM

## 2022-10-06 DIAGNOSIS — Z23 NEED FOR VACCINATION: Primary | ICD-10-CM

## 2022-10-06 DIAGNOSIS — B20 HIV DISEASE: ICD-10-CM

## 2022-10-06 LAB
ALBUMIN SERPL-MCNC: 4 GM/DL (ref 3.5–5)
ALBUMIN/GLOB SERPL: 1.2 RATIO (ref 1.1–2)
ALP SERPL-CCNC: 89 UNIT/L (ref 40–150)
ALT SERPL-CCNC: 14 UNIT/L (ref 0–55)
AST SERPL-CCNC: 19 UNIT/L (ref 5–34)
BASOPHILS # BLD AUTO: 0.04 X10(3)/MCL (ref 0–0.2)
BASOPHILS NFR BLD AUTO: 0.5 %
BILIRUBIN DIRECT+TOT PNL SERPL-MCNC: 0.3 MG/DL
BUN SERPL-MCNC: 14.4 MG/DL (ref 8.9–20.6)
CALCIUM SERPL-MCNC: 9.4 MG/DL (ref 8.4–10.2)
CHLORIDE SERPL-SCNC: 105 MMOL/L (ref 98–107)
CO2 SERPL-SCNC: 25 MMOL/L (ref 22–29)
CREAT SERPL-MCNC: 0.9 MG/DL (ref 0.73–1.18)
EOSINOPHIL # BLD AUTO: 0.25 X10(3)/MCL (ref 0–0.9)
EOSINOPHIL NFR BLD AUTO: 3.4 %
ERYTHROCYTE [DISTWIDTH] IN BLOOD BY AUTOMATED COUNT: 20.9 % (ref 11.5–17)
GFR SERPLBLD CREATININE-BSD FMLA CKD-EPI: >60 MLS/MIN/1.73/M2
GLOBULIN SER-MCNC: 3.4 GM/DL (ref 2.4–3.5)
GLUCOSE SERPL-MCNC: 99 MG/DL (ref 74–100)
HCT VFR BLD AUTO: 38 % (ref 42–52)
HGB BLD-MCNC: 11.5 GM/DL (ref 14–18)
IMM GRANULOCYTES # BLD AUTO: 0.02 X10(3)/MCL (ref 0–0.04)
IMM GRANULOCYTES NFR BLD AUTO: 0.3 %
LYMPHOCYTES # BLD AUTO: 2.35 X10(3)/MCL (ref 0.6–4.6)
LYMPHOCYTES NFR BLD AUTO: 32.2 %
MCH RBC QN AUTO: 23.7 PG (ref 27–31)
MCHC RBC AUTO-ENTMCNC: 30.3 MG/DL (ref 33–36)
MCV RBC AUTO: 78.2 FL (ref 80–94)
MONOCYTES # BLD AUTO: 0.5 X10(3)/MCL (ref 0.1–1.3)
MONOCYTES NFR BLD AUTO: 6.8 %
NEUTROPHILS # BLD AUTO: 4.1 X10(3)/MCL (ref 2.1–9.2)
NEUTROPHILS NFR BLD AUTO: 56.8 %
NRBC BLD AUTO-RTO: 0 %
PLATELET # BLD AUTO: 279 X10(3)/MCL (ref 130–400)
PMV BLD AUTO: 10.2 FL (ref 7.4–10.4)
POTASSIUM SERPL-SCNC: 3.9 MMOL/L (ref 3.5–5.1)
PROT SERPL-MCNC: 7.4 GM/DL (ref 6.4–8.3)
RBC # BLD AUTO: 4.86 X10(6)/MCL (ref 4.7–6.1)
SODIUM SERPL-SCNC: 139 MMOL/L (ref 136–145)
T PALLIDUM AB SER QL: NONREACTIVE
WBC # SPEC AUTO: 7.3 X10(3)/MCL (ref 4.5–11.5)

## 2022-10-06 PROCEDURE — 36415 COLL VENOUS BLD VENIPUNCTURE: CPT

## 2022-10-06 PROCEDURE — 85025 COMPLETE CBC W/AUTO DIFF WBC: CPT

## 2022-10-06 PROCEDURE — 86361 T CELL ABSOLUTE COUNT: CPT

## 2022-10-06 PROCEDURE — 87522 HEPATITIS C REVRS TRNSCRPJ: CPT

## 2022-10-06 PROCEDURE — 80053 COMPREHEN METABOLIC PANEL: CPT

## 2022-10-06 PROCEDURE — 87536 HIV-1 QUANT&REVRSE TRNSCRPJ: CPT

## 2022-10-06 PROCEDURE — 86780 TREPONEMA PALLIDUM: CPT

## 2022-10-07 LAB
AGE: 44
CD3+CD4+ CELLS # BLD: 32.2 % (ref 28–48)
CD3+CD4+ CELLS # SPEC: 818.01 UNIT/L (ref 589–1505)
CD3+CD4+ CELLS NFR BLD: 34.8 %
LYMPHOCYTES # BLD AUTO: 2350.6 X10(3)/MCL (ref 1260–5520)
LYMPHOMA - T-CELL MARKERS SPEC-IMP: NORMAL
WBC # BLD AUTO: 7300 /MM3 (ref 4500–11500)

## 2022-10-08 LAB
HCV RNA SERPL NAA+PROBE-ACNC: NORMAL IU/ML
HIV1 RNA # PLAS NAA DL=20: NORMAL COPIES/ML

## 2022-10-25 ENCOUNTER — OFFICE VISIT (OUTPATIENT)
Dept: INFECTIOUS DISEASES | Facility: CLINIC | Age: 42
End: 2022-10-25
Payer: COMMERCIAL

## 2022-10-25 DIAGNOSIS — B18.2 CHRONIC HEPATITIS C WITHOUT HEPATIC COMA: Primary | ICD-10-CM

## 2022-10-25 DIAGNOSIS — B20 HIV DISEASE: ICD-10-CM

## 2022-10-25 PROCEDURE — 1159F PR MEDICATION LIST DOCUMENTED IN MEDICAL RECORD: ICD-10-PCS | Mod: CPTII,95,, | Performed by: NURSE PRACTITIONER

## 2022-10-25 PROCEDURE — 1159F MED LIST DOCD IN RCRD: CPT | Mod: CPTII,95,, | Performed by: NURSE PRACTITIONER

## 2022-10-25 PROCEDURE — 1160F RVW MEDS BY RX/DR IN RCRD: CPT | Mod: CPTII,95,, | Performed by: NURSE PRACTITIONER

## 2022-10-25 PROCEDURE — 99214 OFFICE O/P EST MOD 30 MIN: CPT | Mod: 95,,, | Performed by: NURSE PRACTITIONER

## 2022-10-25 PROCEDURE — 1160F PR REVIEW ALL MEDS BY PRESCRIBER/CLIN PHARMACIST DOCUMENTED: ICD-10-PCS | Mod: CPTII,95,, | Performed by: NURSE PRACTITIONER

## 2022-10-25 PROCEDURE — 99214 PR OFFICE/OUTPT VISIT, EST, LEVL IV, 30-39 MIN: ICD-10-PCS | Mod: 95,,, | Performed by: NURSE PRACTITIONER

## 2022-10-25 RX ORDER — CLONAZEPAM 1 MG/1
1 TABLET ORAL DAILY
COMMUNITY
Start: 2022-10-17

## 2022-10-25 RX ORDER — EMTRICITABINE AND TENOFOVIR ALAFENAMIDE 200; 25 MG/1; MG/1
1 TABLET ORAL DAILY
Qty: 30 TABLET | Refills: 4 | Status: SHIPPED | OUTPATIENT
Start: 2022-10-25 | End: 2023-02-07 | Stop reason: SDUPTHER

## 2022-10-25 RX ORDER — DORAVIRINE 100 MG/1
100 TABLET, FILM COATED ORAL DAILY
Qty: 30 TABLET | Refills: 4 | Status: SHIPPED | OUTPATIENT
Start: 2022-10-25 | End: 2023-02-07 | Stop reason: SDUPTHER

## 2022-10-25 RX ORDER — DEXTROAMPHETAMINE SACCHARATE, AMPHETAMINE ASPARTATE MONOHYDRATE, DEXTROAMPHETAMINE SULFATE AND AMPHETAMINE SULFATE 5; 5; 5; 5 MG/1; MG/1; MG/1; MG/1
20 CAPSULE, EXTENDED RELEASE ORAL 2 TIMES DAILY
COMMUNITY
End: 2023-12-26

## 2022-10-25 NOTE — PROGRESS NOTES
Subjective:       Patient ID: Claude Hall is a 42 y.o. male.    Chief Complaint: Follow-up (B20 and Hep C)    10/25/22   RAUL is a 43 yo WM presenting today for HIV, HCV f/u visit.  He completed 8 weeks of Mavyret on 10/6/22 as scheduled and tolerated it well.  HCV not detected in blood at end of treatment on 10/6/22.  Will continue to follow for SVR 12.  Re-exposure risk reduction measures reviewed. Voiced understanding.  He continues to take Descovy & Pifeltro daily, tolerates it very well.  On 10/6/22, HIV VL UD, Cd4 818.  He received flu vax on 10/6/22 as ordered, MMR was not readily available.  Will revisit at next return appointment.  He has established care with Dr. Walter at [facility] for PCP & management of mental health medications only. All questions answered & concerns addressed.      Patient and provider are located in the [facility].    Face to Face time with patient:  30 minutes of total time spent on the encounter, which includes face to face time and non-face to face time preparing to see the patient (eg, review of tests), Obtaining and/or reviewing separately obtained history, Documenting clinical information in the electronic or other health record, Independently interpreting results (not separately reported) and communicating results to the patient/family/caregiver, or Care coordination (not separately reported).     Each patient to whom he or she provides medical services by telemedicine is:  (1) informed of the relationship between the physician and patient and the respective role of any other health care provider with respect to management of the patient; and (2) notified that he or she may decline to receive medical services by telemedicine and may withdraw from such care at any time.   9/27/22  RAUL is a 43 yo WM evaluated today via telemedicine for HCV/HIV f/u visit. He started Mavyret 3 tabs daily for 8 weeks on 8/10/22.  He is tolerating it well & denies noting any side  effects. He tells me that he did miss one day due to medication availability at time of refill.  Otherwise, 100% adherent.  Labs collected 9/7/22 at week 4, HCV not detected.  He remains on Descovy & Pifeltro daily.  Last HIV labs 6/28/22 HIV UD, CD4 610.  Will repeat with next labs.  Blood & sex precautions strongly encouraged, risk reduction for HCV re-exposure.  Vaccinations discussed.  Will get flu & 1st dose of MMR when he comes for labs 10/6/22.  Will plan to get monkeypox vaccine at [facility] & COVID variant specific booster in the community.  All questions answered & concerns addressed.      Patient and provider are located in the state [facility].     Face to Face time with patient:  51 minutes of total time spent on the encounter, which includes face to face time and non-face to face time preparing to see the patient (eg, review of tests), Obtaining and/or reviewing separately obtained history, Documenting clinical information in the electronic or other health record, Independently interpreting results (not separately reported) and communicating results to the patient/family/caregiver, or Care coordination (not separately reported).      Each patient to whom he or she provides medical services by telemedicine is:  (1) informed of the relationship between the physician and patient and the respective role of any other health care provider with respect to management of the patient; and (2) notified that he or she may decline to receive medical services by telemedicine and may withdraw from such care at any time.      8/2/22  RAUL is a 43 yo WM presenting today for HIV/HCV f/u visit.  He is HIV virally suppressed on Descovy & Pifeltro, last labs 6/28/22 VL UD, CD4 610.  Labs collected for HCV as well.  Baseline VL [id number], GT 1a, Fibrosure A0, F0.  He is not a candidate for FibroScan due to gastric pacer implanted for gastroparesis.  He is HCV treatment naive, recently diagnosed within the year. Additional  STI screenings negative 6/28/22 oral ct/gc, anal ct/gc, urine ct/gc, RPR non-reactive.  RUQ abdominal u/s completed 7/26/22 NL.  He is not a candidate for treatment with preferred treatment (Epclusa) due to GI treatment with Nexium and cannot be revised.  His GERD with esophagitis and gastroparesis are finally well controlled after years of battling with recurrent & persistent nausea, vomiting, and abdominal pain.  Will treat with Mavyret 3 tabs daily x 8 weeks. No significant DDIs with Mavyret & currently prescribed medications per [employer] interaction check.  He agrees to adhere to prescribed treatment plan & appreciates opportunity for telemedicine visits.  All questions answered & concerns addressed.     Review of Systems   Constitutional: Negative.    HENT: Negative.     Respiratory: Negative.     Cardiovascular: Negative.    Gastrointestinal: Negative.    Genitourinary: Negative.    Integumentary:  Negative.   Neurological: Negative.    Hematological: Negative.    Psychiatric/Behavioral: Negative.         Objective:      Physical Exam  Constitutional:       General: He is not in acute distress.     Appearance: Normal appearance.   HENT:      Head: Normocephalic.   Eyes:      Conjunctiva/sclera: Conjunctivae normal.   Pulmonary:      Effort: Pulmonary effort is normal.   Musculoskeletal:         General: Normal range of motion.      Cervical back: Normal range of motion.   Neurological:      General: No focal deficit present.      Mental Status: He is alert and oriented to person, place, and time. Mental status is at baseline.   Psychiatric:         Mood and Affect: Mood normal.         Behavior: Behavior normal.         Thought Content: Thought content normal.         Judgment: Judgment normal.       Assessment:       Problem List Items Addressed This Visit    None  Visit Diagnoses       HIV disease        Relevant Medications    emtricitabine-tenofovir alafen (DESCOVY) 200-25 mg Tab    doravirine (PIFELTRO)  100 mg Tab              Plan:       Chronic hepatitis C without hepatic coma    HX: Treatment naive.  GT 1a, baseline VL 389109  Fibrosure 6/28/22 A 0, F 0  FibroScan: not a candidate due to implanted gastric pacer  Not a candidate for Epclusa due to DDI with Nexium which is medically necessary   Completed Mavyret 3 tabs daily x 8 weeks on 10/6/22, started 8/10/22  HCV not detected 9/7/22, week 4 and 10/6/22 end of treatment  Blood & sex precautions: do not share a razor, needle, toothbrush, or clippers with anyone.  Partner tested HCV negative.  RTC 3 months in office with Mary Ann.  Will repeat labs at office visit.    HIV disease  -     emtricitabine-tenofovir alafen (DESCOVY) 200-25 mg Tab; Take 1 tablet by mouth Daily.  Dispense: 30 tablet; Refill: 4  -     doravirine (PIFELTRO) 100 mg Tab; Take 1 tablet (100 mg total) by mouth Daily.  Dispense: 30 tablet; Refill: 4     Adherence and sexual health counseling done.  Use condoms for all sexual encounters.  Blood precautions.   Continue Descovy & Pifeltro as directed.  Repeat labs at next visit.  RTC 3 month with Mary Ann in clinic.    HIV Wellness:  Anal pap: HRA with Biopsy 1/5/22  Oral CT/GC: 6/22 Neg  Anal CT/GC: 6/22 Neg  Urine CT/GC: 6/22 Neg  RPR: 6/22 Neg  Ophth:

## 2022-12-08 ENCOUNTER — TELEPHONE (OUTPATIENT)
Dept: INFECTIOUS DISEASES | Facility: CLINIC | Age: 42
End: 2022-12-08
Payer: MEDICARE

## 2022-12-08 NOTE — TELEPHONE ENCOUNTER
Spoke to Claude. He reports having a red bump on his bottom about a month ago. He thought it was an infected hair, but never got a hair out of it.  He now has 3 to 5 more bumps, that are clear fluid filled.  He doesn't know what the bumps are but feels it is not infected hairs.    The Band-Aids he put over the bumps are making his skin red.       Told him someone would call him back today or tomorrow.

## 2022-12-08 NOTE — TELEPHONE ENCOUNTER
----- Message from Sandra Mckinney sent at 12/8/2022  2:02 PM CST -----  Regarding: Pt Call  #Mary Ann#    Pt called asking to speak with mary ann about painful sores on his butt. Pt was told that mary ann was in clinic but we would send a message letting her Nurse know the situation, Pt agreed. Pt states that sores are not puss filled. Pt  thought maybe they were ingrown hairs but he does not believe that is the case anymore. Pt stated he's  been putting band aids on the sores. Please call pt . [phone]

## 2022-12-08 NOTE — TELEPHONE ENCOUNTER
Phoned pt, he will come in 12/9/22 at 1 pm for evaluation.  Please add him to schedule. Thank you.

## 2022-12-09 ENCOUNTER — OFFICE VISIT (OUTPATIENT)
Dept: INFECTIOUS DISEASES | Facility: CLINIC | Age: 42
End: 2022-12-09
Payer: MEDICARE

## 2022-12-09 VITALS
WEIGHT: 136 LBS | SYSTOLIC BLOOD PRESSURE: 138 MMHG | TEMPERATURE: 98 F | DIASTOLIC BLOOD PRESSURE: 72 MMHG | HEART RATE: 112 BPM | BODY MASS INDEX: 18.42 KG/M2 | OXYGEN SATURATION: 99 % | HEIGHT: 72 IN | RESPIRATION RATE: 18 BRPM

## 2022-12-09 DIAGNOSIS — Z11.3 ROUTINE SCREENING FOR STI (SEXUALLY TRANSMITTED INFECTION): ICD-10-CM

## 2022-12-09 DIAGNOSIS — L98.9 SKIN LESIONS, GENERALIZED: Primary | ICD-10-CM

## 2022-12-09 LAB
C TRACH DNA SPEC QL NAA+PROBE: NOT DETECTED
C TRACH DNA SPEC QL NAA+PROBE: NOT DETECTED
N GONORRHOEA DNA SPEC QL NAA+PROBE: NOT DETECTED
N GONORRHOEA DNA SPEC QL NAA+PROBE: NOT DETECTED
T PALLIDUM AB SER QL: NONREACTIVE

## 2022-12-09 PROCEDURE — 36415 COLL VENOUS BLD VENIPUNCTURE: CPT | Performed by: NURSE PRACTITIONER

## 2022-12-09 PROCEDURE — 99213 OFFICE O/P EST LOW 20 MIN: CPT | Mod: S$PBB,,, | Performed by: NURSE PRACTITIONER

## 2022-12-09 PROCEDURE — 99214 OFFICE O/P EST MOD 30 MIN: CPT | Mod: PBBFAC | Performed by: NURSE PRACTITIONER

## 2022-12-09 PROCEDURE — 87522 HEPATITIS C REVRS TRNSCRPJ: CPT | Performed by: NURSE PRACTITIONER

## 2022-12-09 PROCEDURE — 87491 CHLMYD TRACH DNA AMP PROBE: CPT | Performed by: NURSE PRACTITIONER

## 2022-12-09 PROCEDURE — 99213 PR OFFICE/OUTPT VISIT, EST, LEVL III, 20-29 MIN: ICD-10-PCS | Mod: S$PBB,,, | Performed by: NURSE PRACTITIONER

## 2022-12-09 PROCEDURE — 86780 TREPONEMA PALLIDUM: CPT | Performed by: NURSE PRACTITIONER

## 2022-12-09 PROCEDURE — 87591 N.GONORRHOEAE DNA AMP PROB: CPT | Performed by: NURSE PRACTITIONER

## 2022-12-09 RX ORDER — DOXYCYCLINE HYCLATE 100 MG
100 TABLET ORAL 2 TIMES DAILY
Qty: 14 TABLET | Refills: 0 | Status: SHIPPED | OUTPATIENT
Start: 2022-12-09 | End: 2022-12-16

## 2022-12-09 NOTE — PROGRESS NOTES
Subjective:       Patient ID: Claude Hall is a 42 y.o. male.    Chief Complaint: Follow-up (Hep C/B20)    12/9/22  RAUL is a 43 yo WM presenting today for evaluation of skin lesions that have been developing over the past couple of weeks.  The single lesions present as an ingrown hair, he tells me that he picks at it, the lesion begins to heal and a new lesion develops.  He has various lesions over his thighs, glutes, and forearms.  He tells me that he has not been sexually active since early Sept 2022 and has not used any meth for a couple of months either.  No active drainage noted from any of the lesions, no pain to touch, minimal surrounding erythema to lesions, various stages of healing.     10/25/22   RAUL is a 43 yo WM presenting today for HIV, HCV f/u visit.  He completed 8 weeks of Mavyret on 10/6/22 as scheduled and tolerated it well.  HCV not detected in blood at end of treatment on 10/6/22.  Will continue to follow for SVR 12.  Re-exposure risk reduction measures reviewed. Voiced understanding.  He continues to take Descovy & Pifeltro daily, tolerates it very well.  On 10/6/22, HIV VL UD, Cd4 818.  He received flu vax on 10/6/22 as ordered, MMR was not readily available.  Will revisit at next return appointment.  He has established care with Dr. Walter at [facility] for PCP & management of mental health medications only. All questions answered & concerns addressed.     9/27/22  RAUL is a 43 yo WM evaluated today via telemedicine for HCV/HIV f/u visit. He started Mavyret 3 tabs daily for 8 weeks on 8/10/22.  He is tolerating it well & denies noting any side effects. He tells me that he did miss one day due to medication availability at time of refill.  Otherwise, 100% adherent.  Labs collected 9/7/22 at week 4, HCV not detected.  He remains on Descovy & Pifeltro daily.  Last HIV labs 6/28/22 HIV UD, CD4 610.  Will repeat with next labs.  Blood & sex precautions strongly encouraged, risk reduction for  HCV re-exposure.  Vaccinations discussed.  Will get flu & 1st dose of MMR when he comes for labs 10/6/22.  Will plan to get monkeypox vaccine at [facility] & COVID variant specific booster in the community.  All questions answered & concerns addressed.        Review of Systems   Constitutional: Negative.    HENT: Negative.     Respiratory: Negative.     Cardiovascular: Negative.    Gastrointestinal: Negative.    Genitourinary: Negative.    Integumentary:  Positive for rash.   Neurological: Negative.    Hematological: Negative.    Psychiatric/Behavioral: Negative.         Objective:      Physical Exam  Vitals reviewed.   Constitutional:       General: He is not in acute distress.     Appearance: Normal appearance. He is not toxic-appearing.   Eyes:      General: No scleral icterus.  Cardiovascular:      Rate and Rhythm: Normal rate and regular rhythm.      Heart sounds: Normal heart sounds.   Pulmonary:      Effort: Pulmonary effort is normal. No respiratory distress.      Breath sounds: Normal breath sounds.   Abdominal:      General: Bowel sounds are normal. There is no distension.      Palpations: Abdomen is soft. There is no mass.      Tenderness: There is no abdominal tenderness.   Musculoskeletal:         General: Normal range of motion.   Skin:     General: Skin is warm and dry.      Findings: Lesion present.   Neurological:      Mental Status: He is alert and oriented to person, place, and time.       Assessment:       Problem List Items Addressed This Visit    None  Visit Diagnoses       Skin lesions, generalized    -  Primary    Relevant Medications    doxycycline (VIBRA-TABS) 100 MG tablet    Other Relevant Orders    SYPHILIS ANTIBODY (WITH REFLEX RPR)    C.trach/N.gonor AMP RNA    Chlamydia/GC, PCR    Chlamydia/GC, PCR    Hepatitis C Virus Quantitative    Routine screening for STI (sexually transmitted infection)        Relevant Orders    SYPHILIS ANTIBODY (WITH REFLEX RPR)    C.trach/N.gonor AMP RNA     Chlamydia/GC, PCR    Chlamydia/GC, PCR    Hepatitis C Virus Quantitative              Plan:         Skin lesions, generalized  -     SYPHILIS ANTIBODY (WITH REFLEX RPR); Future; Expected date: 12/09/2022  -     C.trach/N.gonor AMP RNA; Future; Expected date: 12/09/2022  -     Chlamydia/GC, PCR  -     Chlamydia/GC, PCR  -     Hepatitis C Virus Quantitative; Future; Expected date: 12/09/2022  -     doxycycline (VIBRA-TABS) 100 MG tablet; Take 1 tablet (100 mg total) by mouth 2 (two) times daily. for 7 days  Dispense: 14 tablet; Refill: 0  Do not pick at lesions.   Wash daily with Hibiclens.   Avoid all illicit substance use.   Doxycycline 100 mg po bid x 7 days.  Hand hygiene.  Notify me if do not resolve with antimicrobial treatment.     Routine screening for STI (sexually transmitted infection)  -     SYPHILIS ANTIBODY (WITH REFLEX RPR); Future; Expected date: 12/09/2022  -     C.trach/N.gonor AMP RNA; Future; Expected date: 12/09/2022  -     Chlamydia/GC, PCR  -     Chlamydia/GC, PCR  -     Hepatitis C Virus Quantitative; Future; Expected date: 12/09/2022  RPR, oral, anal, urine ct/gc & repeat HCV RNA today.

## 2022-12-13 LAB
C TRACH RRNA SPEC QL NAA+PROBE: NEGATIVE
HCV RNA SERPL NAA+PROBE-ACNC: NORMAL IU/ML
N GONORRHOEA RRNA SPEC QL NAA+PROBE: NEGATIVE
SPECIMEN SOURCE: NORMAL
SPECIMEN SOURCE: NORMAL

## 2023-01-05 DIAGNOSIS — E55.9 VITAMIN D DEFICIENCY: ICD-10-CM

## 2023-01-05 RX ORDER — ERGOCALCIFEROL 1.25 MG/1
50000 CAPSULE ORAL
Qty: 5 CAPSULE | Refills: 4 | Status: SHIPPED | OUTPATIENT
Start: 2023-01-05 | End: 2023-03-20 | Stop reason: SDUPTHER

## 2023-02-07 ENCOUNTER — OFFICE VISIT (OUTPATIENT)
Dept: INFECTIOUS DISEASES | Facility: CLINIC | Age: 43
End: 2023-02-07
Payer: MEDICARE

## 2023-02-07 VITALS
SYSTOLIC BLOOD PRESSURE: 143 MMHG | WEIGHT: 131.69 LBS | DIASTOLIC BLOOD PRESSURE: 79 MMHG | BODY MASS INDEX: 17.84 KG/M2 | RESPIRATION RATE: 18 BRPM | TEMPERATURE: 98 F | HEART RATE: 114 BPM | HEIGHT: 72 IN

## 2023-02-07 DIAGNOSIS — Z79.899 OTHER LONG TERM (CURRENT) DRUG THERAPY: ICD-10-CM

## 2023-02-07 DIAGNOSIS — K76.9 LIVER DISEASE, UNSPECIFIED: ICD-10-CM

## 2023-02-07 DIAGNOSIS — K90.89 OTHER INTESTINAL MALABSORPTION: ICD-10-CM

## 2023-02-07 DIAGNOSIS — B20 HIV DISEASE: Primary | ICD-10-CM

## 2023-02-07 DIAGNOSIS — B18.2 CHRONIC HEPATITIS C WITHOUT HEPATIC COMA: ICD-10-CM

## 2023-02-07 DIAGNOSIS — R68.89 OTHER GENERAL SYMPTOMS AND SIGNS: ICD-10-CM

## 2023-02-07 LAB
ALBUMIN SERPL-MCNC: 4.4 G/DL (ref 3.5–5)
ALBUMIN/GLOB SERPL: 1.3 RATIO (ref 1.1–2)
ALP SERPL-CCNC: 74 UNIT/L (ref 40–150)
ALT SERPL-CCNC: 13 UNIT/L (ref 0–55)
APPEARANCE UR: CLEAR
AST SERPL-CCNC: 13 UNIT/L (ref 5–34)
BACTERIA #/AREA URNS AUTO: ABNORMAL /HPF
BASOPHILS # BLD AUTO: 0.06 X10(3)/MCL (ref 0–0.2)
BASOPHILS NFR BLD AUTO: 0.6 %
BILIRUB UR QL STRIP.AUTO: NEGATIVE MG/DL
BILIRUBIN DIRECT+TOT PNL SERPL-MCNC: 0.6 MG/DL
BUN SERPL-MCNC: 14.5 MG/DL (ref 8.9–20.6)
CALCIUM SERPL-MCNC: 10.4 MG/DL (ref 8.4–10.2)
CHLORIDE SERPL-SCNC: 103 MMOL/L (ref 98–107)
CHOLEST SERPL-MCNC: 225 MG/DL
CHOLEST/HDLC SERPL: 4 {RATIO} (ref 0–5)
CO2 SERPL-SCNC: 28 MMOL/L (ref 22–29)
COLOR UR AUTO: YELLOW
CREAT SERPL-MCNC: 1.05 MG/DL (ref 0.73–1.18)
DEPRECATED CALCIDIOL+CALCIFEROL SERPL-MC: 42.9 NG/ML (ref 30–80)
EOSINOPHIL # BLD AUTO: 0.16 X10(3)/MCL (ref 0–0.9)
EOSINOPHIL NFR BLD AUTO: 1.7 %
ERYTHROCYTE [DISTWIDTH] IN BLOOD BY AUTOMATED COUNT: 17.2 % (ref 11.5–17)
EST. AVERAGE GLUCOSE BLD GHB EST-MCNC: 105.4 MG/DL
GFR SERPLBLD CREATININE-BSD FMLA CKD-EPI: >60 MLS/MIN/1.73/M2
GLOBULIN SER-MCNC: 3.5 GM/DL (ref 2.4–3.5)
GLUCOSE SERPL-MCNC: 83 MG/DL (ref 74–100)
GLUCOSE UR QL STRIP.AUTO: NORMAL MG/DL
HBA1C MFR BLD: 5.3 %
HCT VFR BLD AUTO: 45.7 % (ref 42–52)
HDLC SERPL-MCNC: 60 MG/DL (ref 35–60)
HGB BLD-MCNC: 14.2 GM/DL (ref 14–18)
HYALINE CASTS #/AREA URNS LPF: ABNORMAL /LPF
IMM GRANULOCYTES # BLD AUTO: 0.03 X10(3)/MCL (ref 0–0.04)
IMM GRANULOCYTES NFR BLD AUTO: 0.3 %
KETONES UR QL STRIP.AUTO: ABNORMAL MG/DL
LDLC SERPL CALC-MCNC: 120 MG/DL (ref 50–140)
LEUKOCYTE ESTERASE UR QL STRIP.AUTO: NEGATIVE UNIT/L
LYMPHOCYTES # BLD AUTO: 2.97 X10(3)/MCL (ref 0.6–4.6)
LYMPHOCYTES NFR BLD AUTO: 31.7 %
MCH RBC QN AUTO: 26.5 PG
MCHC RBC AUTO-ENTMCNC: 31.1 MG/DL (ref 33–36)
MCV RBC AUTO: 85.3 FL (ref 80–94)
MONOCYTES # BLD AUTO: 0.6 X10(3)/MCL (ref 0.1–1.3)
MONOCYTES NFR BLD AUTO: 6.4 %
MUCOUS THREADS URNS QL MICRO: ABNORMAL /LPF
NEUTROPHILS # BLD AUTO: 5.54 X10(3)/MCL (ref 2.1–9.2)
NEUTROPHILS NFR BLD AUTO: 59.3 %
NITRITE UR QL STRIP.AUTO: NEGATIVE
NRBC BLD AUTO-RTO: 0 %
PH UR STRIP.AUTO: 6 [PH]
PLATELET # BLD AUTO: 357 X10(3)/MCL (ref 130–400)
PMV BLD AUTO: 10.1 FL (ref 7.4–10.4)
POTASSIUM SERPL-SCNC: 4.4 MMOL/L (ref 3.5–5.1)
PROT SERPL-MCNC: 7.9 GM/DL (ref 6.4–8.3)
PROT UR QL STRIP.AUTO: ABNORMAL MG/DL
RBC # BLD AUTO: 5.36 X10(6)/MCL (ref 4.7–6.1)
RBC #/AREA URNS AUTO: ABNORMAL /HPF
RBC UR QL AUTO: NEGATIVE UNIT/L
SODIUM SERPL-SCNC: 141 MMOL/L (ref 136–145)
SP GR UR STRIP.AUTO: 1.03
SQUAMOUS #/AREA URNS LPF: ABNORMAL /HPF
T PALLIDUM AB SER QL: NONREACTIVE
T4 FREE SERPL-MCNC: 0.99 NG/DL (ref 0.7–1.48)
TRIGL SERPL-MCNC: 225 MG/DL (ref 34–140)
TSH SERPL-ACNC: 0.22 UIU/ML (ref 0.35–4.94)
UROBILINOGEN UR STRIP-ACNC: ABNORMAL MG/DL
VLDLC SERPL CALC-MCNC: 45 MG/DL
WBC # SPEC AUTO: 9.4 X10(3)/MCL (ref 4.5–11.5)
WBC #/AREA URNS AUTO: ABNORMAL /HPF

## 2023-02-07 PROCEDURE — 87522 HEPATITIS C REVRS TRNSCRPJ: CPT | Performed by: NURSE PRACTITIONER

## 2023-02-07 PROCEDURE — 86360 T CELL ABSOLUTE COUNT/RATIO: CPT | Performed by: NURSE PRACTITIONER

## 2023-02-07 PROCEDURE — 87536 HIV-1 QUANT&REVRSE TRNSCRPJ: CPT | Performed by: NURSE PRACTITIONER

## 2023-02-07 PROCEDURE — 82306 VITAMIN D 25 HYDROXY: CPT | Performed by: NURSE PRACTITIONER

## 2023-02-07 PROCEDURE — 81001 URINALYSIS AUTO W/SCOPE: CPT | Performed by: NURSE PRACTITIONER

## 2023-02-07 PROCEDURE — 86359 T CELLS TOTAL COUNT: CPT | Performed by: NURSE PRACTITIONER

## 2023-02-07 PROCEDURE — 80061 LIPID PANEL: CPT | Performed by: NURSE PRACTITIONER

## 2023-02-07 PROCEDURE — 80053 COMPREHEN METABOLIC PANEL: CPT | Performed by: NURSE PRACTITIONER

## 2023-02-07 PROCEDURE — 84439 ASSAY OF FREE THYROXINE: CPT | Performed by: NURSE PRACTITIONER

## 2023-02-07 PROCEDURE — 99214 OFFICE O/P EST MOD 30 MIN: CPT | Mod: S$PBB,,, | Performed by: NURSE PRACTITIONER

## 2023-02-07 PROCEDURE — 86780 TREPONEMA PALLIDUM: CPT | Performed by: NURSE PRACTITIONER

## 2023-02-07 PROCEDURE — 36415 COLL VENOUS BLD VENIPUNCTURE: CPT | Performed by: NURSE PRACTITIONER

## 2023-02-07 PROCEDURE — 83036 HEMOGLOBIN GLYCOSYLATED A1C: CPT | Performed by: NURSE PRACTITIONER

## 2023-02-07 PROCEDURE — 99214 OFFICE O/P EST MOD 30 MIN: CPT | Mod: PBBFAC | Performed by: NURSE PRACTITIONER

## 2023-02-07 PROCEDURE — 99214 PR OFFICE/OUTPT VISIT, EST, LEVL IV, 30-39 MIN: ICD-10-PCS | Mod: S$PBB,,, | Performed by: NURSE PRACTITIONER

## 2023-02-07 PROCEDURE — 84443 ASSAY THYROID STIM HORMONE: CPT | Performed by: NURSE PRACTITIONER

## 2023-02-07 PROCEDURE — 86480 TB TEST CELL IMMUN MEASURE: CPT | Performed by: NURSE PRACTITIONER

## 2023-02-07 PROCEDURE — 85025 COMPLETE CBC W/AUTO DIFF WBC: CPT | Performed by: NURSE PRACTITIONER

## 2023-02-07 RX ORDER — EMTRICITABINE AND TENOFOVIR ALAFENAMIDE 200; 25 MG/1; MG/1
1 TABLET ORAL DAILY
Qty: 30 TABLET | Refills: 4 | Status: SHIPPED | OUTPATIENT
Start: 2023-02-07 | End: 2023-05-02

## 2023-02-07 RX ORDER — DORAVIRINE 100 MG/1
100 TABLET, FILM COATED ORAL DAILY
Qty: 30 TABLET | Refills: 4 | Status: SHIPPED | OUTPATIENT
Start: 2023-02-07 | End: 2023-07-18 | Stop reason: SDUPTHER

## 2023-02-07 NOTE — PROGRESS NOTES
Subjective:       Patient ID: Claude Hall is a 42 y.o. male.    Chief Complaint: Followup Hep C    2/7/23  RAUL is a 43 yo WM here today for f/u visit HIV/HCV.  He is taking Descovy & Pifeltro daily as prescribed.  Tolerates well.  Labs 10/6/22 VL UD, CD4 818.  He completed 8 weeks of Mavyret on 10/6/22, HCV not detected 12/9/22.  Will collect HCV RNA today for SVR 12  assessment.  Rash resolved.  Immunizations are UTD.  He tells me that he is having some GI upset with N/V, will f/u with MD that manages his gastric pacer.  All questions answered & concerns addressed.    12/9/22  RAUL is a 43 yo WM presenting today for evaluation of skin lesions that have been developing over the past couple of weeks.  The single lesions present as an ingrown hair, he tells me that he picks at it, the lesion begins to heal and a new lesion develops.  He has various lesions over his thighs, glutes, and forearms.  He tells me that he has not been sexually active since early Sept 2022 and has not used any meth for a couple of months either.  No active drainage noted from any of the lesions, no pain to touch, minimal surrounding erythema to lesions, various stages of healing.      10/25/22   RAUL is a 43 yo WM presenting today for HIV, HCV f/u visit.  He completed 8 weeks of Mavyret on 10/6/22 as scheduled and tolerated it well.  HCV not detected in blood at end of treatment on 10/6/22.  Will continue to follow for SVR 12.  Re-exposure risk reduction measures reviewed. Voiced understanding.  He continues to take Descovy & Pifeltro daily, tolerates it very well.  On 10/6/22, HIV VL UD, Cd4 818.  He received flu vax on 10/6/22 as ordered, MMR was not readily available.  Will revisit at next return appointment.  He has established care with Dr. Walter at [facility] for PCP & management of mental health medications only. All questions answered & concerns addressed.    Review of Systems   Constitutional: Negative.    HENT: Negative.      Respiratory: Negative.     Cardiovascular: Negative.    Gastrointestinal: Negative.    Genitourinary: Negative.    Integumentary:  Negative.   Neurological: Negative.    Hematological: Negative.    Psychiatric/Behavioral: Negative.         Objective:      Physical Exam  Vitals reviewed.   Constitutional:       General: He is not in acute distress.     Appearance: Normal appearance. He is not toxic-appearing.   HENT:      Mouth/Throat:      Mouth: Mucous membranes are moist.      Pharynx: Oropharynx is clear.   Eyes:      Conjunctiva/sclera: Conjunctivae normal.   Cardiovascular:      Rate and Rhythm: Normal rate and regular rhythm.   Pulmonary:      Effort: Pulmonary effort is normal. No respiratory distress.      Breath sounds: Normal breath sounds.   Abdominal:      General: Abdomen is flat. Bowel sounds are normal.      Palpations: Abdomen is soft.   Musculoskeletal:         General: Normal range of motion.      Cervical back: Normal range of motion.   Lymphadenopathy:      Cervical: No cervical adenopathy.   Skin:     General: Skin is warm and dry.   Neurological:      General: No focal deficit present.      Mental Status: He is alert and oriented to person, place, and time. Mental status is at baseline.   Psychiatric:         Mood and Affect: Mood normal.         Behavior: Behavior normal.       Assessment:       Problem List Items Addressed This Visit    None  Visit Diagnoses       HIV disease    -  Primary    Chronic hepatitis C without hepatic coma                  Plan:           HIV disease  -     HIV-1 RNA, Quantitative, PCR with Reflex to Genotype; Future; Expected date: 02/07/2023  -     CD4 T-[place] Cells; Future; Expected date: 02/07/2023  -     Urinalysis; Future; Expected date: 02/07/2023  -     Vitamin D; Future; Expected date: 02/07/2023  -     TSH; Future; Expected date: 02/07/2023  -     Hemoglobin A1C; Future; Expected date: 02/07/2023  -     SYPHILIS ANTIBODY (WITH REFLEX RPR); Future;  Expected date: 02/07/2023  -     Lipid Panel; Future; Expected date: 02/07/2023  -     Chlamydia/GC, PCR; Future; Expected date: 02/07/2023  -     Comprehensive Metabolic Panel; Future; Expected date: 02/07/2023  -     CBC Auto Differential; Future; Expected date: 02/07/2023  -     Quantiferon Gold TB; Future; Expected date: 02/07/2023  -     doravirine (PIFELTRO) 100 mg Tab; Take 1 tablet (100 mg total) by mouth Daily.  Dispense: 30 tablet; Refill: 4  -     emtricitabine-tenofovir alafen (DESCOVY) 200-25 mg Tab; Take 1 tablet by mouth Daily.  Dispense: 30 tablet; Refill: 4  Adherence and sexual health counseling done.  Use condoms for all sexual encounters.  Blood precautions.   Continue Descovy & Pifeltro as directed.  Repeat labs today.  RTC 3 month with Mary Ann in clinic.     HIV Wellness:  Anal pap: HRA with Biopsy 1/5/22  Oral CT/GC: 12/22 Neg  Anal CT/GC: 12/22 Neg  Urine CT/GC: 12/22 Neg  RPR: 12/22 Neg  Ophth:     Chronic hepatitis C without hepatic coma  -     Hepatitis C Virus Quantitative; Future; Expected date: 02/07/2023  HX: Treatment naive.  GT 1a, baseline VL [id number]  Fibrosure 6/28/22 A 0, F 0  FibroScan: not a candidate due to implanted gastric pacer  Not a candidate for Epclusa due to DDI with Nexium which is medically necessary   Completed Mavyret 3 tabs daily x 8 weeks on 10/6/22, started 8/10/22  HCV not detected 9/7/22, week 4 and 10/6/22 end of treatment  Blood & sex precautions: do not share a razor, needle, toothbrush, or clippers with anyone.  Partner tested HCV negative.  Repeat HCV VL today for SVR 12 evaluation.     Other intestinal malabsorption  -     Vitamin D; Future; Expected date: 02/07/2023    Other general symptoms and signs  -     TSH; Future; Expected date: 02/07/2023    Other long term (current) drug therapy  -     Hemoglobin A1C; Future; Expected date: 02/07/2023    Liver disease, unspecified  -     Lipid Panel; Future; Expected date: 02/07/2023

## 2023-02-07 NOTE — LETTER
February 7, 2023      [facility]  [address] [facility] [address]  Phone: [phone]       Patient: Claude Hall   YOB: 1980  Date of Visit: 02/07/2023    To Whom It May Concern:    Heber Hall  was at [facility] on 02/07/2023. The patient may return to work/school on 02/08/2023 With/no restrictions. If you have any questions or concerns, or if I can be of further assistance, please do not hesitate to contact me.    Sincerely,    Frances Moore LPN

## 2023-02-09 LAB
CD3 CELLS # BLD: 1648 CELLS/MCL (ref 550–2202)
CD3 CELLS NFR BLD: 74 % (ref 58–86)
CD3+CD4+ CELLS # BLD: 934 CELLS/MCL (ref 365–1437)
CD3+CD4+ CELLS NFR BLD: 42 % (ref 32–64)
CD3+CD4+ CELLS/CD3+CD8+ CLL BLD: 1.4 %
CD3+CD8+ CELLS # BLD: 682 CELLS/MCL (ref 145–846)
CD3+CD8+ CELLS NFR BLD: 31 % (ref 13–40)
CD45 CELLS # BLD: 2.24 THOU/MCL (ref 0.82–2.84)
GAMMA INTERFERON BACKGROUND BLD IA-ACNC: 0.01 IU/ML
HCV RNA SERPL NAA+PROBE-ACNC: NORMAL IU/ML
HIV1 RNA # PLAS NAA DL=20: NORMAL COPIES/ML
M TB IFN-G BLD-IMP: NEGATIVE
M TB IFN-G CD4+ BCKGRND COR BLD-ACNC: 0.02 IU/ML
M TB IFN-G CD4+CD8+ BCKGRND COR BLD-ACNC: 0.05 IU/ML
MITOGEN IGNF BCKGRD COR BLD-ACNC: >10 IU/ML

## 2023-02-17 ENCOUNTER — TELEPHONE (OUTPATIENT)
Dept: INFECTIOUS DISEASES | Facility: CLINIC | Age: 43
End: 2023-02-17
Payer: MEDICARE

## 2023-02-17 NOTE — TELEPHONE ENCOUNTER
Called pt with ND SVR. He was happy with the results. Instructed him will always test positive for Hep C. He tells me Mary Ann already told him all about it.

## 2023-02-27 ENCOUNTER — HOSPITAL ENCOUNTER (EMERGENCY)
Facility: HOSPITAL | Age: 43
Discharge: HOME | End: 2023-02-27
Attending: FAMILY MEDICINE
Payer: MEDICARE

## 2023-02-27 VITALS
OXYGEN SATURATION: 100 % | RESPIRATION RATE: 16 BRPM | WEIGHT: 131.81 LBS | HEART RATE: 71 BPM | DIASTOLIC BLOOD PRESSURE: 105 MMHG | BODY MASS INDEX: 17.85 KG/M2 | TEMPERATURE: 98 F | SYSTOLIC BLOOD PRESSURE: 169 MMHG | HEIGHT: 72 IN

## 2023-02-27 DIAGNOSIS — N50.812 LEFT TESTICULAR PAIN: ICD-10-CM

## 2023-02-27 LAB
ALBUMIN SERPL-MCNC: 4.3 G/DL (ref 3.5–5)
ALBUMIN/GLOB SERPL: 1.3 RATIO (ref 1.1–2)
ALP SERPL-CCNC: 66 UNIT/L (ref 40–150)
ALT SERPL-CCNC: 13 UNIT/L (ref 0–55)
APPEARANCE UR: CLEAR
AST SERPL-CCNC: 14 UNIT/L (ref 5–34)
BACTERIA #/AREA URNS AUTO: ABNORMAL /HPF
BASOPHILS # BLD AUTO: 0.07 X10(3)/MCL (ref 0–0.2)
BASOPHILS NFR BLD AUTO: 0.8 %
BILIRUB UR QL STRIP.AUTO: NEGATIVE MG/DL
BILIRUBIN DIRECT+TOT PNL SERPL-MCNC: 0.3 MG/DL
BUN SERPL-MCNC: 14.2 MG/DL (ref 8.9–20.6)
CALCIUM SERPL-MCNC: 9.7 MG/DL (ref 8.4–10.2)
CHLORIDE SERPL-SCNC: 103 MMOL/L (ref 98–107)
CO2 SERPL-SCNC: 29 MMOL/L (ref 22–29)
COLOR UR AUTO: ABNORMAL
CREAT SERPL-MCNC: 1.15 MG/DL (ref 0.73–1.18)
EOSINOPHIL # BLD AUTO: 0.35 X10(3)/MCL (ref 0–0.9)
EOSINOPHIL NFR BLD AUTO: 4 %
ERYTHROCYTE [DISTWIDTH] IN BLOOD BY AUTOMATED COUNT: 15.9 % (ref 11.5–17)
GFR SERPLBLD CREATININE-BSD FMLA CKD-EPI: >60 MLS/MIN/1.73/M2
GLOBULIN SER-MCNC: 3.4 GM/DL (ref 2.4–3.5)
GLUCOSE SERPL-MCNC: 108 MG/DL (ref 74–100)
GLUCOSE UR QL STRIP.AUTO: NORMAL MG/DL
HCT VFR BLD AUTO: 42.8 % (ref 42–52)
HGB BLD-MCNC: 13.2 G/DL (ref 14–18)
HYALINE CASTS #/AREA URNS LPF: ABNORMAL /LPF
IMM GRANULOCYTES # BLD AUTO: 0.02 X10(3)/MCL (ref 0–0.04)
IMM GRANULOCYTES NFR BLD AUTO: 0.2 %
KETONES UR QL STRIP.AUTO: NEGATIVE MG/DL
LEUKOCYTE ESTERASE UR QL STRIP.AUTO: NEGATIVE UNIT/L
LYMPHOCYTES # BLD AUTO: 2.21 X10(3)/MCL (ref 0.6–4.6)
LYMPHOCYTES NFR BLD AUTO: 25.1 %
MCH RBC QN AUTO: 26.8 PG
MCHC RBC AUTO-ENTMCNC: 30.8 G/DL (ref 33–36)
MCV RBC AUTO: 86.8 FL (ref 80–94)
MONOCYTES # BLD AUTO: 0.47 X10(3)/MCL (ref 0.1–1.3)
MONOCYTES NFR BLD AUTO: 5.3 %
MUCOUS THREADS URNS QL MICRO: ABNORMAL /LPF
NEUTROPHILS # BLD AUTO: 5.69 X10(3)/MCL (ref 2.1–9.2)
NEUTROPHILS NFR BLD AUTO: 64.6 %
NITRITE UR QL STRIP.AUTO: NEGATIVE
NRBC BLD AUTO-RTO: 0 %
PH UR STRIP.AUTO: 7 [PH]
PLATELET # BLD AUTO: 291 X10(3)/MCL (ref 130–400)
PMV BLD AUTO: 9.5 FL (ref 7.4–10.4)
POTASSIUM SERPL-SCNC: 4.2 MMOL/L (ref 3.5–5.1)
PROT SERPL-MCNC: 7.7 GM/DL (ref 6.4–8.3)
PROT UR QL STRIP.AUTO: NEGATIVE MG/DL
RBC # BLD AUTO: 4.93 X10(6)/MCL (ref 4.7–6.1)
RBC #/AREA URNS AUTO: ABNORMAL /HPF
RBC UR QL AUTO: NEGATIVE UNIT/L
SODIUM SERPL-SCNC: 141 MMOL/L (ref 136–145)
SP GR UR STRIP.AUTO: 1.02
SQUAMOUS #/AREA URNS LPF: ABNORMAL /HPF
UROBILINOGEN UR STRIP-ACNC: NORMAL MG/DL
WBC # SPEC AUTO: 8.8 X10(3)/MCL (ref 4.5–11.5)
WBC #/AREA URNS AUTO: ABNORMAL /HPF

## 2023-02-27 PROCEDURE — 25000003 PHARM REV CODE 250: Performed by: NURSE PRACTITIONER

## 2023-02-27 PROCEDURE — 81001 URINALYSIS AUTO W/SCOPE: CPT | Performed by: NURSE PRACTITIONER

## 2023-02-27 PROCEDURE — 85025 COMPLETE CBC W/AUTO DIFF WBC: CPT | Performed by: NURSE PRACTITIONER

## 2023-02-27 PROCEDURE — 99284 EMERGENCY DEPT VISIT MOD MDM: CPT

## 2023-02-27 PROCEDURE — 80053 COMPREHEN METABOLIC PANEL: CPT | Performed by: NURSE PRACTITIONER

## 2023-02-27 RX ORDER — MELOXICAM 7.5 MG/1
7.5 TABLET ORAL DAILY
Qty: 14 TABLET | Refills: 0 | Status: SHIPPED | OUTPATIENT
Start: 2023-02-27 | End: 2023-03-13

## 2023-02-27 RX ORDER — KETOROLAC TROMETHAMINE 10 MG/1
10 TABLET, FILM COATED ORAL
Status: COMPLETED | OUTPATIENT
Start: 2023-02-27 | End: 2023-02-27

## 2023-02-27 RX ADMIN — KETOROLAC TROMETHAMINE 10 MG: 10 TABLET, FILM COATED ORAL at 07:02

## 2023-02-27 NOTE — ED PROVIDER NOTES
Encounter Date: 2/27/2023       History     Chief Complaint   Patient presents with    Testicle Pain     Left testicular pain x 3 days with nausea     Pt is a 42 y.o. male who presents to the [facility] ED complaining of Lt testicular pain. Symptoms present x 3 days. Denies trauma to area, redness to testicles., fever, penile discharge, testicular swelling, chest pain, SOB, or fever. Hx of HIV. Reports full compliance with prescribed medications.     Review of patient's allergies indicates:   Allergen Reactions    Lorazepam Other (See Comments) and Anxiety     Twitching, involuntary movements.   Other reaction(s): violent twitching, increased anxiety  Loose time, twitching, and he doesn't remember anything.      Adhesive      Past Medical History:   Diagnosis Date    ADHD (attention deficit hyperactivity disorder)     Anemia     Gastroparesis     HIV (human immunodeficiency virus infection)      Past Surgical History:   Procedure Laterality Date    FRACTURE SURGERY  1997    Left arm broken    gastric pacemaker       Family History   Problem Relation Age of Onset    COPD Mother     Diabetes Mother     No Known Problems Father      Social History     Tobacco Use    Smoking status: Light Smoker     Packs/day: 0.25     Years: 20.00     Pack years: 5.00     Types: Cigarettes    Smokeless tobacco: Never   Substance Use Topics    Alcohol use: Not Currently    Drug use: Not Currently     Review of Systems   Constitutional:  Negative for chills, diaphoresis, fatigue and fever.   HENT:  Negative for facial swelling, postnasal drip, rhinorrhea, sinus pressure, sinus pain, sore throat and trouble swallowing.    Respiratory:  Negative for cough, chest tightness, shortness of breath and wheezing.    Cardiovascular:  Negative for chest pain, palpitations and leg swelling.   Gastrointestinal:  Negative for abdominal pain, diarrhea, nausea and vomiting.   Genitourinary:  Positive for testicular pain. Negative for dysuria, flank pain,  hematuria, penile pain, penile swelling, scrotal swelling and urgency.   Musculoskeletal:  Negative for arthralgias, back pain and myalgias.   Skin:  Negative for color change and rash.   Neurological:  Negative for dizziness, syncope, weakness and headaches.   Hematological:  Does not bruise/bleed easily.   All other systems reviewed and are negative.    Physical Exam     Initial Vitals [02/27/23 0719]   BP Pulse Resp Temp SpO2   (!) 177/114 91 16 97.9 °F (36.6 °C) 98 %      MAP       --         Physical Exam    Nursing note and vitals reviewed.  Constitutional: Vital signs are normal. He appears well-developed and well-nourished.   HENT:   Head: Normocephalic.   Nose: Nose normal.   Mouth/Throat: Oropharynx is clear and moist.   Eyes: Conjunctivae and EOM are normal. Pupils are equal, round, and reactive to light.   Neck: Neck supple.   Normal range of motion.  Cardiovascular:  Normal rate, regular rhythm, normal heart sounds and intact distal pulses.           Pulmonary/Chest: Effort normal and breath sounds normal. No respiratory distress. He has no wheezes. He has no rhonchi. He has no rales. He exhibits no tenderness.   Abdominal: Abdomen is soft and flat. Bowel sounds are normal. There is no abdominal tenderness. There is no rebound, no guarding, no tenderness at McBurney's point and negative Garcia's sign.   Genitourinary: Cremasteric reflex is present. Left testis shows tenderness. Left testis shows no swelling. Cremasteric reflex is not absent on the left side.       Musculoskeletal:         General: Normal range of motion.      Cervical back: Normal range of motion and neck supple.     Neurological: He is alert and oriented to person, place, and time. He has normal strength.   Skin: Skin is warm and dry. Capillary refill takes less than 2 seconds.   Psychiatric: He has a normal mood and affect. His behavior is normal. Judgment and thought content normal.       ED Course   Procedures  Labs Reviewed    COMPREHENSIVE METABOLIC PANEL - Abnormal; Notable for the following components:       Result Value    Glucose Level 108 (*)     All other components within normal limits   URINALYSIS, REFLEX TO URINE CULTURE - Abnormal; Notable for the following components:    Squamous Epithelial Cells, UA Trace (*)     Mucous, UA Trace (*)     All other components within normal limits   CBC WITH DIFFERENTIAL - Abnormal; Notable for the following components:    Hgb 13.2 (*)     MCHC 30.8 (*)     All other components within normal limits   CBC W/ AUTO DIFFERENTIAL    Narrative:     The following orders were created for panel order CBC auto differential.  Procedure                               Abnormality         Status                     ---------                               -----------         ------                     CBC with Differential[[id number]]        Abnormal            Final result                 Please view results for these tests on the individual orders.   EXTRA TUBES    Narrative:     The following orders were created for panel order EXTRA TUBES.  Procedure                               Abnormality         Status                     ---------                               -----------         ------                     Light Blue Top Hold[[id number]]                              In process                 Gold Top Hold[[id number]]                                    In process                 Pink Top Hold[[id number]]                                    In process                   Please view results for these tests on the individual orders.   LIGHT BLUE TOP HOLD   GOLD TOP HOLD   PINK TOP HOLD          Imaging Results              US Scrotum And Testicles (Final result)  Result time 02/27/23 09:05:57      Final result by Chris Jacques MD (02/27/23 09:05:57)                   Impression:      No sonographic evidence for testicular torsion.    Nonspecific score wall thickening.      Electronically signed  by: Chris Jacques MD  Date:    02/27/2023  Time:    09:05               Narrative:    EXAMINATION:  Ultrasound scrotum and testicles    CLINICAL HISTORY:  Left testicular pain 3 days    COMPARISON:  05/31/2022    FINDINGS:  The right testicle measures 4.1 x 1.6 x 2.6 cm.  The left testicle measures 3.6 x 1.6 x 2.6 cm.  No discrete intratesticular lesion.  There is normal arteriovenous flow to both testes.  The epididymi appear normal.  There is mild thickening of the scrotal wall.  No hydrocele.  No varicocele.                                       Medications   ketorolac tablet 10 mg (10 mg Oral Given 2/27/23 0755)     Medical Decision Making:   History:   Old Medical Records: I decided to obtain old medical records.  Old Records Summarized: records from previous admission(s).       <> Summary of Records: 02/07/23 10:45  QuantiFERON-Tb Gold Plus Result: Negative  Mitogen minus Nil Result: >10.00  Nil Result: 0.01  TB1 Ag minus Nil Result: 0.02  TB2 Ag minus Nil Result: 0.05  Syphilis Antibody: Nonreactive  Differential Diagnosis:   Epididymitis  Strain  Hernia      Clinical Tests:   Lab Tests: Ordered and Reviewed  Radiological Study: Ordered and Reviewed  ED Management:  9:58 AM Reassessed patient at this time. Reports condition has improved. Discussed with patient all pertinent ED information and results. Discussed diagnosis and treatment plan with patient. Follow up instructions and return to ED instruction have been given. All questions and concerns were addressed at this time. Patient voices understanding of information and instructions. Patient is comfortable with plan and discharge. Patient is stable for discharge.                           Clinical Impression:   Final diagnoses:  [N50.812] Left testicular pain        ED Disposition Condition    Discharge Stable          ED Prescriptions       Medication Sig Dispense Start Date End Date Auth. Provider    meloxicam (MOBIC) 7.5 MG tablet Take 1 tablet (7.5 mg  total) by mouth once daily. for 14 days 14 tablet 2/27/2023 3/13/2023 Gabriel Vernon Jr., RUIZP          Follow-up Information       Follow up With Specialties Details Why Contact Info    Orion Andrade MD Family Medicine In 3 days  [address] [facility] [address]  [phone]      [facility] Emergency Medicine In 3 days As needed, If symptoms worsen [address] [facility] [address] [phone]             Gabriel Vernon Jr., RUIZP  02/27/23 1001

## 2023-03-20 ENCOUNTER — OFFICE VISIT (OUTPATIENT)
Dept: FAMILY MEDICINE | Facility: CLINIC | Age: 43
End: 2023-03-20
Payer: MEDICARE

## 2023-03-20 VITALS
DIASTOLIC BLOOD PRESSURE: 96 MMHG | SYSTOLIC BLOOD PRESSURE: 147 MMHG | WEIGHT: 137 LBS | HEART RATE: 87 BPM | BODY MASS INDEX: 18.56 KG/M2 | RESPIRATION RATE: 18 BRPM | OXYGEN SATURATION: 96 % | TEMPERATURE: 98 F | HEIGHT: 72 IN

## 2023-03-20 DIAGNOSIS — Z76.89 ENCOUNTER TO ESTABLISH CARE: ICD-10-CM

## 2023-03-20 DIAGNOSIS — E55.9 VITAMIN D DEFICIENCY: ICD-10-CM

## 2023-03-20 DIAGNOSIS — K21.9 GASTROESOPHAGEAL REFLUX DISEASE WITHOUT ESOPHAGITIS: ICD-10-CM

## 2023-03-20 DIAGNOSIS — R03.0 ELEVATED BLOOD-PRESSURE READING WITHOUT DIAGNOSIS OF HYPERTENSION: Primary | ICD-10-CM

## 2023-03-20 PROCEDURE — 3008F PR BODY MASS INDEX (BMI) DOCUMENTED: ICD-10-PCS | Mod: CPTII,,, | Performed by: NURSE PRACTITIONER

## 2023-03-20 PROCEDURE — 3080F DIAST BP >= 90 MM HG: CPT | Mod: CPTII,,, | Performed by: NURSE PRACTITIONER

## 2023-03-20 PROCEDURE — 99204 OFFICE O/P NEW MOD 45 MIN: CPT | Mod: S$PBB,,, | Performed by: NURSE PRACTITIONER

## 2023-03-20 PROCEDURE — 99204 PR OFFICE/OUTPT VISIT, NEW, LEVL IV, 45-59 MIN: ICD-10-PCS | Mod: S$PBB,,, | Performed by: NURSE PRACTITIONER

## 2023-03-20 PROCEDURE — 3080F PR MOST RECENT DIASTOLIC BLOOD PRESSURE >= 90 MM HG: ICD-10-PCS | Mod: CPTII,,, | Performed by: NURSE PRACTITIONER

## 2023-03-20 PROCEDURE — 1159F PR MEDICATION LIST DOCUMENTED IN MEDICAL RECORD: ICD-10-PCS | Mod: CPTII,,, | Performed by: NURSE PRACTITIONER

## 2023-03-20 PROCEDURE — 3077F SYST BP >= 140 MM HG: CPT | Mod: CPTII,,, | Performed by: NURSE PRACTITIONER

## 2023-03-20 PROCEDURE — 3077F PR MOST RECENT SYSTOLIC BLOOD PRESSURE >= 140 MM HG: ICD-10-PCS | Mod: CPTII,,, | Performed by: NURSE PRACTITIONER

## 2023-03-20 PROCEDURE — 3008F BODY MASS INDEX DOCD: CPT | Mod: CPTII,,, | Performed by: NURSE PRACTITIONER

## 2023-03-20 PROCEDURE — 1160F PR REVIEW ALL MEDS BY PRESCRIBER/CLIN PHARMACIST DOCUMENTED: ICD-10-PCS | Mod: CPTII,,, | Performed by: NURSE PRACTITIONER

## 2023-03-20 PROCEDURE — 1159F MED LIST DOCD IN RCRD: CPT | Mod: CPTII,,, | Performed by: NURSE PRACTITIONER

## 2023-03-20 PROCEDURE — 99214 OFFICE O/P EST MOD 30 MIN: CPT | Mod: PBBFAC | Performed by: NURSE PRACTITIONER

## 2023-03-20 PROCEDURE — 1160F RVW MEDS BY RX/DR IN RCRD: CPT | Mod: CPTII,,, | Performed by: NURSE PRACTITIONER

## 2023-03-20 RX ORDER — ERGOCALCIFEROL 1.25 MG/1
50000 CAPSULE ORAL
Qty: 12 CAPSULE | Refills: 3 | Status: SHIPPED | OUTPATIENT
Start: 2023-03-20

## 2023-03-20 RX ORDER — ESOMEPRAZOLE MAGNESIUM 40 MG/1
40 CAPSULE, DELAYED RELEASE ORAL
Qty: 60 CAPSULE | Refills: 11 | Status: SHIPPED | OUTPATIENT
Start: 2023-03-20

## 2023-03-20 NOTE — PROGRESS NOTES
Patient Name: Claude Hall   : 1980  MRN: [id number]     SUBJECTIVE DATA:    CHIEF COMPLAINT:   Claude Hall is a 42 y.o. male who presents to clinic today with Establish Care and Medication Refill        HPI:  42-year-old male presents to the clinic to establish care.  Past medical history of ADHD, HIV, vitamin-D deficiency, anemia, GERD, gastroparesis with gastric pacemaker.    Medication refills:  Patient requesting refill on his Nexium 40 mg p.o. b.i.d., refill sent to preferred pharmacy.  Patient will follow-up with his gastroenterologist Dr. Rebecca Jackman for his Phenergan and Reglan refills.  Patient agreed and verbalized understanding.  Patient to read discharge education materials, questions solicited and answered, patient verbalized.    Regarding trazodone 50 mg p.o. nightly as needed, Adderall 20 mg p.o. b.i.d., Klonopin 1 mg p.o. once daily, patient to continue to follow up with psychiatric mental health nurse practitioner Lilia Schwartz and to get his medication refilled by his PCP.    Elevated blood pressure in the clinic:  Reviewed blood pressure readings from previous visits, readings were ranging between 177/114, 169/105, today is 147/96.  Patient was not aware of his blood pressure being elevated, instructed patient to follow low-salt diet less than 3 g a day, continue to monitor, patient to return in 1 week for blood pressure check, discussed with patient if continue to be elevated, will initiate blood pressure medication, patient verbalized and agreed to plan of care.    Drug use:  Instructed patient to stop smoking methamphetamine.  Discussed health risk.  Patient verbalized.    Care gaps:  Patient declined immunization at this time.    Patient denies chest pain, shortness of breath, dyspnea on exertion, palpitations, peripheral edema, abdominal pain, nausea, vomiting, diarrhea, constipation, fatigue, fever, chills, dysuria,  hematuria, melena, or hematochezia.    HPI        ALLERGIES:   Review of patient's allergies indicates:   Allergen Reactions    Lorazepam Other (See Comments) and Anxiety     Twitching, involuntary movements.   Other reaction(s): violent twitching, increased anxiety  Loose time, twitching, and he doesn't remember anything.      Adhesive          ROS:  Review of Systems   All other systems reviewed and are negative.      OBJECTIVE DATA:  Vital signs  Vitals:    03/20/23 1420 03/20/23 1500   BP: (!) 144/95 (!) 147/96   Pulse: 87    Resp: 18    Temp: 97.8 °F (36.6 °C)    TempSrc: Oral    SpO2: 96%    Weight: 62.1 kg (137 lb)    Height: 6' (1.829 m)       Body mass index is 18.58 kg/m².    PHYSICAL EXAM:   Physical Exam  Vitals and nursing note reviewed.   Constitutional:       General: He is awake. He is not in acute distress.     Appearance: Normal appearance. He is well-developed, well-groomed and normal weight. He is not ill-appearing, toxic-appearing or diaphoretic.   HENT:      Head: Normocephalic and atraumatic.      Right Ear: Tympanic membrane, ear canal and external ear normal. There is no impacted cerumen.      Left Ear: Tympanic membrane, ear canal and external ear normal. There is no impacted cerumen.      Nose: No congestion or rhinorrhea.      Right Nostril: No epistaxis.      Left Nostril: No epistaxis.      Right Turbinates: Not swollen.      Left Turbinates: Not swollen.      Mouth/Throat:      Lips: Pink.      Mouth: Mucous membranes are moist.      Pharynx: Oropharynx is clear. Uvula midline. No posterior oropharyngeal erythema.   Eyes:      General: Lids are normal.      Extraocular Movements: Extraocular movements intact.      Conjunctiva/sclera: Conjunctivae normal.      Pupils: Pupils are equal, round, and reactive to light.   Neck:      Trachea: Trachea and phonation normal.   Cardiovascular:      Rate and Rhythm: Normal rate and regular rhythm.      Pulses: Normal pulses.           Radial pulses are 2+ on the right side and 2+ on  the left side.        Dorsalis pedis pulses are 2+ on the right side and 2+ on the left side.      Heart sounds: Normal heart sounds. No murmur heard.  Pulmonary:      Effort: Pulmonary effort is normal.      Breath sounds: Normal breath sounds and air entry. No wheezing or rhonchi.   Abdominal:      General: Bowel sounds are normal.      Palpations: Abdomen is soft.      Tenderness: There is no abdominal tenderness. There is no right CVA tenderness or left CVA tenderness.   Musculoskeletal:         General: Normal range of motion.      Cervical back: Normal range of motion. No rigidity or tenderness.      Right lower leg: No edema.      Left lower leg: No edema.   Lymphadenopathy:      Cervical: No cervical adenopathy.   Skin:     General: Skin is warm.      Capillary Refill: Capillary refill takes less than 2 seconds.      Findings: No rash.   Neurological:      General: No focal deficit present.      Mental Status: He is alert and oriented to person, place, and time. Mental status is at baseline.      GCS: GCS eye subscore is 4. GCS verbal subscore is 5. GCS motor subscore is 6.      Cranial Nerves: Cranial nerves 2-12 are intact.      Sensory: Sensation is intact.      Motor: Motor function is intact.      Coordination: Coordination is intact.      Gait: Gait is intact. Gait normal.   Psychiatric:         Attention and Perception: Attention and perception normal.         Mood and Affect: Mood and affect normal.         Behavior: Behavior normal. Behavior is cooperative.         Thought Content: Thought content normal.         Cognition and Memory: Cognition normal.         Judgment: Judgment normal.        ASSESSMENT/PLAN:  1. Elevated blood-pressure reading without diagnosis of hypertension  Assessment & Plan:  instructed patient to follow low-salt diet less than 3 g a day, continue to monitor, patient to return in 1 week for blood pressure check, discussed with patient if continue to be elevated, will initiate  blood pressure medication, patient verbalized and agreed to plan of care.        2. Encounter to establish care  -     NEXIUM 40 mg capsule; Take 1 capsule (40 mg total) by mouth 2 (two) times daily before meals.  Dispense: 60 capsule; Refill: 11    3. Gastroesophageal reflux disease without esophagitis  Assessment & Plan:  Medications refilled.  Take medications as prescribed.  Keep follow-up appointment with Gastroenterology.  Patient to read discharge education material.    Orders:  -     NEXIUM 40 mg capsule; Take 1 capsule (40 mg total) by mouth 2 (two) times daily before meals.  Dispense: 60 capsule; Refill: 11  -     ergocalciferol (ERGOCALCIFEROL) 50,000 unit Cap; Take 1 capsule (50,000 Units total) by mouth every 7 days.  Dispense: 12 capsule; Refill: 3    4. Vitamin D deficiency  Assessment & Plan:  Take medications as prescribed.  Patient to read discharge education material.    Orders:  -     ergocalciferol (ERGOCALCIFEROL) 50,000 unit Cap; Take 1 capsule (50,000 Units total) by mouth every 7 days.  Dispense: 12 capsule; Refill: 3           RESULTS:  Recent Results (from the past 1008 hour(s))   Comprehensive metabolic panel    Collection Time: 02/27/23  7:38 AM   Result Value Ref Range    Sodium Level 141 136 - 145 mmol/L    Potassium Level 4.2 3.5 - 5.1 mmol/L    Chloride 103 98 - 107 mmol/L    Carbon Dioxide 29 22 - 29 mmol/L    Glucose Level 108 (H) 74 - 100 mg/dL    Blood Urea Nitrogen 14.2 8.9 - 20.6 mg/dL    Creatinine 1.15 0.73 - 1.18 mg/dL    Calcium Level Total 9.7 8.4 - 10.2 mg/dL    Protein Total 7.7 6.4 - 8.3 gm/dL    Albumin Level 4.3 3.5 - 5.0 g/dL    Globulin 3.4 2.4 - 3.5 gm/dL    Albumin/Globulin Ratio 1.3 1.1 - 2.0 ratio    Bilirubin Total 0.3 <=1.5 mg/dL    Alkaline Phosphatase 66 40 - 150 unit/L    Alanine Aminotransferase 13 0 - 55 unit/L    Aspartate Aminotransferase 14 5 - 34 unit/L    eGFR >60 mls/min/1.73/m2   CBC with Differential    Collection Time: 02/27/23  7:38 AM   Result  Value Ref Range    WBC 8.8 4.5 - 11.5 x10(3)/mcL    RBC 4.93 4.70 - 6.10 x10(6)/mcL    Hgb 13.2 (L) 14.0 - 18.0 g/dL    Hct 42.8 42.0 - 52.0 %    MCV 86.8 80.0 - 94.0 fL    MCH 26.8 pg    MCHC 30.8 (L) 33.0 - 36.0 g/dL    RDW 15.9 11.5 - 17.0 %    Platelet 291 130 - 400 x10(3)/mcL    MPV 9.5 7.4 - 10.4 fL    Neut % 64.6 %    Lymph % 25.1 %    Mono % 5.3 %    Eos % 4.0 %    Basophil % 0.8 %    Lymph # 2.21 0.6 - 4.6 x10(3)/mcL    Neut # 5.69 2.1 - 9.2 x10(3)/mcL    Mono # 0.47 0.1 - 1.3 x10(3)/mcL    Eos # 0.35 0 - 0.9 x10(3)/mcL    Baso # 0.07 0 - 0.2 x10(3)/mcL    IG# 0.02 0 - 0.04 x10(3)/mcL    IG% 0.2 %    NRBC% 0.0 %   Urinalysis, Reflex to Urine Culture    Collection Time: 02/27/23  7:46 AM    Specimen: Urine   Result Value Ref Range    Color, UA Light-Yellow Yellow, Light-Yellow, Dark Yellow, Melanie, Straw    Appearance, UA Clear Clear    Specific Gravity, UA 1.024     pH, UA 7.0 5.0 - 8.5    Protein, UA Negative Negative mg/dL    Glucose, UA Normal Negative, Normal mg/dL    Ketones, UA Negative Negative mg/dL    Blood, UA Negative Negative unit/L    Bilirubin, UA Negative Negative mg/dL    Urobilinogen, UA Normal 0.2, 1.0, Normal mg/dL    Nitrites, UA Negative Negative    Leukocyte Esterase, UA Negative Negative unit/L    WBC, UA 0-5 None Seen, 0-2, 3-5, 0-5 /HPF    Bacteria, UA None Seen None Seen /HPF    Squamous Epithelial Cells, UA Trace (A) None Seen /HPF    Mucous, UA Trace (A) None Seen /LPF    Hyaline Casts, UA None Seen None Seen /lpf    RBC, UA None Seen None Seen, 0-2, 3-5, 0-5 /HPF         Follow Up:  Follow up in about 4 months (around 7/20/2023).      Previous medical history/lab work/radiology reviewed and considered during medical management decisions.   Medication list reviewed and medication reconciliation performed.  Patient was provided  and care about his/her current diagnosis (es) and medications including risk/benefit and side effects/adverse events, over the counter medication  uses/doses, home self-care and contact precautions,  and red flags and indications for when to seek immediate medical attention.   Patient was advised to continue compliance with current medication list and medical recommendations.  Patient dvised continued compliance with recommended eating habits/ diets for medical conditions and exercise 150 minutes/ week (if possible) for medical condition (s).  Educational handouts and instructions on selected disease management in AVS (After Visit Summary).    All of the patient's questions were answered to patient's satisfaction.   The patient was receptive, expressed verbal understanding and agreement the above plan.       This note was created with the assistance of a voice recognition software or phone dictation. There may be transcription errors as a result of using this technology however minimal. Effort has been made to assure accuracy of transcription but any obvious errors or omissions should be clarified with the author of the document

## 2023-03-22 NOTE — ASSESSMENT & PLAN NOTE
Medications refilled.  Take medications as prescribed.  Keep follow-up appointment with Gastroenterology.  Patient to read discharge education material.

## 2023-03-22 NOTE — ASSESSMENT & PLAN NOTE
instructed patient to follow low-salt diet less than 3 g a day, continue to monitor, patient to return in 1 week for blood pressure check, discussed with patient if continue to be elevated, will initiate blood pressure medication, patient verbalized and agreed to plan of care.

## 2023-05-02 DIAGNOSIS — B20 HIV DISEASE: ICD-10-CM

## 2023-05-02 RX ORDER — EMTRICITABINE AND TENOFOVIR ALAFENAMIDE 200; 25 MG/1; MG/1
TABLET ORAL
Qty: 30 TABLET | Refills: 1 | Status: SHIPPED | OUTPATIENT
Start: 2023-05-02 | End: 2023-07-03

## 2023-07-02 DIAGNOSIS — B20 HIV DISEASE: ICD-10-CM

## 2023-07-03 RX ORDER — EMTRICITABINE AND TENOFOVIR ALAFENAMIDE 200; 25 MG/1; MG/1
TABLET ORAL
Qty: 30 TABLET | Refills: 1 | Status: SHIPPED | OUTPATIENT
Start: 2023-07-03 | End: 2023-07-18 | Stop reason: SDUPTHER

## 2023-07-05 ENCOUNTER — TELEPHONE (OUTPATIENT)
Dept: INFECTIOUS DISEASES | Facility: CLINIC | Age: 43
End: 2023-07-05
Payer: MEDICAID

## 2023-07-05 NOTE — LETTER
July 11, 2023    Claude Hall  [address]  [place] [address]             [facility]  [address] [address]  Phone: [phone] Dear Mr. Claude Hall:    We have been trying to contact you regarding your insurance.  If you have any questions or concerns, please don't hesitate to call. [phone].    Sincerely,    GABBIE Cheek APRN

## 2023-07-05 NOTE — TELEPHONE ENCOUNTER
----- Message from Jayne Marte sent at 7/3/2023  3:43 PM CDT -----  Regarding: Insurance  MAGALY PT         Called the pt to rs the appt. Pt stated that he does not have insurance right now and is not sure what to do. He stated that he has always had medicaid but received a bill. He also stated that he can get his medication cheaper.  Please call the pt @ [phone]

## 2023-07-05 NOTE — TELEPHONE ENCOUNTER
Phoned patient. No answer. Message left on voice mail to contact the clinic. To give patient access number of [phone], [phone] with return call.

## 2023-07-11 NOTE — TELEPHONE ENCOUNTER
Phoned patient. No answer. Message left on voice mail to contact clinic. Letter mailed to home address.

## 2023-07-18 ENCOUNTER — TELEPHONE (OUTPATIENT)
Dept: INFECTIOUS DISEASES | Facility: CLINIC | Age: 43
End: 2023-07-18

## 2023-07-18 ENCOUNTER — OFFICE VISIT (OUTPATIENT)
Dept: INFECTIOUS DISEASES | Facility: CLINIC | Age: 43
End: 2023-07-18
Payer: MEDICAID

## 2023-07-18 VITALS
BODY MASS INDEX: 20.25 KG/M2 | RESPIRATION RATE: 18 BRPM | HEIGHT: 71 IN | DIASTOLIC BLOOD PRESSURE: 83 MMHG | TEMPERATURE: 98 F | WEIGHT: 144.63 LBS | SYSTOLIC BLOOD PRESSURE: 123 MMHG | HEART RATE: 101 BPM

## 2023-07-18 DIAGNOSIS — Z11.3 ROUTINE SCREENING FOR STI (SEXUALLY TRANSMITTED INFECTION): ICD-10-CM

## 2023-07-18 DIAGNOSIS — Z86.19 HISTORY OF HEPATITIS C: ICD-10-CM

## 2023-07-18 DIAGNOSIS — K22.5 ZENKER DIVERTICULUM: ICD-10-CM

## 2023-07-18 DIAGNOSIS — B20 HIV DISEASE: Primary | ICD-10-CM

## 2023-07-18 DIAGNOSIS — A53.9 SYPHILIS (ACQUIRED): Primary | ICD-10-CM

## 2023-07-18 DIAGNOSIS — L30.9 DERMATITIS: ICD-10-CM

## 2023-07-18 LAB
ALBUMIN SERPL-MCNC: 3.5 G/DL (ref 3.5–5)
ALBUMIN/GLOB SERPL: 0.9 RATIO (ref 1.1–2)
ALP SERPL-CCNC: 80 UNIT/L (ref 40–150)
ALT SERPL-CCNC: 7 UNIT/L (ref 0–55)
AST SERPL-CCNC: 10 UNIT/L (ref 5–34)
BASOPHILS # BLD AUTO: 0.08 X10(3)/MCL
BASOPHILS NFR BLD AUTO: 1 %
BILIRUBIN DIRECT+TOT PNL SERPL-MCNC: 0.3 MG/DL
BUN SERPL-MCNC: 7 MG/DL (ref 8.9–20.6)
C TRACH DNA SPEC QL NAA+PROBE: NOT DETECTED
C TRACH DNA SPEC QL NAA+PROBE: NOT DETECTED
CALCIUM SERPL-MCNC: 9.2 MG/DL (ref 8.4–10.2)
CHLORIDE SERPL-SCNC: 104 MMOL/L (ref 98–107)
CO2 SERPL-SCNC: 27 MMOL/L (ref 22–29)
CREAT SERPL-MCNC: 0.84 MG/DL (ref 0.73–1.18)
EOSINOPHIL # BLD AUTO: 0.18 X10(3)/MCL (ref 0–0.9)
EOSINOPHIL NFR BLD AUTO: 2.3 %
ERYTHROCYTE [DISTWIDTH] IN BLOOD BY AUTOMATED COUNT: 16.3 % (ref 11.5–17)
GFR SERPLBLD CREATININE-BSD FMLA CKD-EPI: >60 MLS/MIN/1.73/M2
GLOBULIN SER-MCNC: 4.1 GM/DL (ref 2.4–3.5)
GLUCOSE SERPL-MCNC: 80 MG/DL (ref 74–100)
HCT VFR BLD AUTO: 36.4 % (ref 42–52)
HGB BLD-MCNC: 11.5 G/DL (ref 14–18)
IMM GRANULOCYTES # BLD AUTO: 0.03 X10(3)/MCL (ref 0–0.04)
IMM GRANULOCYTES NFR BLD AUTO: 0.4 %
LYMPHOCYTES # BLD AUTO: 1.84 X10(3)/MCL (ref 0.6–4.6)
LYMPHOCYTES NFR BLD AUTO: 23.9 %
MCH RBC QN AUTO: 25.3 PG (ref 27–31)
MCHC RBC AUTO-ENTMCNC: 31.6 G/DL (ref 33–36)
MCV RBC AUTO: 80.2 FL (ref 80–94)
MONOCYTES # BLD AUTO: 0.53 X10(3)/MCL (ref 0.1–1.3)
MONOCYTES NFR BLD AUTO: 6.9 %
N GONORRHOEA DNA SPEC QL NAA+PROBE: NOT DETECTED
N GONORRHOEA DNA SPEC QL NAA+PROBE: NOT DETECTED
NEUTROPHILS # BLD AUTO: 5.05 X10(3)/MCL (ref 2.1–9.2)
NEUTROPHILS NFR BLD AUTO: 65.5 %
NRBC BLD AUTO-RTO: 0 %
PLATELET # BLD AUTO: 399 X10(3)/MCL (ref 130–400)
PMV BLD AUTO: 9.4 FL (ref 7.4–10.4)
POTASSIUM SERPL-SCNC: 3.5 MMOL/L (ref 3.5–5.1)
PROT SERPL-MCNC: 7.6 GM/DL (ref 6.4–8.3)
RBC # BLD AUTO: 4.54 X10(6)/MCL (ref 4.7–6.1)
SODIUM SERPL-SCNC: 140 MMOL/L (ref 136–145)
SOURCE (OHS): NORMAL
SOURCE (OHS): NORMAL
T PALLIDUM AB SER QL: REACTIVE
WBC # SPEC AUTO: 7.71 X10(3)/MCL (ref 4.5–11.5)

## 2023-07-18 PROCEDURE — 3074F PR MOST RECENT SYSTOLIC BLOOD PRESSURE < 130 MM HG: ICD-10-PCS | Mod: CPTII,,, | Performed by: NURSE PRACTITIONER

## 2023-07-18 PROCEDURE — 99214 OFFICE O/P EST MOD 30 MIN: CPT | Mod: S$PBB,,, | Performed by: NURSE PRACTITIONER

## 2023-07-18 PROCEDURE — 3079F DIAST BP 80-89 MM HG: CPT | Mod: CPTII,,, | Performed by: NURSE PRACTITIONER

## 2023-07-18 PROCEDURE — 1159F PR MEDICATION LIST DOCUMENTED IN MEDICAL RECORD: ICD-10-PCS | Mod: CPTII,,, | Performed by: NURSE PRACTITIONER

## 2023-07-18 PROCEDURE — 85025 COMPLETE CBC W/AUTO DIFF WBC: CPT | Performed by: NURSE PRACTITIONER

## 2023-07-18 PROCEDURE — 1160F PR REVIEW ALL MEDS BY PRESCRIBER/CLIN PHARMACIST DOCUMENTED: ICD-10-PCS | Mod: CPTII,,, | Performed by: NURSE PRACTITIONER

## 2023-07-18 PROCEDURE — 87522 HEPATITIS C REVRS TRNSCRPJ: CPT | Performed by: NURSE PRACTITIONER

## 2023-07-18 PROCEDURE — 3074F SYST BP LT 130 MM HG: CPT | Mod: CPTII,,, | Performed by: NURSE PRACTITIONER

## 2023-07-18 PROCEDURE — 3044F PR MOST RECENT HEMOGLOBIN A1C LEVEL <7.0%: ICD-10-PCS | Mod: CPTII,,, | Performed by: NURSE PRACTITIONER

## 2023-07-18 PROCEDURE — 99214 OFFICE O/P EST MOD 30 MIN: CPT | Mod: PBBFAC | Performed by: NURSE PRACTITIONER

## 2023-07-18 PROCEDURE — 86592 SYPHILIS TEST NON-TREP QUAL: CPT | Performed by: NURSE PRACTITIONER

## 2023-07-18 PROCEDURE — 1159F MED LIST DOCD IN RCRD: CPT | Mod: CPTII,,, | Performed by: NURSE PRACTITIONER

## 2023-07-18 PROCEDURE — 3079F PR MOST RECENT DIASTOLIC BLOOD PRESSURE 80-89 MM HG: ICD-10-PCS | Mod: CPTII,,, | Performed by: NURSE PRACTITIONER

## 2023-07-18 PROCEDURE — 3044F HG A1C LEVEL LT 7.0%: CPT | Mod: CPTII,,, | Performed by: NURSE PRACTITIONER

## 2023-07-18 PROCEDURE — 36415 COLL VENOUS BLD VENIPUNCTURE: CPT | Performed by: NURSE PRACTITIONER

## 2023-07-18 PROCEDURE — 87591 N.GONORRHOEAE DNA AMP PROB: CPT | Mod: 91 | Performed by: NURSE PRACTITIONER

## 2023-07-18 PROCEDURE — 3008F BODY MASS INDEX DOCD: CPT | Mod: CPTII,,, | Performed by: NURSE PRACTITIONER

## 2023-07-18 PROCEDURE — 99214 PR OFFICE/OUTPT VISIT, EST, LEVL IV, 30-39 MIN: ICD-10-PCS | Mod: S$PBB,,, | Performed by: NURSE PRACTITIONER

## 2023-07-18 PROCEDURE — 3008F PR BODY MASS INDEX (BMI) DOCUMENTED: ICD-10-PCS | Mod: CPTII,,, | Performed by: NURSE PRACTITIONER

## 2023-07-18 PROCEDURE — 87536 HIV-1 QUANT&REVRSE TRNSCRPJ: CPT | Performed by: NURSE PRACTITIONER

## 2023-07-18 PROCEDURE — 86780 TREPONEMA PALLIDUM: CPT | Performed by: NURSE PRACTITIONER

## 2023-07-18 PROCEDURE — 87491 CHLMYD TRACH DNA AMP PROBE: CPT | Performed by: NURSE PRACTITIONER

## 2023-07-18 PROCEDURE — 1160F RVW MEDS BY RX/DR IN RCRD: CPT | Mod: CPTII,,, | Performed by: NURSE PRACTITIONER

## 2023-07-18 PROCEDURE — 80053 COMPREHEN METABOLIC PANEL: CPT | Performed by: NURSE PRACTITIONER

## 2023-07-18 RX ORDER — DORAVIRINE 100 MG/1
100 TABLET, FILM COATED ORAL DAILY
Qty: 30 TABLET | Refills: 4 | Status: SHIPPED | OUTPATIENT
Start: 2023-07-18 | End: 2023-12-08 | Stop reason: SDUPTHER

## 2023-07-18 RX ORDER — DOXYCYCLINE HYCLATE 100 MG
100 TABLET ORAL 2 TIMES DAILY
Qty: 28 TABLET | Refills: 0 | Status: SHIPPED | OUTPATIENT
Start: 2023-07-18 | End: 2023-08-01

## 2023-07-18 RX ORDER — EMTRICITABINE AND TENOFOVIR ALAFENAMIDE 200; 25 MG/1; MG/1
1 TABLET ORAL DAILY
Qty: 30 TABLET | Refills: 4 | Status: SHIPPED | OUTPATIENT
Start: 2023-07-18 | End: 2023-11-06

## 2023-07-18 RX ORDER — LISDEXAMFETAMINE DIMESYLATE 40 MG/1
40 CAPSULE ORAL DAILY
COMMUNITY

## 2023-07-18 NOTE — PROGRESS NOTES
DJ,   Your syphilis ab is newly reactive, indicating that the rash is syphilis infection. Titer will result tomorrow, but that will not alter the recommended treatment plan.  Due to a national shortage of Bicillin, we will have to treat with Doxycycline 100 mg twice daily for 14 days.  Prescription has been sent to your preferred pharmacy.  Partner(s) need to seek treatment as well. Thank you.   Mary Ann

## 2023-07-18 NOTE — TELEPHONE ENCOUNTER
----- Message from Rocio Billy sent at 7/18/2023 11:46 AM CDT -----  Results are not ready, spoke with Ashely at [facility] urgent care in [place], [place]    If you have any further questions, [phone]    Thank You!

## 2023-07-18 NOTE — PROGRESS NOTES
Subjective     Patient ID: Claude Hall is a 43 y.o. male.    Chief Complaint: Followup HIV (States really thinks + syphilis, showed lesions, did have testing, no results yet)    7/18/23  RAUL is a 42 yo WM presenting today for HIV f/u visit and evaluation of rash.  He is taking Descovy & Pifeltro daily, tolerates well. Labs 2/7/23 VL UD, Cd4 934, RPR NR, HCV not detected. 3 point CT/GC screenings negative  12/22.  He tells me that he began to feel feverish 7/13/23 evening with a rash to the palms of his hands, torso, and mouth.  Rash does not itch.  Recently switched medication from Adderall to Vyvanse, but no other medication changes.  Last sexual encounters 3/2023. Protected anal intercourse with partner, and unprotected oral intercourse with another partner.  He tells me that he has been abstaining since this time period.  He went to an urgent care in [place] 7/14/23, states blood was drawn for suspected syphilis but he has not received results or treatment. Will request same & collect labs today as well. He tells me that he is also having trouble with food getting stuck in Zenker's diverticulum, and causes him to choke sometimes.  Appreciates return visit to GI clinic for re-evaluation. Order placed.     2/7/23  RAUL is a 41 yo WM here today for f/u visit HIV/HCV.  He is taking Descovy & Pifeltro daily as prescribed.  Tolerates well.  Labs 10/6/22 VL UD, CD4 818.  He completed 8 weeks of Mavyret on 10/6/22, HCV not detected 12/9/22.  Will collect HCV RNA today for SVR 12  assessment.  Rash resolved.  Immunizations are UTD.  He tells me that he is having some GI upset with N/V, will f/u with MD that manages his gastric pacer.  All questions answered & concerns addressed.     12/9/22  RAUL is a 41 yo WM presenting today for evaluation of skin lesions that have been developing over the past couple of weeks.  The single lesions present as an ingrown hair, he tells me that he picks at it, the lesion begins to heal  and a new lesion develops.  He has various lesions over his thighs, glutes, and forearms.  He tells me that he has not been sexually active since early Sept 2022 and has not used any meth for a couple of months either.  No active drainage noted from any of the lesions, no pain to touch, minimal surrounding erythema to lesions, various stages of healing.        Review of Systems   Constitutional: Negative.    HENT: Negative.     Respiratory:  Positive for choking.    Cardiovascular: Negative.    Gastrointestinal: Negative.    Genitourinary: Negative.    Integumentary:  Positive for rash.   Neurological: Negative.    Hematological: Negative.    Psychiatric/Behavioral: Negative.          Objective     Physical Exam  Vitals reviewed.   Constitutional:       General: He is not in acute distress.     Appearance: Normal appearance. He is not toxic-appearing.   HENT:      Mouth/Throat:      Mouth: Mucous membranes are moist.      Pharynx: Oropharynx is clear.   Eyes:      Conjunctiva/sclera: Conjunctivae normal.   Cardiovascular:      Rate and Rhythm: Normal rate and regular rhythm.   Pulmonary:      Effort: Pulmonary effort is normal. No respiratory distress.      Breath sounds: Normal breath sounds.   Abdominal:      General: Abdomen is flat. Bowel sounds are normal.      Palpations: Abdomen is soft.   Musculoskeletal:         General: Normal range of motion.      Cervical back: Normal range of motion.   Lymphadenopathy:      Cervical: No cervical adenopathy.   Skin:     General: Skin is warm and dry.      Findings: Rash present.      Comments: Flat, annular, hyperpigmented distinct rash to palms of hands & torso.    Neurological:      General: No focal deficit present.      Mental Status: He is alert and oriented to person, place, and time. Mental status is at baseline.   Psychiatric:         Mood and Affect: Mood normal.         Behavior: Behavior normal.          Assessment and Plan     1. HIV disease  -     doravirine  (PIFELTRO) 100 mg Tab; Take 1 tablet (100 mg total) by mouth Daily.  Dispense: 30 tablet; Refill: 4  -     emtricitabine-tenofovir alafen (DESCOVY) 200-25 mg Tab; Take 1 tablet by mouth once daily.  Dispense: 30 tablet; Refill: 4  -     HIV-1 RNA, Quantitative, PCR with Reflex to Genotype; Future; Expected date: 07/18/2023  -     SYPHILIS ANTIBODY (WITH REFLEX RPR); Future; Expected date: 07/18/2023  -     CBC Auto Differential; Future; Expected date: 07/18/2023  -     Comprehensive Metabolic Panel  Adherence and sexual health counseling done.  Use condoms for all sexual encounters.  Blood precautions.   Continue Descovy & Pifeltro as directed.  Repeat labs today.  RTC 3 month with tia Reese.     HIV Wellness:  Anal pap: HRA with Biopsy 1/5/22  Oral CT/GC: 12/22 Neg, 7/23  Anal CT/GC: 12/22 Neg, 7/23  Urine CT/GC: 12/22 Neg, 7/23  RPR: 2/23 Neg ab, 7/23  Ophth:     2. Zenker diverticulum  -     Ambulatory referral/consult to Gastroenterology; Future; Expected date: 07/25/2023  Refer to GI for re-evaluation.    3. Routine screening for STI (sexually transmitted infection)  -     C.trach/N.gonor AMP RNA; Future; Expected date: 07/18/2023  -     Chlamydia/GC, PCR  -     Chlamydia/GC, PCR  -     SYPHILIS ANTIBODY (WITH REFLEX RPR); Future; Expected date: 07/18/2023  3 point CT/GC screening, RPR today.  Requesting records from [facility].    4. Dermatitis  -     SYPHILIS ANTIBODY (WITH REFLEX RPR); Future; Expected date: 07/18/2023  Suspect for syphilis.  Results pending, will call pt with results & treatment plan today.     5. History of hepatitis C  -     Hepatitis C Virus Quantitative; Future; Expected date: 07/18/2023  HX: Treatment naive.  GT 1a, baseline VL [id number]  Fibrosure 6/28/22 A 0, F 0  FibroScan: not a candidate due to implanted gastric pacer  Not a candidate for Epclusa due to DDI with Nexium which is medically necessary   Completed Mavyret 3 tabs daily x 8 weeks on 10/6/22, started  8/10/22  HCV not detected 9/7/22, week 4 and 10/6/22 end of treatment  Blood & sex precautions: do not share a razor, needle, toothbrush, or clippers with anyone.  Partner tested HCV negative.  HCV VL not detected 2/7/23 for SVR 12 documentation.  Repeat HCV RNA today.

## 2023-07-19 ENCOUNTER — TELEPHONE (OUTPATIENT)
Dept: INFECTIOUS DISEASES | Facility: CLINIC | Age: 43
End: 2023-07-19
Payer: MEDICAID

## 2023-07-19 LAB
RPR SER QL: REACTIVE
RPR SER-TITR: ABNORMAL {TITER}

## 2023-07-19 NOTE — TELEPHONE ENCOUNTER
----- Message from ANABELLA Spear sent at 7/18/2023  4:14 PM CDT -----  DJ,   Your syphilis ab is newly reactive, indicating that the rash is syphilis infection. Titer will result tomorrow, but that will not alter the recommended treatment plan.  Due to a national shortage of Bicillin, we will have to treat with Doxycycline 100 mg twice daily for 14 days.  Prescription has been sent to your preferred pharmacy.  Partner(s) need to seek treatment as well. Thank you.   Mary Ann

## 2023-07-19 NOTE — TELEPHONE ENCOUNTER
Patient contacted the clinic. Discussed Your syphilis ab is newly reactive, indicating that the rash is syphilis infection. Titer will result tomorrow, but that will not alter the recommended treatment plan.  Due to a national shortage of Bicillin, we will have to treat with Doxycycline 100 mg twice daily for 14 days.  Prescription has been sent to your preferred pharmacy.  Partner(s) need to seek treatment as well. Voiced understanding and appreciates call.

## 2023-07-20 LAB
C TRACH RRNA SPEC QL NAA+PROBE: NEGATIVE
HCV RNA SERPL NAA+PROBE-ACNC: NORMAL IU/ML
HIV1 RNA # PLAS NAA DL=20: NORMAL COPIES/ML
N GONORRHOEA RRNA SPEC QL NAA+PROBE: NEGATIVE
SPECIMEN SOURCE: NORMAL
SPECIMEN SOURCE: NORMAL

## 2023-07-29 DIAGNOSIS — A53.9 SYPHILIS (ACQUIRED): ICD-10-CM

## 2023-08-02 RX ORDER — DOXYCYCLINE HYCLATE 100 MG
TABLET ORAL
Qty: 28 TABLET | Refills: 0 | OUTPATIENT
Start: 2023-08-02

## 2023-08-16 LAB
INR PPP: 1
PROTHROMBIN TIME: 13.2 SECONDS (ref 11.4–14)

## 2023-09-18 ENCOUNTER — TELEPHONE (OUTPATIENT)
Dept: INFECTIOUS DISEASES | Facility: CLINIC | Age: 43
End: 2023-09-18
Payer: MEDICAID

## 2023-09-18 NOTE — TELEPHONE ENCOUNTER
----- Message from Екатерина Clement sent at 9/18/2023 11:59 AM CDT -----  Regarding: refill  Pt for Mary Ann  Pt needs refill on Descovy plz sent to [facility]  [phone]

## 2023-09-18 NOTE — TELEPHONE ENCOUNTER
Phoned patient. Now requests rx to be sent to [facility]. Previous pharmacy [facility] in [place]. Informed patient to call [facility] and request profile to be transferred from [facility] at [facility]. Voiced understanding and appreciates call.

## 2023-09-19 ENCOUNTER — TELEPHONE (OUTPATIENT)
Dept: INFECTIOUS DISEASES | Facility: CLINIC | Age: 43
End: 2023-09-19
Payer: MEDICAID

## 2023-09-19 NOTE — TELEPHONE ENCOUNTER
Patient contacted the clinic. Requests to change pharmacy in chart to [facility]. Pharmacy changed as requested. Appreciates the change.

## 2023-11-04 DIAGNOSIS — B20 HIV DISEASE: ICD-10-CM

## 2023-11-06 RX ORDER — EMTRICITABINE AND TENOFOVIR ALAFENAMIDE 200; 25 MG/1; MG/1
1 TABLET ORAL
Qty: 30 TABLET | Refills: 0 | Status: SHIPPED | OUTPATIENT
Start: 2023-11-06 | End: 2023-12-26 | Stop reason: SDUPTHER

## 2023-11-14 ENCOUNTER — DOCUMENTATION ONLY (OUTPATIENT)
Dept: INFECTIOUS DISEASES | Facility: CLINIC | Age: 43
End: 2023-11-14
Payer: MEDICAID

## 2023-11-14 NOTE — PROGRESS NOTES
Phoned patient. No answer. Message left on voice mail regarding virtual appt and completing e-precheck in.

## 2023-12-08 ENCOUNTER — TELEPHONE (OUTPATIENT)
Dept: INFECTIOUS DISEASES | Facility: CLINIC | Age: 43
End: 2023-12-08
Payer: MEDICAID

## 2023-12-08 DIAGNOSIS — B20 HIV DISEASE: ICD-10-CM

## 2023-12-08 RX ORDER — DORAVIRINE 100 MG/1
100 TABLET, FILM COATED ORAL DAILY
Qty: 30 TABLET | Refills: 1 | Status: SHIPPED | OUTPATIENT
Start: 2023-12-08 | End: 2023-12-26 | Stop reason: SDUPTHER

## 2023-12-08 NOTE — TELEPHONE ENCOUNTER
Rx sent to pharmacy electronically per provider, Mary Ann Umanzor NP. Patient notified of rx sent.

## 2023-12-08 NOTE — TELEPHONE ENCOUNTER
Last visit with Mary Ann Umanzor, APRN: 7/18/2023  Last visit in [facility] INFECTIOUS DISEASE: 7/18/2023    Patient's next visit in [facility] INFECTIOUS DISEASE: 12/26/2023     LL 07/18/2023    Please advise on medication refill   Inflammation Suggestive Of Cancer Camouflage Histology Text: There was a dense lymphocytic infiltrate which prevented adequate histologic evaluation of adjacent structures.

## 2023-12-26 ENCOUNTER — OFFICE VISIT (OUTPATIENT)
Dept: INFECTIOUS DISEASES | Facility: CLINIC | Age: 43
End: 2023-12-26
Payer: MEDICAID

## 2023-12-26 DIAGNOSIS — Z23 NEED FOR VACCINATION: Primary | ICD-10-CM

## 2023-12-26 DIAGNOSIS — Z86.19 HISTORY OF SYPHILIS: ICD-10-CM

## 2023-12-26 DIAGNOSIS — Z86.19 HISTORY OF HEPATITIS C: ICD-10-CM

## 2023-12-26 DIAGNOSIS — Z23 NEED FOR VACCINATION: ICD-10-CM

## 2023-12-26 DIAGNOSIS — N50.819 PAIN IN TESTICLE, UNSPECIFIED LATERALITY: ICD-10-CM

## 2023-12-26 DIAGNOSIS — B20 HIV DISEASE: Primary | ICD-10-CM

## 2023-12-26 PROCEDURE — 3044F PR MOST RECENT HEMOGLOBIN A1C LEVEL <7.0%: ICD-10-PCS | Mod: CPTII,95,, | Performed by: NURSE PRACTITIONER

## 2023-12-26 PROCEDURE — 1159F MED LIST DOCD IN RCRD: CPT | Mod: CPTII,95,, | Performed by: NURSE PRACTITIONER

## 2023-12-26 PROCEDURE — 99214 PR OFFICE/OUTPT VISIT, EST, LEVL IV, 30-39 MIN: ICD-10-PCS | Mod: 95,,, | Performed by: NURSE PRACTITIONER

## 2023-12-26 PROCEDURE — 1160F PR REVIEW ALL MEDS BY PRESCRIBER/CLIN PHARMACIST DOCUMENTED: ICD-10-PCS | Mod: CPTII,95,, | Performed by: NURSE PRACTITIONER

## 2023-12-26 PROCEDURE — 99214 OFFICE O/P EST MOD 30 MIN: CPT | Mod: 95,,, | Performed by: NURSE PRACTITIONER

## 2023-12-26 PROCEDURE — 1160F RVW MEDS BY RX/DR IN RCRD: CPT | Mod: CPTII,95,, | Performed by: NURSE PRACTITIONER

## 2023-12-26 PROCEDURE — 3044F HG A1C LEVEL LT 7.0%: CPT | Mod: CPTII,95,, | Performed by: NURSE PRACTITIONER

## 2023-12-26 PROCEDURE — 1159F PR MEDICATION LIST DOCUMENTED IN MEDICAL RECORD: ICD-10-PCS | Mod: CPTII,95,, | Performed by: NURSE PRACTITIONER

## 2023-12-26 RX ORDER — DORAVIRINE 100 MG/1
100 TABLET, FILM COATED ORAL DAILY
Qty: 30 TABLET | Refills: 4 | Status: SHIPPED | OUTPATIENT
Start: 2023-12-26 | End: 2024-03-26 | Stop reason: SDUPTHER

## 2023-12-26 RX ORDER — EMTRICITABINE AND TENOFOVIR ALAFENAMIDE 200; 25 MG/1; MG/1
1 TABLET ORAL DAILY
Qty: 30 TABLET | Refills: 4 | Status: SHIPPED | OUTPATIENT
Start: 2023-12-26 | End: 2024-03-26 | Stop reason: SDUPTHER

## 2023-12-26 NOTE — PROGRESS NOTES
Patient ID: Claude Hall 43 y.o.     Chief Complaint:   Chief Complaint   Patient presents with    Followup HIV     States having pain today      Patient and provider are located in the state of [place].    Face to Face time with patient:  30 minutes of total time spent on the encounter, which includes face to face time and non-face to face time preparing to see the patient (eg, review of tests), Obtaining and/or reviewing separately obtained history, Documenting clinical information in the electronic or other health record, Independently interpreting results (not separately reported) and communicating results to the patient/family/caregiver, or Care coordination (not separately reported).     Each patient to whom he or she provides medical services by telemedicine is:  (1) informed of the relationship between the physician and patient and the respective role of any other health care provider with respect to management of the patient; and (2) notified that he or she may decline to receive medical services by telemedicine and may withdraw from such care at any time.    HPI:    12/26/23  RAUL is a 42 yo WM evaluated today via audio-video virtual visit for HIV f/u. He tells me that he was out of Descovy & Pifeltro for about 3 weeks due to insurance concern, recently resumed 12/8/23 once issue was cleared. He tolerates it well, labs 7/18/23 HIV UD, HCV UD.  RPR titer 1:128. Completed the 14 days of doxycycline as prescribed & partner(s) were notified to seek treatment as well. Will repeat titer with labs. Will come later this week for labs & flu vaccine as recommended. Orders placed. Last eye exam 8/2023, wears correction. Today he is experiencing testicular pain that recurs every few months for the past several years. He has presented to ED on multiple occasions and had scrotal u/s done.  He states that he has been told that he has a hydrocele in the past, not evident on most recent ultrasounds. Appreciates  referral to urology for expert evaluation. Order placed. All questions answered & concerns addressed.    7/18/23  RAUL is a 42 yo WM presenting today for HIV f/u visit and evaluation of rash.  He is taking Descovy & Pifeltro daily, tolerates well. Labs 2/7/23 VL UD, Cd4 934, RPR NR, HCV not detected. 3 point CT/GC screenings negative  12/22.  He tells me that he began to feel feverish 7/13/23 evening with a rash to the palms of his hands, torso, and mouth.  Rash does not itch.  Recently switched medication from Adderall to Vyvanse, but no other medication changes.  Last sexual encounters 3/2023. Protected anal intercourse with partner, and unprotected oral intercourse with another partner.  He tells me that he has been abstaining since this time period.  He went to an urgent care in [place] 7/14/23, states blood was drawn for suspected syphilis but he has not received results or treatment. Will request same & collect labs today as well. He tells me that he is also having trouble with food getting stuck in Zenker's diverticulum, and causes him to choke sometimes.  Appreciates return visit to GI clinic for re-evaluation. Order placed.      2/7/23  RAUL is a 41 yo WM here today for f/u visit HIV/HCV.  He is taking Descovy & Pifeltro daily as prescribed.  Tolerates well.  Labs 10/6/22 VL UD, CD4 818.  He completed 8 weeks of Mavyret on 10/6/22, HCV not detected 12/9/22.  Will collect HCV RNA today for SVR 12  assessment.  Rash resolved.  Immunizations are UTD.  He tells me that he is having some GI upset with N/V, will f/u with MD that manages his gastric pacer.  All questions answered & concerns addressed.           Past Medical History:   Diagnosis Date    ADHD (attention deficit hyperactivity disorder)     Anemia     Gastroparesis     HIV (human immunodeficiency virus infection)         Past Surgical History:   Procedure Laterality Date    FRACTURE SURGERY  1997    Left arm broken    gastric pacemaker          Social  History     Socioeconomic History    Marital status: Single   Tobacco Use    Smoking status: Light Smoker     Current packs/day: 0.25     Average packs/day: 0.3 packs/day for 20.0 years (5.0 ttl pk-yrs)     Types: Cigarettes    Smokeless tobacco: Never   Substance and Sexual Activity    Alcohol use: Not Currently    Drug use: Not Currently     Types: Methamphetamines    Sexual activity: Yes     Partners: Male     Comment: Partner on Prep, not since last visit        Family History   Problem Relation Age of Onset    COPD Mother     Diabetes Mother     No Known Problems Father         Review of patient's allergies indicates:   Allergen Reactions    Lorazepam Other (See Comments) and Anxiety     Twitching, involuntary movements.   Other reaction(s): violent twitching, increased anxiety  Loose time, twitching, and he doesn't remember anything.      Adhesive         Immunization History   Administered Date(s) Administered    COVID-19 Vaccine 08/16/2021    COVID-19, MRNA, LN-S, PF (Pfizer) (Purple Cap) 03/27/2021, 04/17/2021    HPV 9-Valent 04/08/2019, 07/08/2019, 11/11/2019    Hepatitis A 07/25/2011    Hepatitis A, Adult 08/01/2012    Hepatitis B, Adult 04/01/2011    Influenza 12/11/2013    Influenza - Quadrivalent 10/20/2016, 11/18/2021    Influenza - Quadrivalent - PF (6-35 months) 10/30/2017, 11/15/2018    Influenza - Quadrivalent - PF *Preferred* (6 months and older) 10/13/2010, 10/30/2017, 11/11/2019, 09/26/2020, 10/06/2022    Influenza - Trivalent - PF (ADULT) 10/13/2010, 12/01/2013, 10/13/2014, 12/23/2015, 10/20/2016    Meningococcal Conjugate (MCV4P) 11/15/2018, 04/08/2019    Pneumococcal Conjugate - 13 Valent 10/13/2014    Pneumococcal Conjugate - 7 Valent 02/25/2008    Pneumococcal Polysaccharide - 23 Valent 03/01/2013, 02/27/2018    Td (ADULT) 07/21/2014    Td (Adult), Unspecified Formulation 07/21/2014        Review of Systems   Constitutional: Negative.    HENT: Negative.     Eyes: Negative.    Respiratory:  Negative.     Cardiovascular: Negative.    Gastrointestinal: Negative.    Genitourinary: Negative.         Testicular pain   Musculoskeletal: Negative.    Skin: Negative.    Neurological: Negative.    Endo/Heme/Allergies: Negative.    Psychiatric/Behavioral: Negative.     All other systems reviewed and are negative.         Objective:      There were no vitals taken for this visit.     Physical Exam  Constitutional:       General: He is not in acute distress.     Appearance: Normal appearance.   HENT:      Head: Normocephalic.   Eyes:      Conjunctiva/sclera: Conjunctivae normal.   Pulmonary:      Effort: Pulmonary effort is normal.   Musculoskeletal:         General: Normal range of motion.      Cervical back: Normal range of motion.   Neurological:      General: No focal deficit present.      Mental Status: He is alert and oriented to person, place, and time. Mental status is at baseline.   Psychiatric:         Mood and Affect: Mood normal.         Behavior: Behavior normal.         Thought Content: Thought content normal.         Judgment: Judgment normal.          Labs:   Lab Results   Component Value Date    WBC 7.71 07/18/2023    HGB 11.5 (L) 07/18/2023    HCT 36.4 (L) 07/18/2023    MCV 80.2 07/18/2023     07/18/2023       CMP  Sodium   Date Value Ref Range Status   03/23/2017 142 136 - 145 mmol/L Final     Sodium Level   Date Value Ref Range Status   07/18/2023 140 136 - 145 mmol/L Final     Potassium   Date Value Ref Range Status   03/23/2017 3.7 3.5 - 5.1 mmol/L Final     Potassium Level   Date Value Ref Range Status   07/18/2023 3.5 3.5 - 5.1 mmol/L Final     Chloride   Date Value Ref Range Status   03/23/2017 102 95 - 110 mmol/L Final     CO2   Date Value Ref Range Status   03/23/2017 25 23 - 29 mmol/L Final     Carbon Dioxide   Date Value Ref Range Status   07/18/2023 27 22 - 29 mmol/L Final     Glucose   Date Value Ref Range Status   03/23/2017 113 (H) 70 - 110 mg/dL Final     BUN   Date Value  Ref Range Status   03/23/2017 12 6 - 20 mg/dL Final     Blood Urea Nitrogen   Date Value Ref Range Status   07/18/2023 7.0 (L) 8.9 - 20.6 mg/dL Final     Creatinine   Date Value Ref Range Status   07/18/2023 0.84 0.73 - 1.18 mg/dL Final   03/23/2017 1.0 0.5 - 1.4 mg/dL Final     Calcium   Date Value Ref Range Status   03/23/2017 10.0 8.7 - 10.5 mg/dL Final     Calcium Level Total   Date Value Ref Range Status   07/18/2023 9.2 8.4 - 10.2 mg/dL Final     Total Protein   Date Value Ref Range Status   03/23/2017 7.9 6.0 - 8.4 g/dL Final     Albumin   Date Value Ref Range Status   03/23/2017 4.4 3.5 - 5.2 g/dL Final     Albumin Level   Date Value Ref Range Status   07/18/2023 3.5 3.5 - 5.0 g/dL Final     Total Bilirubin   Date Value Ref Range Status   03/23/2017 0.4 0.1 - 1.0 mg/dL Final     Comment:     For infants and newborns, interpretation of results should be based  on gestational age, weight and in agreement with clinical  observations.  Premature Infant recommended reference ranges:  Up to 24 hours.............<8.0 mg/dL  Up to 48 hours............<12.0 mg/dL  3-5 days..................<15.0 mg/dL  6-29 days.................<15.0 mg/dL       Bilirubin Total   Date Value Ref Range Status   07/18/2023 0.3 <=1.5 mg/dL Final     Alkaline Phosphatase   Date Value Ref Range Status   07/18/2023 80 40 - 150 unit/L Final   03/23/2017 79 55 - 135 U/L Final     AST   Date Value Ref Range Status   03/23/2017 19 10 - 40 U/L Final     Aspartate Aminotransferase   Date Value Ref Range Status   07/18/2023 10 5 - 34 unit/L Final     ALT   Date Value Ref Range Status   03/23/2017 19 10 - 44 U/L Final     Alanine Aminotransferase   Date Value Ref Range Status   07/18/2023 7 0 - 55 unit/L Final     Anion Gap   Date Value Ref Range Status   03/23/2017 15 8 - 16 mmol/L Final     eGFR   Date Value Ref Range Status   07/18/2023 >60 mls/min/1.73/m2 Final     Lab Results   Component Value Date    TSH 0.218 (L) 02/07/2023     Hep C Ab  Interp   Date Value Ref Range Status   11/18/2021 Reactive (A) >Nonreactive Final     Syphilis Antibody   Date Value Ref Range Status   07/18/2023 Reactive (A) Nonreactive, Equivocal Final     RPR   Date Value Ref Range Status   07/18/2023 Reactive (A) Non-Reactive Final     RPR Titer   Date Value Ref Range Status   07/18/2023 128 dils (A) (none) Final     Cholesterol Total   Date Value Ref Range Status   02/07/2023 225 (H) <=200 mg/dL Final     HDL Cholesterol   Date Value Ref Range Status   02/07/2023 60 35 - 60 mg/dL Final     Triglyceride   Date Value Ref Range Status   02/07/2023 225 (H) 34 - 140 mg/dL Final     Cholesterol/HDL Ratio   Date Value Ref Range Status   02/07/2023 4 0 - 5 Final     Very Low Density Lipoprotein   Date Value Ref Range Status   02/07/2023 45  Final     LDL Cholesterol   Date Value Ref Range Status   02/07/2023 120.00 50.00 - 140.00 mg/dL Final     Vit D 25 OH   Date Value Ref Range Status   02/07/2023 42.9 30.0 - 80.0 ng/mL Final     Results for orders placed or performed in visit on 10/06/22   CD4 Lymphocytes   Result Value Ref Range    Patient Age 42     WBC Absolute 7,300 4,500 - 11,500 /mm3    Lymph Percent 32.2 28 - 48 %    Lymph Absolute 2,350.6 1,260 - 5,520 x10(3)/mcL    CD4 % 34.8 %    CD4 Absolute 818.0088 589 - 1,505 unit/L    T Cell Interp       Normal total lymphocyte absolute count and normal percentage.  Normal CD4+ T helper cell absolute count and normal percentage.    Glendy Mike MD       Results for orders placed or performed in visit on 07/18/23   HIV-1 RNA, Quantitative, PCR with Reflex to Genotype   Result Value Ref Range    HIV-1 RNA Detect/Quant, P Undetected Undetected copies/mL     Results for orders placed or performed in visit on 02/07/23   Quantiferon Gold TB   Result Value Ref Range    QuantiFERON-Tb Gold Plus Result Negative Negative    TB1 Ag minus Nil Result 0.02 IU/mL    TB2 Ag minus Nil Result 0.05 IU/mL    Mitogen minus Nil Result >10.00 IU/mL     Nil Result 0.01 IU/mL     Results for orders placed or performed in visit on 07/18/23   C.trach/N.gonor AMP RNA   Result Value Ref Range    Chlamydia trachomatis amplified RNA Negative Negative    Neisseria gonorrhoeae amplified RNA Negative Negative    Source SEE COMMENTS     SOURCE: SEE COMMENTS      Results for orders placed or performed in visit on 02/07/23   Urinalysis   Result Value Ref Range    Color, UA Yellow Yellow, Light-Yellow, Dark Yellow, Melanie, Straw    Appearance, UA Clear Clear    Specific Gravity, UA 1.034     pH, UA 6.0 5.0 - 8.5    Protein, UA Trace (A) Negative mg/dL    Glucose, UA Normal Negative, Normal mg/dL    Ketones, UA Trace (A) Negative mg/dL    Blood, UA Negative Negative unit/L    Bilirubin, UA Negative Negative mg/dL    Urobilinogen, UA 1+ (A) 0.2, 1.0, Normal mg/dL    Nitrites, UA Negative Negative    Leukocyte Esterase, UA Negative Negative unit/L    WBC, UA 0-5 None Seen, 0-2, 3-5, 0-5 /HPF    Bacteria, UA None Seen None Seen /HPF    Squamous Epithelial Cells, UA Trace (A) None Seen /HPF    Mucous, UA Moderate (A) None Seen /LPF    Hyaline Casts, UA None Seen None Seen /lpf    RBC, UA 0-5 None Seen, 0-2, 3-5, 0-5 /HPF       Imaging: Reviewed most recent relevant imaging studies available, notable results highlighted in this note    Medications:     Current Outpatient Medications   Medication Instructions    clonazePAM (KLONOPIN) 1 mg, Oral, Daily    emtricitabine-tenofovir alafen (DESCOVY) 200-25 mg Tab 1 tablet, Oral, Daily    ergocalciferol (ERGOCALCIFEROL) 50,000 Units, Oral, Every 7 days    ferrous sulfate 325 mg, Oral, Daily    lisdexamfetamine (VYVANSE) 40 mg, Oral, Daily    metoclopramide HCl (REGLAN) 10 mg, Oral, 2 times daily PRN    NexIUM 40 mg, Oral, 2 times daily before meals    PIFELTRO 100 mg, Oral, Daily    promethazine (PHENERGAN) 6.25 mg/5 mL syrup 5 mLs, Oral, Every 4 hours PRN    traZODone (DESYREL) 50 mg, Oral, Nightly       Assessment:       Problem List  Items Addressed This Visit    None  Visit Diagnoses       HIV disease    -  Primary    Relevant Medications    doravirine (PIFELTRO) 100 mg Tab    emtricitabine-tenofovir alafen (DESCOVY) 200-25 mg Tab    Other Relevant Orders    Comprehensive Metabolic Panel    CBC Auto Differential    CD4 Lymphocytes    HIV-1 RNA, Quantitative, PCR with Reflex to Genotype    History of syphilis        Relevant Orders    SYPHILIS ANTIBODY (WITH REFLEX RPR)    History of hepatitis C        Pain in testicle, unspecified laterality        Relevant Orders    Ambulatory referral/consult to Urology    Need for vaccination                   Plan:      HIV disease  -     doravirine (PIFELTRO) 100 mg Tab; Take 1 tablet (100 mg total) by mouth Daily.  Dispense: 30 tablet; Refill: 4  -     emtricitabine-tenofovir alafen (DESCOVY) 200-25 mg Tab; Take 1 tablet by mouth once daily.  Dispense: 30 tablet; Refill: 4  -     Comprehensive Metabolic Panel  -     CBC Auto Differential; Future; Expected date: 12/26/2023  -     CD4 Lymphocytes; Future; Expected date: 12/26/2023  -     HIV-1 RNA, Quantitative, PCR with Reflex to Genotype; Future; Expected date: 12/26/2023  Adherence and sexual health counseling done.  Use condoms for all sexual encounters.  Blood precautions.   Continue Descovy & Pifeltro as directed.  Repeat labs this week.  RTC 3 months with tia Reese.     HIV Wellness:  Anal pap: HRA with Biopsy 1/5/22  Oral CT/GC: 12/22 Neg, 7/23 Neg  Anal CT/GC: 12/22 Neg, 7/23 Neg  Urine CT/GC: 12/22 Neg, 7/23 Neg  RPR: 2/23 Neg ab, 7/23 128 dils, 12/23  Ophth: 8/2023    History of syphilis  -     SYPHILIS ANTIBODY (WITH REFLEX RPR); Future; Expected date: 12/26/2023  Baseline titer 128 dils 7/2023  Treated with Doxycycline 100 mg bid x 14 days due to national shortage of Bicillin at that time.   Repeat titer with labs this week.     History of hepatitis C  HX: Treatment naive.  GT 1a, baseline VL [id number]  Fibrosure 6/28/22 A 0, F 0  FibroScan:  not a candidate due to implanted gastric pacer  Not a candidate for Epclusa due to DDI with Nexium which is medically necessary   Completed Mavyret 3 tabs daily x 8 weeks on 10/6/22, started 8/10/22  HCV not detected 9/7/22, week 4 and 10/6/22 end of treatment  Blood & sex precautions: do not share a razor, needle, toothbrush, or clippers with anyone.  Partner tested HCV negative.  HCV VL not detected 2/7/23, 7/2023.    Pain in testicle, unspecified laterality  -     Ambulatory referral/consult to Urology; Future; Expected date: 01/02/2024  Recurrent every few months for past several years.   Scrotal u/s benign.   Refer to urology for further evaluation.    Need for vaccination  Flu vaccine this week, same day as labs.  Order placed.

## 2024-02-09 ENCOUNTER — OFFICE VISIT (OUTPATIENT)
Dept: UROLOGY | Facility: CLINIC | Age: 44
End: 2024-02-09
Payer: MEDICAID

## 2024-02-09 VITALS
HEART RATE: 92 BPM | WEIGHT: 143 LBS | TEMPERATURE: 98 F | HEIGHT: 71 IN | OXYGEN SATURATION: 99 % | DIASTOLIC BLOOD PRESSURE: 85 MMHG | BODY MASS INDEX: 20.02 KG/M2 | SYSTOLIC BLOOD PRESSURE: 126 MMHG | RESPIRATION RATE: 20 BRPM

## 2024-02-09 DIAGNOSIS — Q55.22 RETRACTILE TESTIS: ICD-10-CM

## 2024-02-09 DIAGNOSIS — N50.812 PAIN IN LEFT TESTICLE: Primary | ICD-10-CM

## 2024-02-09 PROCEDURE — 3079F DIAST BP 80-89 MM HG: CPT | Mod: CPTII,,, | Performed by: UROLOGY

## 2024-02-09 PROCEDURE — 99204 OFFICE O/P NEW MOD 45 MIN: CPT | Mod: S$PBB,,, | Performed by: UROLOGY

## 2024-02-09 PROCEDURE — 3008F BODY MASS INDEX DOCD: CPT | Mod: CPTII,,, | Performed by: UROLOGY

## 2024-02-09 PROCEDURE — 99215 OFFICE O/P EST HI 40 MIN: CPT | Mod: PBBFAC | Performed by: UROLOGY

## 2024-02-09 PROCEDURE — 3074F SYST BP LT 130 MM HG: CPT | Mod: CPTII,,, | Performed by: UROLOGY

## 2024-02-09 PROCEDURE — 1159F MED LIST DOCD IN RCRD: CPT | Mod: CPTII,,, | Performed by: UROLOGY

## 2024-02-09 NOTE — PROGRESS NOTES
CC:  Scrotal pain    HPI:  Claude Hall is a 43 y.o. male being seen in consultation for scrotal pain.   Approximately two to three years ago he had epididymitis on left.  After that he notices that the left testicle will go up into the groin.  This is very painful.  It is intermittent but may occur weekly.  He does maneuvers such as milking it down or soaking in hot water to try to get testicle down and this is usually successful.  He has had multiple scrotal ultrasounds.  Dates and results are below.  Has no right-sided complaints.    Urinalysis:  He was unable to give a urine sample today.    Imaging:  Scrotal ultrasound - 27 February 2023:   Nonspecific scrotal wall thickening, otherwise normal    Scrotal ultrasound - 31 May 2022:  Nonspecific scrotal wall thickening, otherwise normal    CT - 31 May 2022:  Negative      Data Review:  Note from referring provider, ANABELLA Alfaro dated 26 December 2023.  Scrotal ultrasound.  CT.     ROS:  All systems reviewed and are negative except as documented in HPI and/or Assessment and Plan.     Patient Active Problem List:     Patient Active Problem List   Diagnosis    Gastroparesis    Pharyngoesophageal dysphagia    Zenker's diverticulum    Tobacco user    Elevated blood-pressure reading without diagnosis of hypertension    Encounter to establish care    Vitamin D deficiency    Gastroesophageal reflux disease without esophagitis        Past Medical History:  Past Medical History:   Diagnosis Date    ADHD (attention deficit hyperactivity disorder)     Anemia     Gastroparesis     HIV (human immunodeficiency virus infection)         Past Surgical History:  Past Surgical History:   Procedure Laterality Date    FRACTURE SURGERY  1997    Left arm broken    gastric pacemaker      HEMORRHOID SURGERY      MEDIPORT INSERTION, SINGLE Left         Family History:  Family History   Problem Relation Age of Onset    COPD Mother     Diabetes Mother     No Known Problems  Father         Social History:  Social History     Socioeconomic History    Marital status: Single   Tobacco Use    Smoking status: Light Smoker     Current packs/day: 0.25     Average packs/day: 0.3 packs/day for 20.0 years (5.0 ttl pk-yrs)     Types: Cigarettes    Smokeless tobacco: Never   Substance and Sexual Activity    Alcohol use: Not Currently    Drug use: Not Currently     Types: Methamphetamines    Sexual activity: Yes     Partners: Male     Comment: Partner on Prep, not since last visit        Allergies:  Review of patient's allergies indicates:   Allergen Reactions    Lorazepam Other (See Comments) and Anxiety     Twitching, involuntary movements.   Other reaction(s): violent twitching, increased anxiety  Loose time, twitching, and he doesn't remember anything.      Adhesive         Objective:  Vitals:    02/09/24 1454   BP: 126/85   Pulse: 92   Resp: 20   Temp: 97.7 °F (36.5 °C)     General:  Well developed, well nourished adult male in no acute distress  Abdomen: Soft, nontender, no masses  Extremities:  No clubbing, cyanosis, or edema  Neurologic:  Grossly intact  Musculoskeletal:  Normal tone  Penis:  Circumcised, no lesions  Scrotum:  No hydrocele.  Scrotal skin is thickened but there were no other  Testicles:  Nontender, no masses  Epididymis:  Normal without masses    Assessment:  1. Pain in left testicle  - Ambulatory referral/consult to Urology    2. Retractile testis     Plan:  1 and 2.  I explained the mechanism by which the testicle gets retracted up into the groin.  I offered testicular fixation.  He may want to do this but he is currently in school and wants to wait until he is through with this semester.  He will return after that and we will make a decision on what he wants to do.    Follow-up:  Mid May 2024.

## 2024-03-26 ENCOUNTER — OFFICE VISIT (OUTPATIENT)
Dept: INFECTIOUS DISEASES | Facility: CLINIC | Age: 44
End: 2024-03-26
Payer: MEDICAID

## 2024-03-26 DIAGNOSIS — B20 HIV DISEASE: Primary | ICD-10-CM

## 2024-03-26 DIAGNOSIS — Z86.19 HISTORY OF SYPHILIS: ICD-10-CM

## 2024-03-26 PROCEDURE — 1160F RVW MEDS BY RX/DR IN RCRD: CPT | Mod: CPTII,95,, | Performed by: NURSE PRACTITIONER

## 2024-03-26 PROCEDURE — 99214 OFFICE O/P EST MOD 30 MIN: CPT | Mod: 95,,, | Performed by: NURSE PRACTITIONER

## 2024-03-26 PROCEDURE — 1159F MED LIST DOCD IN RCRD: CPT | Mod: CPTII,95,, | Performed by: NURSE PRACTITIONER

## 2024-03-26 RX ORDER — DORAVIRINE 100 MG/1
100 TABLET, FILM COATED ORAL DAILY
Qty: 30 TABLET | Refills: 4 | Status: SHIPPED | OUTPATIENT
Start: 2024-03-26 | End: 2024-05-28 | Stop reason: SDUPTHER

## 2024-03-26 RX ORDER — EMTRICITABINE AND TENOFOVIR ALAFENAMIDE 200; 25 MG/1; MG/1
1 TABLET ORAL DAILY
Qty: 30 TABLET | Refills: 4 | Status: SHIPPED | OUTPATIENT
Start: 2024-03-26 | End: 2024-05-28 | Stop reason: SDUPTHER

## 2024-03-26 NOTE — PROGRESS NOTES
Patient ID: Claude Hall 43 y.o.     Chief Complaint:   Chief Complaint   Patient presents with    Followup HIV     Denies problems   Patient and provider are located in the state of [place].    Face to Face time with patient:  30 minutes of total time spent on the encounter, which includes face to face time and non-face to face time preparing to see the patient (eg, review of tests), Obtaining and/or reviewing separately obtained history, Documenting clinical information in the electronic or other health record, Independently interpreting results (not separately reported) and communicating results to the patient/family/caregiver, or Care coordination (not separately reported).     Each patient to whom he or she provides medical services by telemedicine is:  (1) informed of the relationship between the physician and patient and the respective role of any other health care provider with respect to management of the patient; and (2) notified that he or she may decline to receive medical services by telemedicine and may withdraw from such care at any time.    HPI:    3/26/24  RAUL is a 44 yo WM evaluated today via audio-video virtual visit for f/u. He states that he did resume Descovy & Pifeltro after last visit, but ran out again about 2 weeks ago. He is having trouble with getting medications filled as disability was reversed and Medicare insurance was not paid. He is covered by Medicaid, but they will not cover medications because their records states that he does have Medicare. Disability has been reinstated but Medicare still not covering medications per pharmacy. He has been working diligently with disability office regarding this and is [place] today regarding same. Will come in tomorrow for labs, states that he must have forgotten to come in for labs after last visit. Will reassess RPR titer as well, denies any new partners. He did attend visit with Dr. Jade and is planning surgery early summer to  have testicle tacked. Stressed importance of having medications resumed asap to get back to viral suppression preoperatively. Voiced understanding & appreciation.     12/26/23  RAUL is a 44 yo WM evaluated today via audio-video virtual visit for HIV f/u. He tells me that he was out of Descovy & Pifeltro for about 3 weeks due to insurance concern, recently resumed 12/8/23 once issue was cleared. He tolerates it well, labs 7/18/23 HIV UD, HCV UD.  RPR titer 1:128. Completed the 14 days of doxycycline as prescribed & partner(s) were notified to seek treatment as well. Will repeat titer with labs. Will come later this week for labs & flu vaccine as recommended. Orders placed. Last eye exam 8/2023, wears correction. Today he is experiencing testicular pain that recurs every few months for the past several years. He has presented to ED on multiple occasions and had scrotal u/s done.  He states that he has been told that he has a hydrocele in the past, not evident on most recent ultrasounds. Appreciates referral to urology for expert evaluation. Order placed. All questions answered & concerns addressed.     7/18/23  RAUL is a 44 yo WM presenting today for HIV f/u visit and evaluation of rash.  He is taking Descovy & Pifeltro daily, tolerates well. Labs 2/7/23 VL UD, Cd4 934, RPR NR, HCV not detected. 3 point CT/GC screenings negative  12/22.  He tells me that he began to feel feverish 7/13/23 evening with a rash to the palms of his hands, torso, and mouth.  Rash does not itch.  Recently switched medication from Adderall to Vyvanse, but no other medication changes.  Last sexual encounters 3/2023. Protected anal intercourse with partner, and unprotected oral intercourse with another partner.  He tells me that he has been abstaining since this time period.  He went to an urgent care in [place] 7/14/23, states blood was drawn for suspected syphilis but he has not received results or treatment. Will request same & collect labs  today as well. He tells me that he is also having trouble with food getting stuck in Zenker's diverticulum, and causes him to choke sometimes.  Appreciates return visit to GI clinic for re-evaluation. Order placed.            Past Medical History:   Diagnosis Date    ADHD (attention deficit hyperactivity disorder)     Anemia     Gastroparesis     HIV (human immunodeficiency virus infection)         Past Surgical History:   Procedure Laterality Date    FRACTURE SURGERY  1997    Left arm broken    gastric pacemaker      HEMORRHOID SURGERY      MEDIPORT INSERTION, SINGLE Left         Social History     Socioeconomic History    Marital status: Single   Tobacco Use    Smoking status: Light Smoker     Current packs/day: 0.25     Average packs/day: 0.3 packs/day for 56.2 years (14.1 ttl pk-yrs)     Types: Cigarettes     Start date: 1988    Smokeless tobacco: Never   Substance and Sexual Activity    Alcohol use: Not Currently    Drug use: Not Currently     Types: Methamphetamines    Sexual activity: Yes     Partners: Male     Birth control/protection: None     Comment: Partner on Prep, not since last visit        Family History   Problem Relation Age of Onset    COPD Mother     Diabetes Mother     No Known Problems Father         Review of patient's allergies indicates:   Allergen Reactions    Lorazepam Other (See Comments) and Anxiety     Twitching, involuntary movements.   Other reaction(s): violent twitching, increased anxiety  Loose time, twitching, and he doesn't remember anything.      Adhesive         Immunization History   Administered Date(s) Administered    COVID-19 Vaccine 08/16/2021    COVID-19, MRNA, LN-S, PF (Pfizer) (Purple Cap) 03/27/2021, 04/17/2021    HPV 9-Valent 04/08/2019, 07/08/2019, 11/11/2019    Hepatitis A 07/25/2011    Hepatitis A, Adult 08/01/2012    Hepatitis B, Adult 04/01/2011    Influenza 12/11/2013    Influenza - Quadrivalent 10/20/2016, 11/18/2021    Influenza - Quadrivalent - PF (6-35  months) 10/30/2017, 11/15/2018    Influenza - Quadrivalent - PF *Preferred* (6 months and older) 10/13/2010, 10/30/2017, 11/11/2019, 09/26/2020, 10/06/2022    Influenza - Trivalent (ADULT) 10/13/2010    Influenza - Trivalent - PF (ADULT) 10/13/2010, 12/01/2013, 10/13/2014, 12/23/2015, 10/20/2016, 10/30/2017    Meningococcal Conjugate (MCV4P) 11/15/2018, 04/08/2019    Pneumococcal Conjugate - 13 Valent 10/13/2014    Pneumococcal Conjugate - 7 Valent 02/25/2008    Pneumococcal Polysaccharide - 23 Valent 03/01/2013, 02/27/2018    Td (ADULT) 07/21/2014    Td (Adult), Unspecified Formulation 07/21/2014        Review of Systems   Constitutional: Negative.    HENT: Negative.     Eyes: Negative.    Respiratory: Negative.     Cardiovascular: Negative.    Gastrointestinal: Negative.    Genitourinary: Negative.    Musculoskeletal: Negative.    Skin: Negative.    Neurological: Negative.    Endo/Heme/Allergies: Negative.    Psychiatric/Behavioral: Negative.     All other systems reviewed and are negative.         Objective:      There were no vitals taken for this visit.     Physical Exam  Constitutional:       General: He is not in acute distress.     Appearance: Normal appearance.   HENT:      Head: Normocephalic.   Eyes:      Conjunctiva/sclera: Conjunctivae normal.   Pulmonary:      Effort: Pulmonary effort is normal.   Musculoskeletal:         General: Normal range of motion.      Cervical back: Normal range of motion.   Neurological:      General: No focal deficit present.      Mental Status: He is alert and oriented to person, place, and time. Mental status is at baseline.   Psychiatric:         Mood and Affect: Mood normal.         Behavior: Behavior normal.         Thought Content: Thought content normal.         Judgment: Judgment normal.          Labs:   Lab Results   Component Value Date    WBC 7.71 07/18/2023    HGB 11.5 (L) 07/18/2023    HCT 36.4 (L) 07/18/2023    MCV 80.2 07/18/2023     07/18/2023        CMP  Sodium   Date Value Ref Range Status   03/23/2017 142 136 - 145 mmol/L Final     Sodium Level   Date Value Ref Range Status   07/18/2023 140 136 - 145 mmol/L Final     Potassium   Date Value Ref Range Status   03/23/2017 3.7 3.5 - 5.1 mmol/L Final     Potassium Level   Date Value Ref Range Status   07/18/2023 3.5 3.5 - 5.1 mmol/L Final     Chloride   Date Value Ref Range Status   03/23/2017 102 95 - 110 mmol/L Final     CO2   Date Value Ref Range Status   03/23/2017 25 23 - 29 mmol/L Final     Carbon Dioxide   Date Value Ref Range Status   07/18/2023 27 22 - 29 mmol/L Final     Glucose   Date Value Ref Range Status   03/23/2017 113 (H) 70 - 110 mg/dL Final     BUN   Date Value Ref Range Status   03/23/2017 12 6 - 20 mg/dL Final     Blood Urea Nitrogen   Date Value Ref Range Status   07/18/2023 7.0 (L) 8.9 - 20.6 mg/dL Final     Creatinine   Date Value Ref Range Status   07/18/2023 0.84 0.73 - 1.18 mg/dL Final   03/23/2017 1.0 0.5 - 1.4 mg/dL Final     Calcium   Date Value Ref Range Status   03/23/2017 10.0 8.7 - 10.5 mg/dL Final     Calcium Level Total   Date Value Ref Range Status   07/18/2023 9.2 8.4 - 10.2 mg/dL Final     Total Protein   Date Value Ref Range Status   03/23/2017 7.9 6.0 - 8.4 g/dL Final     Albumin   Date Value Ref Range Status   03/23/2017 4.4 3.5 - 5.2 g/dL Final     Albumin Level   Date Value Ref Range Status   07/18/2023 3.5 3.5 - 5.0 g/dL Final     Total Bilirubin   Date Value Ref Range Status   03/23/2017 0.4 0.1 - 1.0 mg/dL Final     Comment:     For infants and newborns, interpretation of results should be based  on gestational age, weight and in agreement with clinical  observations.  Premature Infant recommended reference ranges:  Up to 24 hours.............<8.0 mg/dL  Up to 48 hours............<12.0 mg/dL  3-5 days..................<15.0 mg/dL  6-29 days.................<15.0 mg/dL       Bilirubin Total   Date Value Ref Range Status   07/18/2023 0.3 <=1.5 mg/dL Final      Alkaline Phosphatase   Date Value Ref Range Status   07/18/2023 80 40 - 150 unit/L Final   03/23/2017 79 55 - 135 U/L Final     AST   Date Value Ref Range Status   03/23/2017 19 10 - 40 U/L Final     Aspartate Aminotransferase   Date Value Ref Range Status   07/18/2023 10 5 - 34 unit/L Final     ALT   Date Value Ref Range Status   03/23/2017 19 10 - 44 U/L Final     Alanine Aminotransferase   Date Value Ref Range Status   07/18/2023 7 0 - 55 unit/L Final     Anion Gap   Date Value Ref Range Status   03/23/2017 15 8 - 16 mmol/L Final     eGFR   Date Value Ref Range Status   07/18/2023 >60 mls/min/1.73/m2 Final     Lab Results   Component Value Date    TSH 0.218 (L) 02/07/2023     Hep C Ab Interp   Date Value Ref Range Status   11/18/2021 Reactive (A) >Nonreactive Final     Syphilis Antibody   Date Value Ref Range Status   07/18/2023 Reactive (A) Nonreactive, Equivocal Final     RPR   Date Value Ref Range Status   07/18/2023 Reactive (A) Non-Reactive Final     RPR Titer   Date Value Ref Range Status   07/18/2023 128 dils (A) (none) Final     Cholesterol Total   Date Value Ref Range Status   02/07/2023 225 (H) <=200 mg/dL Final     HDL Cholesterol   Date Value Ref Range Status   02/07/2023 60 35 - 60 mg/dL Final     Triglyceride   Date Value Ref Range Status   02/07/2023 225 (H) 34 - 140 mg/dL Final     Cholesterol/HDL Ratio   Date Value Ref Range Status   02/07/2023 4 0 - 5 Final     Very Low Density Lipoprotein   Date Value Ref Range Status   02/07/2023 45  Final     LDL Cholesterol   Date Value Ref Range Status   02/07/2023 120.00 50.00 - 140.00 mg/dL Final     Vit D 25 OH   Date Value Ref Range Status   02/07/2023 42.9 30.0 - 80.0 ng/mL Final     Results for orders placed or performed in visit on 10/06/22   CD4 Lymphocytes   Result Value Ref Range    Patient Age 42     WBC Absolute 7,300 4,500 - 11,500 /mm3    Lymph Percent 32.2 28 - 48 %    Lymph Absolute 2,350.6 1,260 - 5,520 x10(3)/mcL    CD4 % 34.8 %    CD4  Absolute 818.0088 589 - 1,505 unit/L    T Cell Interp       Normal total lymphocyte absolute count and normal percentage.  Normal CD4+ T helper cell absolute count and normal percentage.    Glendy Mike MD       Results for orders placed or performed in visit on 07/18/23   HIV-1 RNA, Quantitative, PCR with Reflex to Genotype   Result Value Ref Range    HIV-1 RNA Detect/Quant, P Undetected Undetected copies/mL     Results for orders placed or performed in visit on 02/07/23   Quantiferon Gold TB   Result Value Ref Range    QuantiFERON-Tb Gold Plus Result Negative Negative    TB1 Ag minus Nil Result 0.02 IU/mL    TB2 Ag minus Nil Result 0.05 IU/mL    Mitogen minus Nil Result >10.00 IU/mL    Nil Result 0.01 IU/mL     Results for orders placed or performed in visit on 07/18/23   C.trach/N.gonor AMP RNA   Result Value Ref Range    Chlamydia trachomatis amplified RNA Negative Negative    Neisseria gonorrhoeae amplified RNA Negative Negative    Source SEE COMMENTS     SOURCE: SEE COMMENTS      Results for orders placed or performed in visit on 02/07/23   Urinalysis   Result Value Ref Range    Color, UA Yellow Yellow, Light-Yellow, Dark Yellow, Melanie, Straw    Appearance, UA Clear Clear    Specific Gravity, UA 1.034     pH, UA 6.0 5.0 - 8.5    Protein, UA Trace (A) Negative mg/dL    Glucose, UA Normal Negative, Normal mg/dL    Ketones, UA Trace (A) Negative mg/dL    Blood, UA Negative Negative unit/L    Bilirubin, UA Negative Negative mg/dL    Urobilinogen, UA 1+ (A) 0.2, 1.0, Normal mg/dL    Nitrites, UA Negative Negative    Leukocyte Esterase, UA Negative Negative unit/L    WBC, UA 0-5 None Seen, 0-2, 3-5, 0-5 /HPF    Bacteria, UA None Seen None Seen /HPF    Squamous Epithelial Cells, UA Trace (A) None Seen /HPF    Mucous, UA Moderate (A) None Seen /LPF    Hyaline Casts, UA None Seen None Seen /lpf    RBC, UA 0-5 None Seen, 0-2, 3-5, 0-5 /HPF       Imaging: Reviewed most recent relevant imaging studies available,  notable results highlighted in this note    Medications:     Current Outpatient Medications   Medication Instructions    clonazePAM (KLONOPIN) 1 mg, Oral, Daily    emtricitabine-tenofovir alafen (DESCOVY) 200-25 mg Tab 1 tablet, Oral, Daily    ergocalciferol (ERGOCALCIFEROL) 50,000 Units, Oral, Every 7 days    ferrous sulfate 325 mg, Oral, Daily    lisdexamfetamine (VYVANSE) 40 mg, Oral, Daily    metoclopramide HCl (REGLAN) 10 mg, Oral, 2 times daily PRN    NexIUM 40 mg, Oral, 2 times daily before meals    PIFELTRO 100 mg, Oral, Daily    promethazine (PHENERGAN) 6.25 mg/5 mL syrup 5 mLs, Oral, Every 4 hours PRN    traZODone (DESYREL) 50 mg, Oral, Nightly       Assessment:       Problem List Items Addressed This Visit    None  Visit Diagnoses       HIV disease    -  Primary    Relevant Medications    emtricitabine-tenofovir alafen (DESCOVY) 200-25 mg Tab    doravirine (PIFELTRO) 100 mg Tab    Other Relevant Orders    CBC Auto Differential    CD4 Lymphocytes    Comprehensive Metabolic Panel    HIV-1 RNA, Quantitative, PCR with Reflex to Genotype    Quantiferon Gold TB    History of syphilis        Relevant Orders    SYPHILIS ANTIBODY (WITH REFLEX RPR)               Plan:      HIV disease  -     Cancel: HIV-1 RNA, Quantitative, PCR with Reflex to Genotype; Future; Expected date: 03/26/2024  -     Cancel: Comprehensive Metabolic Panel  -     Cancel: CD4 T-[place] Cells; Future; Expected date: 03/26/2024  -     Cancel: CBC Auto Differential; Future; Expected date: 03/26/2024  -     emtricitabine-tenofovir alafen (DESCOVY) 200-25 mg Tab; Take 1 tablet by mouth once daily.  Dispense: 30 tablet; Refill: 4  -     doravirine (PIFELTRO) 100 mg Tab; Take 1 tablet (100 mg total) by mouth Daily.  Dispense: 30 tablet; Refill: 4  -     Cancel: Quantiferon Gold TB; Future; Expected date: 03/26/2024  -     CBC Auto Differential; Future; Expected date: 03/26/2024  -     CD4 Lymphocytes; Future; Expected date: 03/26/2024  -      Comprehensive Metabolic Panel; Future; Expected date: 03/26/2024  -     HIV-1 RNA, Quantitative, PCR with Reflex to Genotype; Future; Expected date: 03/26/2024  -     Quantiferon Gold TB; Future; Expected date: 06/26/2024  Adherence and sexual health counseling done.  Use condoms for all sexual encounters.  Blood precautions.   Resume Descovy & Pifeltro asap.  Repeat labs this week.  RTC 6 weeks with tia Reese.     HIV Wellness:  Anal pap: HRA with Biopsy 1/5/22  Oral CT/GC: 12/22 Neg, 7/23 Neg  Anal CT/GC: 12/22 Neg, 7/23 Neg  Urine CT/GC: 12/22 Neg, 7/23 Neg  RPR: 2/23 Neg ab, 7/23 128 dils  Ophth: 8/2023    History of syphilis  -     Cancel: SYPHILIS ANTIBODY (WITH REFLEX RPR); Future; Expected date: 03/26/2024  -     SYPHILIS ANTIBODY (WITH REFLEX RPR); Future; Expected date: 03/26/2024  Baseline titer 128 dils 7/2023  Treated with Doxycycline 100 mg bid x 14 days due to national shortage of Bicillin at that time.   Repeat titer with labs this week.     History of hepatitis C  HX: Treatment naive.  GT 1a, baseline VL [id number]  Fibrosure 6/28/22 A 0, F 0  FibroScan: not a candidate due to implanted gastric pacer  Not a candidate for Epclusa due to DDI with Nexium which is medically necessary   Completed Mavyret 3 tabs daily x 8 weeks on 10/6/22, started 8/10/22  HCV not detected 9/7/22, week 4 and 10/6/22 end of treatment  Blood & sex precautions: do not share a razor, needle, toothbrush, or clippers with anyone.  Partner tested HCV negative.  HCV VL not detected 2/7/23, 7/2023.

## 2024-03-28 ENCOUNTER — LAB VISIT (OUTPATIENT)
Dept: LAB | Facility: HOSPITAL | Age: 44
End: 2024-03-28
Attending: NURSE PRACTITIONER
Payer: MEDICARE

## 2024-03-28 DIAGNOSIS — B20 HIV DISEASE: ICD-10-CM

## 2024-03-28 DIAGNOSIS — Z86.19 HISTORY OF SYPHILIS: ICD-10-CM

## 2024-03-28 LAB
ALBUMIN SERPL-MCNC: 3.9 G/DL (ref 3.5–5)
ALBUMIN/GLOB SERPL: 1.1 RATIO (ref 1.1–2)
ALP SERPL-CCNC: 87 UNIT/L (ref 40–150)
ALT SERPL-CCNC: 16 UNIT/L (ref 0–55)
AST SERPL-CCNC: 15 UNIT/L (ref 5–34)
BASOPHILS # BLD AUTO: 0.08 X10(3)/MCL
BASOPHILS NFR BLD AUTO: 1 %
BILIRUB SERPL-MCNC: 0.4 MG/DL
BUN SERPL-MCNC: 11.5 MG/DL (ref 8.9–20.6)
CALCIUM SERPL-MCNC: 9.9 MG/DL (ref 8.4–10.2)
CHLORIDE SERPL-SCNC: 105 MMOL/L (ref 98–107)
CO2 SERPL-SCNC: 28 MMOL/L (ref 22–29)
CREAT SERPL-MCNC: 1.25 MG/DL (ref 0.73–1.18)
EOSINOPHIL # BLD AUTO: 0.25 X10(3)/MCL (ref 0–0.9)
EOSINOPHIL NFR BLD AUTO: 3 %
ERYTHROCYTE [DISTWIDTH] IN BLOOD BY AUTOMATED COUNT: 15.9 % (ref 11.5–17)
GFR SERPLBLD CREATININE-BSD FMLA CKD-EPI: >60 MLS/MIN/1.73/M2
GLOBULIN SER-MCNC: 3.6 GM/DL (ref 2.4–3.5)
GLUCOSE SERPL-MCNC: 117 MG/DL (ref 74–100)
HCT VFR BLD AUTO: 41.6 % (ref 42–52)
HGB BLD-MCNC: 12.9 G/DL (ref 14–18)
IMM GRANULOCYTES # BLD AUTO: 0.02 X10(3)/MCL (ref 0–0.04)
IMM GRANULOCYTES NFR BLD AUTO: 0.2 %
LYMPHOCYTES # BLD AUTO: 3.13 X10(3)/MCL (ref 0.6–4.6)
LYMPHOCYTES NFR BLD AUTO: 37.8 %
MCH RBC QN AUTO: 26.7 PG (ref 27–31)
MCHC RBC AUTO-ENTMCNC: 31 G/DL (ref 33–36)
MCV RBC AUTO: 86 FL (ref 80–94)
MONOCYTES # BLD AUTO: 0.65 X10(3)/MCL (ref 0.1–1.3)
MONOCYTES NFR BLD AUTO: 7.8 %
NEUTROPHILS # BLD AUTO: 4.16 X10(3)/MCL (ref 2.1–9.2)
NEUTROPHILS NFR BLD AUTO: 50.2 %
NRBC BLD AUTO-RTO: 0 %
PLATELET # BLD AUTO: 288 X10(3)/MCL (ref 130–400)
PMV BLD AUTO: 9.1 FL (ref 7.4–10.4)
POTASSIUM SERPL-SCNC: 4.1 MMOL/L (ref 3.5–5.1)
PROT SERPL-MCNC: 7.5 GM/DL (ref 6.4–8.3)
RBC # BLD AUTO: 4.84 X10(6)/MCL (ref 4.7–6.1)
SODIUM SERPL-SCNC: 142 MMOL/L (ref 136–145)
T PALLIDUM AB SER QL: REACTIVE
WBC # SPEC AUTO: 8.29 X10(3)/MCL (ref 4.5–11.5)

## 2024-03-28 PROCEDURE — 86780 TREPONEMA PALLIDUM: CPT

## 2024-03-28 PROCEDURE — 86360 T CELL ABSOLUTE COUNT/RATIO: CPT

## 2024-03-28 PROCEDURE — 85025 COMPLETE CBC W/AUTO DIFF WBC: CPT

## 2024-03-28 PROCEDURE — 36415 COLL VENOUS BLD VENIPUNCTURE: CPT

## 2024-03-28 PROCEDURE — 80053 COMPREHEN METABOLIC PANEL: CPT

## 2024-03-28 PROCEDURE — 87536 HIV-1 QUANT&REVRSE TRNSCRPJ: CPT

## 2024-03-28 PROCEDURE — 0219U NFCT AGT HIV GNRJ SEQ ALYS: CPT

## 2024-03-28 PROCEDURE — 86592 SYPHILIS TEST NON-TREP QUAL: CPT

## 2024-03-29 LAB
CD3 CELLS # BLD: 2009 CELLS/MCL (ref 550–2202)
CD3 CELLS NFR BLD: 76 % (ref 58–86)
CD3+CD4+ CELLS # BLD: 1071 CELLS/MCL (ref 365–1437)
CD3+CD4+ CELLS NFR BLD: 41 % (ref 32–64)
CD3+CD4+ CELLS/CD3+CD8+ CLL BLD: 1.2 %
CD3+CD8+ CELLS # BLD: 907 CELLS/MCL (ref 145–846)
CD3+CD8+ CELLS NFR BLD: 34 % (ref 13–40)
CD45 CELLS # BLD: 2.63 THOU/MCL (ref 0.82–2.84)

## 2024-03-30 LAB
HIV1 RNA # PLAS NAA DL=20: ABNORMAL COPIES/ML
RPR SER QL: REACTIVE
RPR SER-TITR: ABNORMAL {TITER}

## 2024-04-02 ENCOUNTER — TELEPHONE (OUTPATIENT)
Dept: INFECTIOUS DISEASES | Facility: CLINIC | Age: 44
End: 2024-04-02
Payer: MEDICARE

## 2024-04-02 NOTE — TELEPHONE ENCOUNTER
Patient contacted the clinic. Questions regarding Syphilis result. Informed that he would always test positive for syphilis and did review dils now at 0 where as previous dils 128. Reassured. Voiced understanding and appreciates results reviewed.

## 2024-04-04 LAB
ABACAVIR ISLT GENOTYP: NORMAL
ATAZANAVIR+RITONAVIR ISLT GENOTYP: NORMAL
BICTEGRAVIR ISLT GENOTYP: NORMAL
CABOTEGRAVIR ISLT GENOTYP: NORMAL
DARUNAVIR+RITONAVIR ISLT GENOTYP: NORMAL
DIDANOSINE ISLT GENOTYP: NORMAL
DOLUTEGRAVIR ISLT GENOTYP: NORMAL
DORAVIRINE ISLT GENOTYP: NORMAL
EFAVIRENZ ISLT GENOTYP: NORMAL
ELVITEGRAVIR ISLT GENOTYP: NORMAL
EMTRICITABINE ISLT GENOTYP: NORMAL
ETRAVIRINE ISLT GENOTYP: NORMAL
FOSAMPRENAVIR+RITONAVIR ISLT GENOTYP: NORMAL
HIV 1 RNA GENOTYPE ISLT: NORMAL
HIV 1 RNA RT + PR + IN MUT DET PLAS SEQ: NORMAL
HIV NNRTI GENE MUT DET ISLT: NORMAL
HIV NRTI GENE MUT DET ISLT: NORMAL
HIV PI GENE MUT DET ISLT: NORMAL
HIV1 INTEGRASE GENE MUT DET ISLT: NORMAL
INDINAVIR+RITONAVIR ISLT GENOTYP: NORMAL
LAB AOE PNL PATIENT: NORMAL
LAMIVUDINE ISLT GENOTYP: NORMAL
LOPINAVIR+RITONAVIR ISLT GENOTYP: NORMAL
M INTEGRASE FAILED CODONS: NORMAL
M PROTEASE FAILED CODONS: NORMAL
M REVERSE TRANSCRIPTASE FAILED CODONS: NORMAL
NELFINAVIR ISLT GENOTYP: NORMAL
NEVIRAPINE ISLT GENOTYP: NORMAL
RALTEGRAVIR ISLT GENOTYP: NORMAL
RILPIVIRINE ISLT GENOTYP: NORMAL
SAQUINAVIR+RITONAVIR ISLT GENOTYP: NORMAL
STAVUDINE ISLT GENOTYP: NORMAL
TENOFOVIR ISLT GENOTYP: NORMAL
TIPRANAVIR+RITONAVIR ISLT GENOTYP: NORMAL
ZIDOVUDINE ISLT GENOTYP: NORMAL

## 2024-04-08 ENCOUNTER — OFFICE VISIT (OUTPATIENT)
Dept: UROLOGY | Facility: CLINIC | Age: 44
End: 2024-04-08
Payer: MEDICAID

## 2024-04-08 VITALS
HEIGHT: 71 IN | RESPIRATION RATE: 20 BRPM | HEART RATE: 101 BPM | BODY MASS INDEX: 19.04 KG/M2 | DIASTOLIC BLOOD PRESSURE: 81 MMHG | SYSTOLIC BLOOD PRESSURE: 144 MMHG | WEIGHT: 136 LBS | OXYGEN SATURATION: 95 % | TEMPERATURE: 98 F

## 2024-04-08 DIAGNOSIS — N50.812 PAIN IN LEFT TESTICLE: Primary | ICD-10-CM

## 2024-04-08 DIAGNOSIS — Q55.22 RETRACTILE TESTIS: ICD-10-CM

## 2024-04-08 LAB
BILIRUB SERPL-MCNC: NORMAL MG/DL
BLOOD URINE, POC: NORMAL
COLOR, POC UA: YELLOW
GLUCOSE UR QL STRIP: NORMAL
KETONES UR QL STRIP: NORMAL
LEUKOCYTE ESTERASE URINE, POC: NORMAL
NITRITE, POC UA: NORMAL
PH, POC UA: 6.5
PROTEIN, POC: 30
SPECIFIC GRAVITY, POC UA: >=1.03
UROBILINOGEN, POC UA: 2

## 2024-04-08 PROCEDURE — 99215 OFFICE O/P EST HI 40 MIN: CPT | Mod: PBBFAC | Performed by: UROLOGY

## 2024-04-08 PROCEDURE — 1159F MED LIST DOCD IN RCRD: CPT | Mod: CPTII,,, | Performed by: UROLOGY

## 2024-04-08 PROCEDURE — 81001 URINALYSIS AUTO W/SCOPE: CPT | Mod: PBBFAC | Performed by: UROLOGY

## 2024-04-08 PROCEDURE — 99214 OFFICE O/P EST MOD 30 MIN: CPT | Mod: S$PBB,,, | Performed by: UROLOGY

## 2024-04-08 PROCEDURE — 3079F DIAST BP 80-89 MM HG: CPT | Mod: CPTII,,, | Performed by: UROLOGY

## 2024-04-08 PROCEDURE — 3077F SYST BP >= 140 MM HG: CPT | Mod: CPTII,,, | Performed by: UROLOGY

## 2024-04-08 PROCEDURE — 3008F BODY MASS INDEX DOCD: CPT | Mod: CPTII,,, | Performed by: UROLOGY

## 2024-04-08 PROCEDURE — 1160F RVW MEDS BY RX/DR IN RCRD: CPT | Mod: CPTII,,, | Performed by: UROLOGY

## 2024-04-08 NOTE — PROGRESS NOTES
CC:  Retractile testicle    HPI:  Claude Hall is a 43 y.o. male seen for follow-up of retractile testicle.  Approximately two to three years ago he had epididymitis on left. After that he noticed that the left testicle will go up into the groin. This is very painful. It is intermittent but may occur weekly. He does maneuvers such as milking it down or soaking in hot water to try to get testicle down and this is usually successful. He has had multiple scrotal ultrasounds which were noncontributory.  When I saw him last time we had discussed testicular fixation and he wanted to wait until he was done with this semester of school.  He is back now to schedule the surgery.  He persists in having problems with this and it has been getting worse.    Urinalysis:  Results for orders placed or performed in visit on 04/08/24   POCT URINE DIPSTICK WITH MICROSCOPE, AUTOMATED   Result Value Ref Range    Color, UA Yellow     Spec Grav UA >=1.030     pH, UA 6.5     WBC, UA neg     Nitrite, UA neg     Protein, POC 30     Glucose, UA neg     Ketones, UA neg     Urobilinogen, UA 2.0     Bilirubin, POC neg     Blood, UA neg      Microscopic Urinalysis:  WBC:   None per HPF     RBC:    None per HPF     Bacteria:    None per HPF     Squamous epithelial cells:    None per HPF      Crystals:   None    Lab Results:    Recent Labs     03/28/24  1524   CREATININE 1.25*       ROS:  All systems reviewed and are negative except as documented in HPI and/or Assessment and Plan.     Patient Active Problem List:     Patient Active Problem List   Diagnosis    Gastroparesis    Pharyngoesophageal dysphagia    Zenker's diverticulum    Tobacco user    Elevated blood-pressure reading without diagnosis of hypertension    Encounter to establish care    Vitamin D deficiency    Gastroesophageal reflux disease without esophagitis        Past Medical History:  Past Medical History:   Diagnosis Date    ADHD (attention deficit hyperactivity disorder)      Anemia     Gastroparesis     HIV (human immunodeficiency virus infection)         Past Surgical History:  Past Surgical History:   Procedure Laterality Date    FRACTURE SURGERY  1997    Left arm broken    gastric pacemaker      HEMORRHOID SURGERY      MEDIPORT INSERTION, SINGLE Left         Family History:  Family History   Problem Relation Age of Onset    COPD Mother     Diabetes Mother     No Known Problems Father         Social History:  Social History     Socioeconomic History    Marital status: Single   Tobacco Use    Smoking status: Light Smoker     Current packs/day: 0.25     Average packs/day: 0.3 packs/day for 56.3 years (14.1 ttl pk-yrs)     Types: Cigarettes     Start date: 1988    Smokeless tobacco: Never   Substance and Sexual Activity    Alcohol use: Not Currently    Drug use: Not Currently     Types: Methamphetamines    Sexual activity: Yes     Partners: Male     Birth control/protection: None     Comment: Partner on Prep, not since last visit        Allergies:  Review of patient's allergies indicates:   Allergen Reactions    Lorazepam Other (See Comments) and Anxiety     Twitching, involuntary movements.   Other reaction(s): violent twitching, increased anxiety  Loose time, twitching, and he doesn't remember anything.      Adhesive         Objective:  Vitals:    04/08/24 1133   BP: (!) 144/81   Pulse: 101   Resp: 20   Temp: 97.9 °F (36.6 °C)     General:  Well developed, well nourished adult male in no acute distress  Abdomen: Soft, nontender, no masses  Extremities:  No clubbing, cyanosis, or edema  Neurologic:  Grossly intact  Musculoskeletal:  Normal tone  Penis:  Circumcised, no lesions  Scrotum:  No hydrocele  Testicles:  Nontender, no masses  Epididymis:  Normal without masses    Assessment:  1. Retractile testis  - Case Request Operating Room: ORCHIOPEXY    2. Pain in left testicle  - POCT URINE DIPSTICK WITH MICROSCOPE, AUTOMATED  - Case Request Operating Room: ORCHIOPEXY     Plan:  We will  proceed with orchiopexy of the left testicle.  He does not have any problems or complaints related to the right side.    Follow-up:  After surgery.

## 2024-04-08 NOTE — PROGRESS NOTES
Seen by Dr. Jade.  Surgery scheduled 5/7/24.  Consent signed and witnessed. Surgery instruction sheet explained and given to patient.  Post op appt scheduled.  Discharge instructions verbal and written given.

## 2024-04-25 ENCOUNTER — ANESTHESIA EVENT (OUTPATIENT)
Dept: SURGERY | Facility: HOSPITAL | Age: 44
End: 2024-04-25
Payer: MEDICARE

## 2024-04-25 NOTE — ANESTHESIA PREPROCEDURE EVALUATION
Claude Hall is a 43 y.o. male PRESENTING FOR ORCHIOPEXY (Left: Scrotum)  with a history of   -RETRACTILE L TESTICLE W/ H/O EPIDIDYMITIS           Vitals:    05/07/24 0911 05/07/24 0920 05/07/24 0957   BP:  124/85 124/85   Pulse:  83    Resp:  18    Temp:  36.4 °C (97.5 °F)    TempSrc:  Oral    SpO2:  96%    Weight: 62 kg (136 lb 9.6 oz)        -HIV  -ANEMIA  -GASTROPARESIS W/ H/O GASTRIC STIMULATOR PLACED 2016 ---> RSI , pepcid   -GERD  -ADHD  -0.3 PPD SMOKER  -ANXIETY  -HTN  -H/O DRUG ABUSE (METH)  -H/O HEP C TREATED W/ MAVYRET 2022    BETA-BLOCKER: NONE    New Orders for Anesthesia: NONE      Past Surgical History:   Procedure Laterality Date    FRACTURE SURGERY  1997    Left arm broken    gastric pacemaker      HEMORRHOID SURGERY      MEDIPORT INSERTION, SINGLE Left        Lab Results   Component Value Date    WBC 8.29 03/28/2024    HGB 12.9 (L) 03/28/2024    HCT 41.6 (L) 03/28/2024     03/28/2024       CMP  Sodium   Date Value Ref Range Status   03/23/2017 142 136 - 145 mmol/L Final     Sodium Level   Date Value Ref Range Status   03/28/2024 142 136 - 145 mmol/L Final     Potassium   Date Value Ref Range Status   03/23/2017 3.7 3.5 - 5.1 mmol/L Final     Potassium Level   Date Value Ref Range Status   03/28/2024 4.1 3.5 - 5.1 mmol/L Final     Chloride   Date Value Ref Range Status   03/23/2017 102 95 - 110 mmol/L Final     CO2   Date Value Ref Range Status   03/23/2017 25 23 - 29 mmol/L Final     Carbon Dioxide   Date Value Ref Range Status   03/28/2024 28 22 - 29 mmol/L Final     Glucose   Date Value Ref Range Status   03/23/2017 113 (H) 70 - 110 mg/dL Final     BUN   Date Value Ref Range Status   03/23/2017 12 6 - 20 mg/dL Final     Blood Urea Nitrogen   Date Value Ref Range Status   03/28/2024 11.5 8.9 - 20.6 mg/dL Final     Creatinine   Date Value Ref Range Status   03/28/2024 1.25 (H) 0.73 - 1.18 mg/dL Final   03/23/2017 1.0 0.5 - 1.4 mg/dL Final     Calcium   Date Value Ref Range Status    03/23/2017 10.0 8.7 - 10.5 mg/dL Final     Calcium Level Total   Date Value Ref Range Status   03/28/2024 9.9 8.4 - 10.2 mg/dL Final     Total Protein   Date Value Ref Range Status   03/23/2017 7.9 6.0 - 8.4 g/dL Final     Albumin   Date Value Ref Range Status   03/23/2017 4.4 3.5 - 5.2 g/dL Final     Albumin Level   Date Value Ref Range Status   03/28/2024 3.9 3.5 - 5.0 g/dL Final     Total Bilirubin   Date Value Ref Range Status   03/23/2017 0.4 0.1 - 1.0 mg/dL Final     Comment:     For infants and newborns, interpretation of results should be based  on gestational age, weight and in agreement with clinical  observations.  Premature Infant recommended reference ranges:  Up to 24 hours.............<8.0 mg/dL  Up to 48 hours............<12.0 mg/dL  3-5 days..................<15.0 mg/dL  6-29 days.................<15.0 mg/dL       Bilirubin Total   Date Value Ref Range Status   03/28/2024 0.4 <=1.5 mg/dL Final     Alkaline Phosphatase   Date Value Ref Range Status   03/28/2024 87 40 - 150 unit/L Final   03/23/2017 79 55 - 135 U/L Final     AST   Date Value Ref Range Status   03/23/2017 19 10 - 40 U/L Final     Aspartate Aminotransferase   Date Value Ref Range Status   03/28/2024 15 5 - 34 unit/L Final     ALT   Date Value Ref Range Status   03/23/2017 19 10 - 44 U/L Final     Alanine Aminotransferase   Date Value Ref Range Status   03/28/2024 16 0 - 55 unit/L Final     Anion Gap   Date Value Ref Range Status   03/23/2017 15 8 - 16 mmol/L Final     eGFR   Date Value Ref Range Status   03/28/2024 >60 mls/min/1.73/m2 Final       Lab Results   Component Value Date    PROTIME 13.2 06/28/2022    INR 1.0 06/28/2022     EKG 5/31/22      Current Outpatient Medications   Medication Instructions    clonazePAM (KLONOPIN) 1 mg, Oral, Daily    emtricitabine-tenofovir alafen (DESCOVY) 200-25 mg Tab 1 tablet, Oral, Daily    ergocalciferol (ERGOCALCIFEROL) 50,000 Units, Oral, Every 7 days    ferrous sulfate 325 mg, Oral, Daily     lisdexamfetamine (VYVANSE) 40 mg, Oral, Daily    metoclopramide HCl (REGLAN) 10 mg, Oral, 2 times daily PRN    NexIUM 40 mg, Oral, 2 times daily before meals    PIFELTRO 100 mg, Oral, Daily    promethazine (PHENERGAN) 6.25 mg/5 mL syrup 5 mLs, Oral, Every 4 hours PRN    traZODone (DESYREL) 50 mg, Oral, Nightly       Past anesthesia records 3/2018:       Pre-op Assessment    I have reviewed the Patient Summary Reports.     I have reviewed the Nursing Notes. I have reviewed the NPO Status.   I have reviewed the Medications.     Review of Systems  Anesthesia Hx:  No problems with previous Anesthesia   History of prior surgery of interest to airway management or planning:          Denies Family Hx of Anesthesia complications.    Denies Personal Hx of Anesthesia complications.                    Hematology/Oncology:  Hematology Normal   Oncology Normal                                   EENT/Dental:  EENT/Dental Normal           Cardiovascular:  Cardiovascular Normal                                            Pulmonary:  Pulmonary Normal                       Renal/:  Renal/ Normal                 Hepatic/GI:  Hepatic/GI Normal                 Musculoskeletal:  Musculoskeletal Normal                Neurological:  Neurology Normal                                      Endocrine:  Endocrine Normal            Dermatological:  Skin Normal    Psych:  Psychiatric Normal                    Physical Exam  General: Well nourished, Cooperative, Alert and Oriented    Airway:  Mallampati: I / I  Mouth Opening: Normal  TM Distance: Normal  Tongue: Normal  Neck ROM: Normal ROM    Dental:  Intact        Anesthesia Plan  Type of Anesthesia, risks & benefits discussed:    Anesthesia Type: Gen ETT  Intra-op Monitoring Plan: Standard ASA Monitors  Post Op Pain Control Plan: IV/PO Opioids PRN  Induction:  IV and rapid sequence  Airway Plan: Direct  Informed Consent: Informed consent signed with the Patient representative and all parties  understand the risks and agree with anesthesia plan.  All questions answered. Patient consented to blood products? No  ASA Score: 3  Day of Surgery Review of History & Physical: H&P Update referred to the surgeon/provider.    Ready For Surgery From Anesthesia Perspective.     .

## 2024-05-05 ENCOUNTER — PATIENT MESSAGE (OUTPATIENT)
Dept: ADMINISTRATIVE | Facility: OTHER | Age: 44
End: 2024-05-05
Payer: MEDICARE

## 2024-05-06 ENCOUNTER — PATIENT MESSAGE (OUTPATIENT)
Dept: SURGERY | Facility: HOSPITAL | Age: 44
End: 2024-05-06
Payer: MEDICARE

## 2024-05-07 ENCOUNTER — HOSPITAL ENCOUNTER (OUTPATIENT)
Facility: HOSPITAL | Age: 44
Discharge: HOME | End: 2024-05-07
Attending: UROLOGY | Admitting: UROLOGY
Payer: MEDICARE

## 2024-05-07 ENCOUNTER — ANESTHESIA (OUTPATIENT)
Dept: SURGERY | Facility: HOSPITAL | Age: 44
End: 2024-05-07
Payer: MEDICARE

## 2024-05-07 DIAGNOSIS — Z98.890 S/P ORCHIOPEXY: ICD-10-CM

## 2024-05-07 PROCEDURE — 71000033 HC RECOVERY, INTIAL HOUR: Performed by: UROLOGY

## 2024-05-07 PROCEDURE — 71000015 HC POSTOP RECOV 1ST HR: Performed by: UROLOGY

## 2024-05-07 PROCEDURE — 37000008 HC ANESTHESIA 1ST 15 MINUTES: Performed by: UROLOGY

## 2024-05-07 PROCEDURE — 63600175 PHARM REV CODE 636 W HCPCS: Mod: JZ,JG

## 2024-05-07 PROCEDURE — 63600175 PHARM REV CODE 636 W HCPCS: Performed by: NURSE ANESTHETIST, CERTIFIED REGISTERED

## 2024-05-07 PROCEDURE — 63600175 PHARM REV CODE 636 W HCPCS: Performed by: UROLOGY

## 2024-05-07 PROCEDURE — 54640 ORCHIOPEXY INGUN/SCROT APPR: CPT | Mod: LT,,, | Performed by: UROLOGY

## 2024-05-07 PROCEDURE — 25000003 PHARM REV CODE 250: Performed by: SPECIALIST

## 2024-05-07 PROCEDURE — 27201423 OPTIME MED/SURG SUP & DEVICES STERILE SUPPLY: Performed by: UROLOGY

## 2024-05-07 PROCEDURE — 37000009 HC ANESTHESIA EA ADD 15 MINS: Performed by: UROLOGY

## 2024-05-07 PROCEDURE — 25000003 PHARM REV CODE 250: Performed by: UROLOGY

## 2024-05-07 PROCEDURE — D9220A PRA ANESTHESIA: Mod: CRNA,,, | Performed by: NURSE ANESTHETIST, CERTIFIED REGISTERED

## 2024-05-07 PROCEDURE — 25000003 PHARM REV CODE 250: Performed by: NURSE ANESTHETIST, CERTIFIED REGISTERED

## 2024-05-07 PROCEDURE — 63600175 PHARM REV CODE 636 W HCPCS: Performed by: SPECIALIST

## 2024-05-07 PROCEDURE — 36000706: Performed by: UROLOGY

## 2024-05-07 PROCEDURE — 71000016 HC POSTOP RECOV ADDL HR: Performed by: UROLOGY

## 2024-05-07 PROCEDURE — 36000707: Performed by: UROLOGY

## 2024-05-07 PROCEDURE — D9220A PRA ANESTHESIA: Mod: ANES,,, | Performed by: SPECIALIST

## 2024-05-07 RX ORDER — PROMETHAZINE HYDROCHLORIDE 25 MG/ML
INJECTION, SOLUTION INTRAMUSCULAR; INTRAVENOUS
Status: COMPLETED
Start: 2024-05-07 | End: 2024-05-07

## 2024-05-07 RX ORDER — DIPHENHYDRAMINE HYDROCHLORIDE 50 MG/ML
25 INJECTION INTRAMUSCULAR; INTRAVENOUS ONCE AS NEEDED
Status: DISCONTINUED | OUTPATIENT
Start: 2024-05-07 | End: 2024-05-07 | Stop reason: HOSPADM

## 2024-05-07 RX ORDER — ONDANSETRON HYDROCHLORIDE 2 MG/ML
4 INJECTION, SOLUTION INTRAVENOUS ONCE
Status: COMPLETED | OUTPATIENT
Start: 2024-05-07 | End: 2024-05-07

## 2024-05-07 RX ORDER — PROPOFOL 10 MG/ML
VIAL (ML) INTRAVENOUS
Status: DISCONTINUED | OUTPATIENT
Start: 2024-05-07 | End: 2024-05-07

## 2024-05-07 RX ORDER — SUCCINYLCHOLINE CHLORIDE 20 MG/ML
INJECTION INTRAMUSCULAR; INTRAVENOUS
Status: DISCONTINUED | OUTPATIENT
Start: 2024-05-07 | End: 2024-05-07

## 2024-05-07 RX ORDER — HYDRALAZINE HYDROCHLORIDE 20 MG/ML
INJECTION INTRAMUSCULAR; INTRAVENOUS
Status: DISCONTINUED
Start: 2024-05-07 | End: 2024-05-07 | Stop reason: HOSPADM

## 2024-05-07 RX ORDER — LABETALOL HCL 20 MG/4 ML
SYRINGE (ML) INTRAVENOUS
Status: COMPLETED
Start: 2024-05-07 | End: 2024-05-07

## 2024-05-07 RX ORDER — ONDANSETRON HYDROCHLORIDE 2 MG/ML
INJECTION, SOLUTION INTRAMUSCULAR; INTRAVENOUS
Status: DISCONTINUED | OUTPATIENT
Start: 2024-05-07 | End: 2024-05-07

## 2024-05-07 RX ORDER — OXYCODONE AND ACETAMINOPHEN 5; 325 MG/1; MG/1
2 TABLET ORAL ONCE
Status: COMPLETED | OUTPATIENT
Start: 2024-05-07 | End: 2024-05-07

## 2024-05-07 RX ORDER — CEFAZOLIN SODIUM 1 G/3ML
2 INJECTION, POWDER, FOR SOLUTION INTRAMUSCULAR; INTRAVENOUS ONCE
Status: COMPLETED | OUTPATIENT
Start: 2024-05-07 | End: 2024-05-07

## 2024-05-07 RX ORDER — DEXAMETHASONE SODIUM PHOSPHATE 4 MG/ML
INJECTION, SOLUTION INTRA-ARTICULAR; INTRALESIONAL; INTRAMUSCULAR; INTRAVENOUS; SOFT TISSUE
Status: DISCONTINUED | OUTPATIENT
Start: 2024-05-07 | End: 2024-05-07

## 2024-05-07 RX ORDER — HYDROMORPHONE HYDROCHLORIDE 1 MG/ML
0.2 INJECTION, SOLUTION INTRAMUSCULAR; INTRAVENOUS; SUBCUTANEOUS EVERY 5 MIN PRN
Status: DISCONTINUED | OUTPATIENT
Start: 2024-05-07 | End: 2024-05-07 | Stop reason: HOSPADM

## 2024-05-07 RX ORDER — PROCHLORPERAZINE EDISYLATE 5 MG/ML
5 INJECTION INTRAMUSCULAR; INTRAVENOUS ONCE AS NEEDED
Status: DISCONTINUED | OUTPATIENT
Start: 2024-05-07 | End: 2024-05-07 | Stop reason: HOSPADM

## 2024-05-07 RX ORDER — IPRATROPIUM BROMIDE AND ALBUTEROL SULFATE 2.5; .5 MG/3ML; MG/3ML
3 SOLUTION RESPIRATORY (INHALATION) ONCE AS NEEDED
Status: DISCONTINUED | OUTPATIENT
Start: 2024-05-07 | End: 2024-05-07 | Stop reason: HOSPADM

## 2024-05-07 RX ORDER — HYDROMORPHONE HYDROCHLORIDE 1 MG/ML
0.5 INJECTION, SOLUTION INTRAMUSCULAR; INTRAVENOUS; SUBCUTANEOUS EVERY 5 MIN PRN
Status: DISCONTINUED | OUTPATIENT
Start: 2024-05-07 | End: 2024-05-07 | Stop reason: HOSPADM

## 2024-05-07 RX ORDER — FENTANYL CITRATE 50 UG/ML
INJECTION, SOLUTION INTRAMUSCULAR; INTRAVENOUS
Status: DISCONTINUED | OUTPATIENT
Start: 2024-05-07 | End: 2024-05-07

## 2024-05-07 RX ORDER — LIDOCAINE HYDROCHLORIDE 20 MG/ML
INJECTION, SOLUTION EPIDURAL; INFILTRATION; INTRACAUDAL; PERINEURAL
Status: DISCONTINUED | OUTPATIENT
Start: 2024-05-07 | End: 2024-05-07

## 2024-05-07 RX ORDER — SODIUM CHLORIDE, SODIUM LACTATE, POTASSIUM CHLORIDE, CALCIUM CHLORIDE 600; 310; 30; 20 MG/100ML; MG/100ML; MG/100ML; MG/100ML
INJECTION, SOLUTION INTRAVENOUS CONTINUOUS
Status: DISCONTINUED | OUTPATIENT
Start: 2024-05-07 | End: 2024-05-07 | Stop reason: HOSPADM

## 2024-05-07 RX ORDER — BACITRACIN 500 [USP'U]/G
OINTMENT TOPICAL
Status: DISCONTINUED | OUTPATIENT
Start: 2024-05-07 | End: 2024-05-07 | Stop reason: HOSPADM

## 2024-05-07 RX ORDER — MEPERIDINE HYDROCHLORIDE 25 MG/ML
12.5 INJECTION INTRAMUSCULAR; INTRAVENOUS; SUBCUTANEOUS ONCE
Status: COMPLETED | OUTPATIENT
Start: 2024-05-07 | End: 2024-05-07

## 2024-05-07 RX ORDER — KETOROLAC TROMETHAMINE 30 MG/ML
INJECTION, SOLUTION INTRAMUSCULAR; INTRAVENOUS
Status: DISCONTINUED | OUTPATIENT
Start: 2024-05-07 | End: 2024-05-07

## 2024-05-07 RX ORDER — HYDROCODONE BITARTRATE AND ACETAMINOPHEN 5; 325 MG/1; MG/1
1 TABLET ORAL EVERY 6 HOURS PRN
Qty: 20 TABLET | Refills: 0 | OUTPATIENT
Start: 2024-05-07 | End: 2024-06-11

## 2024-05-07 RX ORDER — HYDRALAZINE HYDROCHLORIDE 20 MG/ML
10 INJECTION INTRAMUSCULAR; INTRAVENOUS ONCE
Status: COMPLETED | OUTPATIENT
Start: 2024-05-07 | End: 2024-05-07

## 2024-05-07 RX ORDER — LIDOCAINE HYDROCHLORIDE 10 MG/ML
INJECTION INFILTRATION; PERINEURAL
Status: DISCONTINUED | OUTPATIENT
Start: 2024-05-07 | End: 2024-05-07 | Stop reason: HOSPADM

## 2024-05-07 RX ORDER — FAMOTIDINE 10 MG/ML
20 INJECTION INTRAVENOUS ONCE
Status: COMPLETED | OUTPATIENT
Start: 2024-05-07 | End: 2024-05-07

## 2024-05-07 RX ADMIN — MEPERIDINE HYDROCHLORIDE 12.5 MG: 25 INJECTION INTRAMUSCULAR; INTRAVENOUS; SUBCUTANEOUS at 01:05

## 2024-05-07 RX ADMIN — HYDRALAZINE HYDROCHLORIDE 10 MG: 20 INJECTION INTRAMUSCULAR; INTRAVENOUS at 02:05

## 2024-05-07 RX ADMIN — ONDANSETRON 4 MG: 2 INJECTION INTRAMUSCULAR; INTRAVENOUS at 12:05

## 2024-05-07 RX ADMIN — PROPOFOL 200 MG: 10 INJECTION, EMULSION INTRAVENOUS at 12:05

## 2024-05-07 RX ADMIN — OXYCODONE HYDROCHLORIDE AND ACETAMINOPHEN 2 TABLET: 5; 325 TABLET ORAL at 03:05

## 2024-05-07 RX ADMIN — CEFAZOLIN 2 G: 330 INJECTION, POWDER, FOR SOLUTION INTRAMUSCULAR; INTRAVENOUS at 12:05

## 2024-05-07 RX ADMIN — FAMOTIDINE 20 MG: 10 INJECTION, SOLUTION INTRAVENOUS at 09:05

## 2024-05-07 RX ADMIN — FENTANYL CITRATE 100 MCG: 50 INJECTION, SOLUTION INTRAMUSCULAR; INTRAVENOUS at 12:05

## 2024-05-07 RX ADMIN — LABETALOL HYDROCHLORIDE 5 MG: 5 INJECTION, SOLUTION INTRAVENOUS at 12:05

## 2024-05-07 RX ADMIN — SODIUM CHLORIDE, POTASSIUM CHLORIDE, SODIUM LACTATE AND CALCIUM CHLORIDE: 600; 310; 30; 20 INJECTION, SOLUTION INTRAVENOUS at 11:05

## 2024-05-07 RX ADMIN — PROMETHAZINE HYDROCHLORIDE 25 MG: 25 INJECTION INTRAMUSCULAR; INTRAVENOUS at 01:05

## 2024-05-07 RX ADMIN — HYDROMORPHONE HYDROCHLORIDE 0.5 MG: 1 INJECTION, SOLUTION INTRAMUSCULAR; INTRAVENOUS; SUBCUTANEOUS at 01:05

## 2024-05-07 RX ADMIN — DEXAMETHASONE SODIUM PHOSPHATE 8 MG: 4 INJECTION, SOLUTION INTRA-ARTICULAR; INTRALESIONAL; INTRAMUSCULAR; INTRAVENOUS; SOFT TISSUE at 12:05

## 2024-05-07 RX ADMIN — LABETALOL HYDROCHLORIDE 5 MG: 5 INJECTION, SOLUTION INTRAVENOUS at 01:05

## 2024-05-07 RX ADMIN — KETOROLAC TROMETHAMINE 30 MG: 30 INJECTION, SOLUTION INTRAMUSCULAR at 12:05

## 2024-05-07 RX ADMIN — SUCCINYLCHOLINE CHLORIDE 100 MG: 20 INJECTION, SOLUTION INTRAMUSCULAR; INTRAVENOUS; PARENTERAL at 12:05

## 2024-05-07 RX ADMIN — LIDOCAINE HYDROCHLORIDE 50 MG: 20 INJECTION, SOLUTION EPIDURAL; INFILTRATION; INTRACAUDAL; PERINEURAL at 12:05

## 2024-05-07 RX ADMIN — ONDANSETRON 4 MG: 2 INJECTION INTRAMUSCULAR; INTRAVENOUS at 05:05

## 2024-05-07 NOTE — DISCHARGE INSTRUCTIONS
· Keep follow up appointment [facility].  Resume home medications unless otherwise instructed by your doctor.    · No heavy lifting or straining or strenuous exercise for till clinic follow up.    · You may remove dressing in 48 hours.      ` You may take a shower after dressing is removed. Wash your body and let soapy water run over incision area (do not scrub incision), rinse with water, and pat dry.    · Do not soak your wound in water for two weeks. Do not take baths, swim, or use a hot tub until your doctor says it is okay.    · Use pain medication as instructed. Do not take Tylenol (acetaminophen) with your NORCO, since NORCO contains Tylenol as well.    · You may use an ice pack as needed for 20 minutes at a time over your incision site to minimize swelling and help relieve pain.    · Do not drink alcohol or drive today, or as long as you are on pain medication.    · Notify MD of any moderate to severe pain unrelieved by pain medicine, if your incision opens and/or bleeds, or for any signs of infection including fever above 100.4, excessive redness or swelling, yellow/green foul- smelling drainage, nausea or vomiting. Clinics number is [phone]. If it is after business hours or emergency call [phone] and state Im having post op complications and need to speak to the surgeon on call.    · Thanks for choosing [facility]! Have a smooth recovery!

## 2024-05-07 NOTE — TRANSFER OF CARE
Anesthesia Transfer of Care Note    Patient: Claude Hall    Procedure(s) Performed: Procedure(s) (LRB):  ORCHIOPEXY (Left)    Patient location: PACU    Anesthesia Type: general    Transport from OR: Transported from OR on room air with adequate spontaneous ventilation    Post pain: adequate analgesia    Post assessment: no apparent anesthetic complications    Post vital signs: stable    Level of consciousness: awake    Nausea/Vomiting: no nausea/vomiting    Complications: none    Transfer of care protocol was followed      Last vitals: Visit Vitals  /72   Pulse 88   Temp 36 °C (96.8 °F) (Temporal)   Resp 20   Wt 62 kg (136 lb 9.6 oz)   SpO2 100%   BMI 19.12 kg/m²

## 2024-05-07 NOTE — PLAN OF CARE
Problem: Adult Inpatient Plan of Care  Goal: Plan of Care Review  Outcome: Progressing  Goal: Patient-Specific Goal (Individualized)  Outcome: Progressing  Goal: Absence of Hospital-Acquired Illness or Injury  Outcome: Progressing  Goal: Optimal Comfort and Wellbeing  Outcome: Progressing  Goal: Readiness for Transition of Care  Outcome: Progressing     Problem: Infection  Goal: Absence of Infection Signs and Symptoms  Outcome: Progressing     Problem: Wound  Goal: Optimal Coping  Outcome: Progressing  Goal: Optimal Functional Ability  Outcome: Progressing  Goal: Absence of Infection Signs and Symptoms  Outcome: Progressing  Goal: Improved Oral Intake  Outcome: Progressing  Goal: Optimal Pain Control and Function  Outcome: Progressing  Goal: Skin Health and Integrity  Outcome: Progressing  Goal: Optimal Wound Healing  Outcome: Progressing

## 2024-05-07 NOTE — DISCHARGE SUMMARY
[facility]  Discharge Note  Short Stay    Procedure(s) (LRB):  ORCHIOPEXY (Left)      OUTCOME: Patient tolerated treatment/procedure well without complication and is now ready for discharge.    DISPOSITION: Home or Self Care    FINAL DIAGNOSIS:  Retractile testicle, left    FOLLOWUP: In clinic on 20 May 2024 at 10:30 am    DISCHARGE INSTRUCTIONS:    Discharge Procedure Orders   Lifting restrictions     Other restrictions (specify):     Remove dressing in 48 hours     Shower on day dressing removed (No bath)        TIME SPENT ON DISCHARGE: 15 minutes

## 2024-05-07 NOTE — ANESTHESIA POSTPROCEDURE EVALUATION
Anesthesia Post Evaluation    Patient: Claude Hall    Procedure(s) Performed: Procedure(s) (LRB):  ORCHIOPEXY (Left)    Final Anesthesia Type: general      Patient location during evaluation: PACU  Patient participation: Yes- Able to Participate  Level of consciousness: awake and responds to stimulation  Post-procedure vital signs: reviewed and stable  Pain management: adequate  Airway patency: patent    PONV status at discharge: No PONV  Anesthetic complications: no      Cardiovascular status: blood pressure returned to baseline  Respiratory status: unassisted  Hydration status: euvolemic  Follow-up not needed.              Vitals Value Taken Time   /117 05/07/24 1319   Temp 36.6 05/07/24 1323   Pulse 87 05/07/24 1323   Resp 18 05/07/24 1323   SpO2 100 % 05/07/24 1323   Vitals shown include unfiled device data.      No case tracking events are documented in the log.      Pain/Sid Score: No data recorded

## 2024-05-07 NOTE — OP NOTE
UROLOGY OPERATIVE NOTE        Date of Procedure:   7 May 2024    Pre-op Diagnosis:  Retractile testicle, left    Post-op Diagnosis:  Retractile testicle, left    Procedure:  Orchiopexy, left    Surgeon: Marli Jade D.O.    Assistant: None    Anesthesia: General via LMA    Complications: None    Blood Loss: 5ml    Implants: None    Disposition: Patient was taken to the PACU    Condition: Stable    Procedure Details:  Patient was taken to the operating room placed in the supine position.  Following adequate general anesthesia he was prepped and draped in usual sterile fashion. An incision was made down the median raphae on the scrotum using sharp dissection.  This was carried down through the subcutaneous tissue and tunica using electrocautery.  An opening was made in the tunic of the left testicle and a small amount hydrocele fluid was drained.  The fluid was clear and straw colored. The appendix epididymis was excised with electrocautery. The edges of the sac were then treated with electrocautery for hemostasis.  The orchiopexy was performed by tacking the scrotum inferiorly in the scrotum using 4-0 Prolene and similarly to the lateral scrotum using 4-0 Prolene. The testicle was returned to the left hemiscrotum in the correct orientation.  The dartos was closed with a running 3-0 Chromic.  A running subcuticular stitch was also done with 3-0 chromic.  The skin edges were approximated with a horizontal mattress suture. The wound was dressed with antibiotic ointment, fluffs, and scrotal support.  He tolerated the procedure well.  There were no complications.  He was taken to the postanesthesia care unit in stable condition.

## 2024-05-07 NOTE — H&P
CC:  Retractile testicle, left    HPI:  Claude Hall is a 43 y.o. male here for orchiopexy of a retractile testicle.  Approximately two to three years ago he had epididymitis on left. After that he noticed that the left testicle will go up into the groin. This is very painful. It is intermittent but may occur weekly. He does maneuvers such as milking it down or soaking in hot water to try to get testicle down and this is usually successful. He has had multiple scrotal ultrasounds which were noncontributory. The problem persists and is worsening.      ROS:  All systems reviewed and are negative except as documented in HPI and/or Assessment and Plan.     Patient Active Problem List:     Patient Active Problem List   Diagnosis    Gastroparesis    Pharyngoesophageal dysphagia    Zenker's diverticulum    Tobacco user    Elevated blood-pressure reading without diagnosis of hypertension    Encounter to establish care    Vitamin D deficiency    Gastroesophageal reflux disease without esophagitis        Past Medical History:  Past Medical History:   Diagnosis Date    ADHD (attention deficit hyperactivity disorder)     Anemia     Gastroparesis     HIV (human immunodeficiency virus infection)         Past Surgical History:  Past Surgical History:   Procedure Laterality Date    FRACTURE SURGERY  1997    Left arm broken    gastric pacemaker      HEMORRHOID SURGERY      MEDIPORT INSERTION, SINGLE Left         Family History  Family History   Problem Relation Name Age of Onset    COPD Mother Nia     Diabetes Mother Nia     No Known Problems Father          Social History:  Social History     Socioeconomic History    Marital status: Single   Tobacco Use    Smoking status: Light Smoker     Current packs/day: 0.25     Average packs/day: 0.3 packs/day for 56.3 years (14.1 ttl pk-yrs)     Types: Cigarettes     Start date: 1988    Smokeless tobacco: Never   Substance and Sexual Activity    Alcohol use: Not Currently      Comment: occ    Drug use: Not Currently     Types: Methamphetamines    Sexual activity: Yes     Partners: Male     Birth control/protection: None     Comment: Partner on Prep, not since last visit        Medications:  Current Outpatient Medications   Medication Instructions    clonazePAM (KLONOPIN) 1 mg, Oral, Daily    emtricitabine-tenofovir alafen (DESCOVY) 200-25 mg Tab 1 tablet, Oral, Daily    ergocalciferol (ERGOCALCIFEROL) 50,000 Units, Oral, Every 7 days    ferrous sulfate 325 mg, Oral, Daily    lisdexamfetamine (VYVANSE) 40 mg, Oral, Daily    metoclopramide HCl (REGLAN) 10 mg, Oral, 2 times daily PRN    NexIUM 40 mg, Oral, 2 times daily before meals    PIFELTRO 100 mg, Oral, Daily    promethazine (PHENERGAN) 6.25 mg/5 mL syrup 5 mLs, Oral, Every 4 hours PRN    traZODone (DESYREL) 50 mg, Oral, Nightly        Allergies:  Review of patient's allergies indicates:   Allergen Reactions    Lorazepam Other (See Comments) and Anxiety     Twitching, involuntary movements.   Other reaction(s): violent twitching, increased anxiety  Loose time, twitching, and he doesn't remember anything.      Adhesive         Objective:  There were no vitals filed for this visit.  General:  Well developed, well nourished adult male in no acute distress  Abdomen: Soft, nontender, no masses  Extremities:  No clubbing, cyanosis, or edema  Neurologic:  Grossly intact  Musculoskeletal:  Normal tone  Penis:  Circumcised, no lesions  Testicles:  Nontender, no masses  Epididymis:  Normal without masses    Assessment:  Retractile left testicle    Plan:  Orchipexy, left

## 2024-05-07 NOTE — ANESTHESIA PROCEDURE NOTES
Intubation    Date/Time: 5/7/2024 12:06 PM    Performed by: Mejia Thornton CRNA  Authorized by: Stefanie Azar MD    Intubation:     Induction:  Rapid sequence induction    Intubated:  Postinduction    Mask Ventilation:  Easy mask    Attempts:  1    Attempted By:  CRNA    Method of Intubation:  Direct    Blade:  Rafael 4    Laryngeal View Grade: Grade I - full view of cords      Difficult Airway Encountered?: No      Complications:  None    Airway Device:  Oral endotracheal tube    Airway Device Size:  7.5    Style/Cuff Inflation:  Cuffed    Inflation Amount (mL):  4    Tube secured:  22    Secured at:  The lips    Placement Verified By:  Capnometry    Complicating Factors:  None    Findings Post-Intubation:  BS equal bilateral

## 2024-05-08 VITALS
OXYGEN SATURATION: 97 % | BODY MASS INDEX: 19.12 KG/M2 | TEMPERATURE: 98 F | DIASTOLIC BLOOD PRESSURE: 110 MMHG | RESPIRATION RATE: 18 BRPM | SYSTOLIC BLOOD PRESSURE: 162 MMHG | WEIGHT: 136.63 LBS | HEART RATE: 75 BPM

## 2024-05-15 ENCOUNTER — HOSPITAL ENCOUNTER (EMERGENCY)
Facility: HOSPITAL | Age: 44
Discharge: HOME | End: 2024-05-15
Attending: FAMILY MEDICINE
Payer: MEDICARE

## 2024-05-15 VITALS
DIASTOLIC BLOOD PRESSURE: 84 MMHG | RESPIRATION RATE: 20 BRPM | TEMPERATURE: 99 F | OXYGEN SATURATION: 96 % | BODY MASS INDEX: 18.69 KG/M2 | WEIGHT: 138 LBS | HEART RATE: 111 BPM | HEIGHT: 72 IN | SYSTOLIC BLOOD PRESSURE: 127 MMHG

## 2024-05-15 DIAGNOSIS — Z63.8 FAMILY CONFLICT: Primary | ICD-10-CM

## 2024-05-15 PROCEDURE — 99281 EMR DPT VST MAYX REQ PHY/QHP: CPT

## 2024-05-15 SDOH — SOCIAL DETERMINANTS OF HEALTH (SDOH): OTHER SPECIFIED PROBLEMS RELATED TO PRIMARY SUPPORT GROUP: Z63.8
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
## 2024-05-20 ENCOUNTER — OFFICE VISIT (OUTPATIENT)
Dept: UROLOGY | Facility: CLINIC | Age: 44
End: 2024-05-20
Payer: MEDICARE

## 2024-05-20 VITALS
RESPIRATION RATE: 18 BRPM | WEIGHT: 134 LBS | HEIGHT: 72 IN | DIASTOLIC BLOOD PRESSURE: 77 MMHG | SYSTOLIC BLOOD PRESSURE: 133 MMHG | OXYGEN SATURATION: 96 % | HEART RATE: 86 BPM | BODY MASS INDEX: 18.15 KG/M2

## 2024-05-20 DIAGNOSIS — Q55.22 RETRACTILE TESTIS: Primary | ICD-10-CM

## 2024-05-20 PROCEDURE — 1160F RVW MEDS BY RX/DR IN RCRD: CPT | Mod: CPTII,,, | Performed by: UROLOGY

## 2024-05-20 PROCEDURE — 99214 OFFICE O/P EST MOD 30 MIN: CPT | Mod: PBBFAC | Performed by: UROLOGY

## 2024-05-20 PROCEDURE — 99024 POSTOP FOLLOW-UP VISIT: CPT | Mod: ,,, | Performed by: UROLOGY

## 2024-05-20 PROCEDURE — 3075F SYST BP GE 130 - 139MM HG: CPT | Mod: CPTII,,, | Performed by: UROLOGY

## 2024-05-20 PROCEDURE — 3078F DIAST BP <80 MM HG: CPT | Mod: CPTII,,, | Performed by: UROLOGY

## 2024-05-20 PROCEDURE — 1159F MED LIST DOCD IN RCRD: CPT | Mod: CPTII,,, | Performed by: UROLOGY

## 2024-05-20 RX ORDER — CEPHALEXIN 500 MG/1
500 CAPSULE ORAL EVERY 12 HOURS
Qty: 14 CAPSULE | Refills: 0 | Status: SHIPPED | OUTPATIENT
Start: 2024-05-20

## 2024-05-21 NOTE — PROGRESS NOTES
CC:  Postop    HPI:  Claude Hall is a 43 y.o. male seen for follow-up after orchiopexy.  He had a left orchiopexy on 7 May 2024 a retractile left testicle.  He has done well since the surgery he had minimal pain.  The sutures completely fell out this morning.  He states he has been scrubbing the incision with Hibiclens.    Urinalysis:  Results for orders placed or performed in visit on 04/08/24   POCT URINE DIPSTICK WITH MICROSCOPE, AUTOMATED   Result Value Ref Range    Color, UA Yellow     Spec Grav UA >=1.030     pH, UA 6.5     WBC, UA neg     Nitrite, UA neg     Protein, POC 30     Glucose, UA neg     Ketones, UA neg     Urobilinogen, UA 2.0     Bilirubin, POC neg     Blood, UA neg      Microscopic Urinalysis:  WBC:   None per HPF     RBC:    None per HPF     Bacteria:    None per HPF     Squamous epithelial cells:    None per HPF      Crystals:   None    ROS:  All systems reviewed and are negative except as documented in HPI and/or Assessment and Plan.     Patient Active Problem List:     Patient Active Problem List   Diagnosis    Gastroparesis    Pharyngoesophageal dysphagia    Zenker's diverticulum    Tobacco user    Elevated blood-pressure reading without diagnosis of hypertension    Encounter to establish care    Vitamin D deficiency    Gastroesophageal reflux disease without esophagitis        Past Medical History:  Past Medical History:   Diagnosis Date    ADHD (attention deficit hyperactivity disorder)     Anemia     Gastroparesis     HIV (human immunodeficiency virus infection)         Past Surgical History:  Past Surgical History:   Procedure Laterality Date    FRACTURE SURGERY  1997    Left arm broken    gastric pacemaker      HEMORRHOID SURGERY      MEDIPORT INSERTION, SINGLE Left     ORCHIOPEXY Left 5/7/2024    Procedure: ORCHIOPEXY;  Surgeon: Marli Jade DO;  Location: [facility];  Service: Urology;  Laterality: Left;        Family History:  Family History   Problem Relation Name Age of Onset     COPD Mother Nia     Diabetes Mother Nia     No Known Problems Father          Social History:  Social History     Socioeconomic History    Marital status: Single   Tobacco Use    Smoking status: Light Smoker     Current packs/day: 0.25     Average packs/day: 0.3 packs/day for 56.4 years (14.1 ttl pk-yrs)     Types: Cigarettes     Start date: 1988    Smokeless tobacco: Never   Substance and Sexual Activity    Alcohol use: Not Currently     Comment: occ    Drug use: Not Currently     Types: Methamphetamines    Sexual activity: Yes     Partners: Male     Birth control/protection: None     Comment: Partner on Prep, not since last visit        Allergies:  Review of patient's allergies indicates:   Allergen Reactions    Lorazepam Other (See Comments) and Anxiety     Twitching, involuntary movements.   Other reaction(s): violent twitching, increased anxiety  Loose time, twitching, and he doesn't remember anything.      Adhesive         Objective:  Vitals:    05/20/24 1053   BP: 133/77   Pulse: 86   Resp: 18     General:  Well developed, well nourished adult male in no acute distress  Abdomen: Soft, nontender, no masses  Extremities:  No clubbing, cyanosis, or edema  Neurologic:  Grossly intact  Musculoskeletal:  Normal tone  Penis:  Circumcised, no lesions  Scrotum:  There were no sutures remaining.  The incision is healing.  There is a little erythema around the incision.  No purulent drainage.    Assessment:  1. Retractile testis  - POCT URINE DIPSTICK WITH MICROSCOPE, AUTOMATED  - cephALEXin (KEFLEX) 500 MG capsule; Take 1 capsule (500 mg total) by mouth every 12 (twelve) hours.  Dispense: 14 capsule; Refill: 0     Plan:  I do not think that there is an infection but to be on the safe side I have given him Keflex.  He was instructed to watch for drainage or increased redness.  He will return if any of those things occur.    Follow-up:  Three months.

## 2024-05-28 ENCOUNTER — PATIENT MESSAGE (OUTPATIENT)
Dept: INFECTIOUS DISEASES | Facility: CLINIC | Age: 44
End: 2024-05-28

## 2024-05-28 ENCOUNTER — OFFICE VISIT (OUTPATIENT)
Dept: INFECTIOUS DISEASES | Facility: CLINIC | Age: 44
End: 2024-05-28
Payer: MEDICARE

## 2024-05-28 DIAGNOSIS — K31.84 GASTROPARESIS: ICD-10-CM

## 2024-05-28 DIAGNOSIS — R68.89 OTHER GENERAL SYMPTOMS AND SIGNS: ICD-10-CM

## 2024-05-28 DIAGNOSIS — Z86.19 HISTORY OF HEPATITIS C: ICD-10-CM

## 2024-05-28 DIAGNOSIS — E55.9 VITAMIN D DEFICIENCY, UNSPECIFIED: ICD-10-CM

## 2024-05-28 DIAGNOSIS — R79.9 ABNORMAL FINDING OF BLOOD CHEMISTRY, UNSPECIFIED: ICD-10-CM

## 2024-05-28 DIAGNOSIS — B20 HIV DISEASE: Primary | ICD-10-CM

## 2024-05-28 PROCEDURE — 1159F MED LIST DOCD IN RCRD: CPT | Mod: CPTII,95,, | Performed by: NURSE PRACTITIONER

## 2024-05-28 PROCEDURE — 99214 OFFICE O/P EST MOD 30 MIN: CPT | Mod: 95,,, | Performed by: NURSE PRACTITIONER

## 2024-05-28 PROCEDURE — 1160F RVW MEDS BY RX/DR IN RCRD: CPT | Mod: CPTII,95,, | Performed by: NURSE PRACTITIONER

## 2024-05-28 RX ORDER — EMTRICITABINE AND TENOFOVIR ALAFENAMIDE 200; 25 MG/1; MG/1
1 TABLET ORAL DAILY
Qty: 30 TABLET | Refills: 4 | Status: SHIPPED | OUTPATIENT
Start: 2024-05-28

## 2024-05-28 RX ORDER — METOCLOPRAMIDE 10 MG/1
10 TABLET ORAL 2 TIMES DAILY PRN
Qty: 60 TABLET | Refills: 4 | Status: SHIPPED | OUTPATIENT
Start: 2024-05-28

## 2024-05-28 RX ORDER — DORAVIRINE 100 MG/1
100 TABLET, FILM COATED ORAL DAILY
Qty: 30 TABLET | Refills: 4 | Status: SHIPPED | OUTPATIENT
Start: 2024-05-28

## 2024-05-28 RX ORDER — CLINDAMYCIN HYDROCHLORIDE 300 MG/1
CAPSULE ORAL
COMMUNITY
Start: 2024-04-24

## 2024-05-28 RX ORDER — MUPIROCIN 20 MG/G
OINTMENT TOPICAL
COMMUNITY
Start: 2024-04-24

## 2024-05-28 NOTE — PROGRESS NOTES
Patient ID: Claude Hall 43 y.o.     Chief Complaint:   Chief Complaint   Patient presents with    Followup HIV     Denies problems   Patient and provider are located in the state [facility].    Face to Face time with patient:  30 minutes of total time spent on the encounter, which includes face to face time and non-face to face time preparing to see the patient (eg, review of tests), Obtaining and/or reviewing separately obtained history, Documenting clinical information in the electronic or other health record, Independently interpreting results (not separately reported) and communicating results to the patient/family/caregiver, or Care coordination (not separately reported).     Each patient to whom he or she provides medical services by telemedicine is:  (1) informed of the relationship between the physician and patient and the respective role of any other health care provider with respect to management of the patient; and (2) notified that he or she may decline to receive medical services by telemedicine and may withdraw from such care at any time.    HPI:    5/28/24  RAUL is a 42 yo WM evaluated today via audio-video virtual visit for f/u. He did have a treatment interruption in medications due to insurance issues. Last labs 3/28/24 VL [id number], CD4 1071. RPR titer decreased to 0 dils. Denies any sexual encounters with new partners. He has been back on Descovy & Pifeltro x 6-8 weeks now, will repeat labs. 3/28/24 virus was pan-sensitive. He tells me that he and Manas have split up after 14 years due to stressors of having family member living with them.  He is currently staying in [place] and will have fasting labs collected there. He is healing well from the orchiopexy and still followed by Dr. Jade. He has no questions or concerns today.     3/26/24  RAUL is a 42 yo WM evaluated today via audio-video virtual visit for f/u. He states that he did resume Descovy & Pifeltro after last visit, but ran out  again about 2 weeks ago. He is having trouble with getting medications filled as disability was reversed and Medicare insurance was not paid. He is covered by Medicaid, but they will not cover medications because their records states that he does have Medicare. Disability has been reinstated but Medicare still not covering medications per pharmacy. He has been working diligently with disability office regarding this and is [place] today regarding same. Will come in tomorrow for labs, states that he must have forgotten to come in for labs after last visit. Will reassess RPR titer as well, denies any new partners. He did attend visit with Dr. Jade and is planning surgery early summer to have testicle tacked. Stressed importance of having medications resumed asap to get back to viral suppression preoperatively. Voiced understanding & appreciation.      12/26/23  RAUL is a 44 yo WM evaluated today via audio-video virtual visit for HIV f/u. He tells me that he was out of Descovy & Pifeltro for about 3 weeks due to insurance concern, recently resumed 12/8/23 once issue was cleared. He tolerates it well, labs 7/18/23 HIV UD, HCV UD.  RPR titer 1:128. Completed the 14 days of doxycycline as prescribed & partner(s) were notified to seek treatment as well. Will repeat titer with labs. Will come later this week for labs & flu vaccine as recommended. Orders placed. Last eye exam 8/2023, wears correction. Today he is experiencing testicular pain that recurs every few months for the past several years. He has presented to ED on multiple occasions and had scrotal u/s done.  He states that he has been told that he has a hydrocele in the past, not evident on most recent ultrasounds. Appreciates referral to urology for expert evaluation. Order placed. All questions answered & concerns addressed.           Past Medical History:   Diagnosis Date    ADHD (attention deficit hyperactivity disorder)     Anemia     Gastroparesis      HIV (human immunodeficiency virus infection)         Past Surgical History:   Procedure Laterality Date    FRACTURE SURGERY  1997    Left arm broken    gastric pacemaker      HEMORRHOID SURGERY      MEDIPORT INSERTION, SINGLE Left     ORCHIOPEXY Left 5/7/2024    Procedure: ORCHIOPEXY;  Surgeon: Marli Jade DO;  Location: [facility];  Service: Urology;  Laterality: Left;        Social History     Socioeconomic History    Marital status: Single   Tobacco Use    Smoking status: Light Smoker     Current packs/day: 0.25     Average packs/day: 0.3 packs/day for 56.4 years (14.1 ttl pk-yrs)     Types: Cigarettes     Start date: 1988    Smokeless tobacco: Never   Substance and Sexual Activity    Alcohol use: Not Currently     Comment: social    Drug use: Not Currently     Types: Methamphetamines    Sexual activity: Yes     Partners: Male     Birth control/protection: None     Comment: Partner on Prep, not since last visit        Family History   Problem Relation Name Age of Onset    COPD Mother Nia     Diabetes Mother Nia     No Known Problems Father          Review of patient's allergies indicates:   Allergen Reactions    Lorazepam Other (See Comments) and Anxiety     Twitching, involuntary movements.   Other reaction(s): violent twitching, increased anxiety  Loose time, twitching, and he doesn't remember anything.      Adhesive         Immunization History   Administered Date(s) Administered    COVID-19 Vaccine 08/16/2021    COVID-19, MRNA, LN-S, PF (Pfizer) (Purple Cap) 03/27/2021, 04/17/2021    HPV 9-Valent 04/08/2019, 07/08/2019, 11/11/2019    Hepatitis A 07/25/2011    Hepatitis A, Adult 08/01/2012    Hepatitis B, Adult 04/01/2011    Influenza 12/11/2013    Influenza - Quadrivalent 10/13/2010, 10/20/2016, 10/30/2017, 11/11/2019, 09/26/2020, 11/18/2021    Influenza - Quadrivalent - PF (6-35 months) 10/30/2017, 11/15/2018    Influenza - Quadrivalent - PF *Preferred* (6 months and older) 10/13/2010, 10/30/2017,  11/11/2019, 09/26/2020, 10/06/2022    Influenza - Trivalent (ADULT) 10/13/2010    Influenza - Trivalent - PF (ADULT) 10/13/2010, 12/01/2013, 10/13/2014, 12/23/2015, 10/20/2016, 10/30/2017    Meningococcal Conjugate (MCV4P) 11/15/2018, 04/08/2019    Pneumococcal Conjugate - 13 Valent 10/13/2014    Pneumococcal Conjugate - 7 Valent 02/25/2008    Pneumococcal Polysaccharide - 23 Valent 03/01/2013, 02/27/2018    Td (ADULT) 07/21/2014    Td (Adult), Unspecified Formulation 07/21/2014    Td - PF (ADULT) 07/21/2014        Review of Systems   Constitutional: Negative.    HENT: Negative.     Eyes: Negative.    Respiratory: Negative.     Cardiovascular: Negative.    Gastrointestinal: Negative.    Genitourinary: Negative.    Musculoskeletal: Negative.    Skin: Negative.    Neurological: Negative.    Endo/Heme/Allergies: Negative.    Psychiatric/Behavioral: Negative.     All other systems reviewed and are negative.         Objective:      There were no vitals taken for this visit.     Physical Exam  Constitutional:       General: He is not in acute distress.     Appearance: Normal appearance.   HENT:      Head: Normocephalic.   Eyes:      Conjunctiva/sclera: Conjunctivae normal.   Pulmonary:      Effort: Pulmonary effort is normal.   Musculoskeletal:         General: Normal range of motion.      Cervical back: Normal range of motion.   Neurological:      General: No focal deficit present.      Mental Status: He is alert and oriented to person, place, and time. Mental status is at baseline.   Psychiatric:         Mood and Affect: Mood normal.         Behavior: Behavior normal.         Thought Content: Thought content normal.         Judgment: Judgment normal.          Labs:   Lab Results   Component Value Date    WBC 8.29 03/28/2024    HGB 12.9 (L) 03/28/2024    HCT 41.6 (L) 03/28/2024    MCV 86.0 03/28/2024     03/28/2024       CMP  Sodium   Date Value Ref Range Status   03/28/2024 142 136 - 145 mmol/L Final   03/23/2017  142 136 - 145 mmol/L Final     Potassium   Date Value Ref Range Status   03/28/2024 4.1 3.5 - 5.1 mmol/L Final   03/23/2017 3.7 3.5 - 5.1 mmol/L Final     Chloride   Date Value Ref Range Status   03/23/2017 102 95 - 110 mmol/L Final     CO2   Date Value Ref Range Status   03/28/2024 28 22 - 29 mmol/L Final   03/23/2017 25 23 - 29 mmol/L Final     Glucose   Date Value Ref Range Status   03/23/2017 113 (H) 70 - 110 mg/dL Final     BUN   Date Value Ref Range Status   03/23/2017 12 6 - 20 mg/dL Final     Blood Urea Nitrogen   Date Value Ref Range Status   03/28/2024 11.5 8.9 - 20.6 mg/dL Final     Creatinine   Date Value Ref Range Status   03/28/2024 1.25 (H) 0.73 - 1.18 mg/dL Final   03/23/2017 1.0 0.5 - 1.4 mg/dL Final     Calcium   Date Value Ref Range Status   03/28/2024 9.9 8.4 - 10.2 mg/dL Final   03/23/2017 10.0 8.7 - 10.5 mg/dL Final     Total Protein   Date Value Ref Range Status   03/23/2017 7.9 6.0 - 8.4 g/dL Final     Albumin   Date Value Ref Range Status   03/28/2024 3.9 3.5 - 5.0 g/dL Final   03/23/2017 4.4 3.5 - 5.2 g/dL Final     Total Bilirubin   Date Value Ref Range Status   03/23/2017 0.4 0.1 - 1.0 mg/dL Final     Comment:     For infants and newborns, interpretation of results should be based  on gestational age, weight and in agreement with clinical  observations.  Premature Infant recommended reference ranges:  Up to 24 hours.............<8.0 mg/dL  Up to 48 hours............<12.0 mg/dL  3-5 days..................<15.0 mg/dL  6-29 days.................<15.0 mg/dL       Bilirubin Total   Date Value Ref Range Status   03/28/2024 0.4 <=1.5 mg/dL Final     Alkaline Phosphatase   Date Value Ref Range Status   03/23/2017 79 55 - 135 U/L Final     ALP   Date Value Ref Range Status   03/28/2024 87 40 - 150 unit/L Final     AST   Date Value Ref Range Status   03/28/2024 15 5 - 34 unit/L Final   03/23/2017 19 10 - 40 U/L Final     ALT   Date Value Ref Range Status   03/28/2024 16 0 - 55 unit/L Final    03/23/2017 19 10 - 44 U/L Final     Anion Gap   Date Value Ref Range Status   03/23/2017 15 8 - 16 mmol/L Final     eGFR   Date Value Ref Range Status   03/28/2024 >60 mls/min/1.73/m2 Final     Lab Results   Component Value Date    TSH 0.218 (L) 02/07/2023     Hep C Ab Interp   Date Value Ref Range Status   11/18/2021 Reactive (A) >Nonreactive Final     Syphilis Antibody   Date Value Ref Range Status   03/28/2024 Reactive (A) Nonreactive, Equivocal Final     RPR   Date Value Ref Range Status   03/28/2024 Reactive (A) Non-Reactive Final     RPR Titer   Date Value Ref Range Status   03/28/2024 0 dils (A) (none) Final     Cholesterol Total   Date Value Ref Range Status   02/07/2023 225 (H) <=200 mg/dL Final     HDL Cholesterol   Date Value Ref Range Status   02/07/2023 60 35 - 60 mg/dL Final     Triglyceride   Date Value Ref Range Status   02/07/2023 225 (H) 34 - 140 mg/dL Final     Cholesterol/HDL Ratio   Date Value Ref Range Status   02/07/2023 4 0 - 5 Final     Very Low Density Lipoprotein   Date Value Ref Range Status   02/07/2023 45  Final     LDL Cholesterol   Date Value Ref Range Status   02/07/2023 120.00 50.00 - 140.00 mg/dL Final     Vitamin D   Date Value Ref Range Status   02/07/2023 42.9 30.0 - 80.0 ng/mL Final     Results for orders placed or performed in visit on 10/06/22   CD4 Lymphocytes   Result Value Ref Range    Patient Age 42     WBC Absolute 7,300 4,500 - 11,500 /mm3    Lymph Percent 32.2 28 - 48 %    Lymph Absolute 2,350.6 1,260 - 5,520 x10(3)/mcL    CD4 % 34.8 %    CD4 Absolute 818.0088 589 - 1,505 unit/L    T Cell Interp       Normal total lymphocyte absolute count and normal percentage.  Normal CD4+ T helper cell absolute count and normal percentage.    Glendy Mike MD       Results for orders placed or performed in visit on 03/28/24   HIV-1 RNA, Quantitative, PCR with Reflex to Genotype   Result Value Ref Range    HIV-1 RNA Detect/Quant, P 82219 (A) Undetected copies/mL     Results  for orders placed or performed in visit on 02/07/23   Quantiferon Gold TB   Result Value Ref Range    QuantiFERON-Tb Gold Plus Result Negative Negative    TB1 Ag minus Nil Result 0.02 IU/mL    TB2 Ag minus Nil Result 0.05 IU/mL    Mitogen minus Nil Result >10.00 IU/mL    Nil Result 0.01 IU/mL     Results for orders placed or performed in visit on 07/18/23   C.trach/N.gonor AMP RNA    Specimen: Throat   Result Value Ref Range    Chlamydia trachomatis amplified RNA Negative Negative    Neisseria gonorrhoeae amplified RNA Negative Negative    Source SEE COMMENTS     SOURCE: SEE COMMENTS      Results for orders placed or performed in visit on 02/07/23   Urinalysis   Result Value Ref Range    Color, UA Yellow Yellow, Light-Yellow, Dark Yellow, Melanie, Straw    Appearance, UA Clear Clear    Specific Gravity, UA 1.034     pH, UA 6.0 5.0 - 8.5    Protein, UA Trace (A) Negative mg/dL    Glucose, UA Normal Negative, Normal mg/dL    Ketones, UA Trace (A) Negative mg/dL    Blood, UA Negative Negative unit/L    Bilirubin, UA Negative Negative mg/dL    Urobilinogen, UA 1+ (A) 0.2, 1.0, Normal mg/dL    Nitrites, UA Negative Negative    Leukocyte Esterase, UA Negative Negative unit/L    WBC, UA 0-5 None Seen, 0-2, 3-5, 0-5 /HPF    Bacteria, UA None Seen None Seen /HPF    Squamous Epithelial Cells, UA Trace (A) None Seen /HPF    Mucous, UA Moderate (A) None Seen /LPF    Hyaline Casts, UA None Seen None Seen /lpf    RBC, UA 0-5 None Seen, 0-2, 3-5, 0-5 /HPF       Imaging: Reviewed most recent relevant imaging studies available, notable results highlighted in this note    Medications:     Current Outpatient Medications   Medication Instructions    cephALEXin (KEFLEX) 500 mg, Oral, Every 12 hours    clindamycin (CLEOCIN) 300 MG capsule     clonazePAM (KLONOPIN) 1 mg, Oral, Daily    emtricitabine-tenofovir alafen (DESCOVY) 200-25 mg Tab 1 tablet, Oral, Daily    HYDROcodone-acetaminophen (NORCO) 5-325 mg per tablet 1 tablet, Oral, Every 6  hours PRN    metoclopramide HCl (REGLAN) 10 mg, Oral, 2 times daily PRN    mupirocin (BACTROBAN) 2 % ointment APPLY TOPICALLY TO THE AFFECTED AREA TWICE DAILY FOR 7 DAYS    NexIUM 40 mg, Oral, 2 times daily before meals    PIFELTRO 100 mg, Oral, Daily    promethazine (PHENERGAN) 6.25 mg/5 mL syrup 5 mLs, Oral, Every 4 hours PRN    traZODone (DESYREL) 50 mg, Oral, Nightly       Assessment:       Problem List Items Addressed This Visit          GI    Gastroparesis    Relevant Medications    metoclopramide HCl (REGLAN) 10 MG tablet     Other Visit Diagnoses       HIV disease    -  Primary    Relevant Medications    emtricitabine-tenofovir alafen (DESCOVY) 200-25 mg Tab    doravirine (PIFELTRO) 100 mg Tab    Other Relevant Orders    Quantiferon Gold TB    Hemoglobin A1C    Vitamin D    TSH    Lipid Panel    SYPHILIS ANTIBODY (WITH REFLEX RPR)    Chlamydia/GC, PCR    Urinalysis    Comprehensive Metabolic Panel    CBC Auto Differential    CD4 Lymphocytes    HIV-1 RNA, Quantitative, PCR with Reflex to Genotype    Hepatitis A antibody, IgG    Hepatitis B Surface Ab, Qualitative    Hepatitis B Core Antibody, Total    Hepatitis B Surface Antigen    Abnormal finding of blood chemistry, unspecified        Relevant Orders    Hemoglobin A1C    Lipid Panel    Vitamin D deficiency, unspecified        Relevant Orders    Vitamin D    Other general symptoms and signs        Relevant Orders    TSH    History of hepatitis C        Relevant Orders    Hepatitis C RNA, Quantitative, PCR               Plan:      HIV disease  -     emtricitabine-tenofovir alafen (DESCOVY) 200-25 mg Tab; Take 1 tablet by mouth once daily.  Dispense: 30 tablet; Refill: 4  -     doravirine (PIFELTRO) 100 mg Tab; Take 1 tablet (100 mg total) by mouth Daily.  Dispense: 30 tablet; Refill: 4  -     Quantiferon Gold TB; Future; Expected date: 05/28/2024  -     Hemoglobin A1C; Future; Expected date: 05/28/2024  -     Cancel: Hepatitis C Antibody; Future; Expected  date: 05/28/2024  -     Vitamin D; Future; Expected date: 05/28/2024  -     TSH; Future; Expected date: 05/28/2024  -     Lipid Panel; Future; Expected date: 05/28/2024  -     SYPHILIS ANTIBODY (WITH REFLEX RPR); Future; Expected date: 05/28/2024  -     Chlamydia/GC, PCR; Future; Expected date: 05/28/2024  -     Urinalysis; Future; Expected date: 05/28/2024  -     Comprehensive Metabolic Panel; Future; Expected date: 05/28/2024  -     CBC Auto Differential; Future; Expected date: 05/28/2024  -     CD4 Lymphocytes; Future; Expected date: 05/28/2024  -     HIV-1 RNA, Quantitative, PCR with Reflex to Genotype; Future; Expected date: 05/28/2024  -     Hepatitis A antibody, IgG; Future; Expected date: 05/28/2024  -     Hepatitis B Surface Ab, Qualitative; Future; Expected date: 05/28/2024  -     Hepatitis B Core Antibody, Total; Future; Expected date: 05/28/2024  -     Hepatitis B Surface Antigen; Future; Expected date: 05/28/2024  Adherence and sexual health counseling done.  Use condoms for all sexual encounters.  Blood precautions.   Continue Descovy & Pifeltro daily.  Repeat labs this week.  RTC 4 months with Mary Ann in clinic.     HIV Wellness:  Anal pap: HRA with Biopsy 1/5/22  Oral CT/GC: 12/22 Neg, 7/23 Neg  Anal CT/GC: 12/22 Neg, 7/23 Neg  Urine CT/GC: 12/22 Neg, 7/23 Neg  RPR: 2/23 Neg ab, 7/23 128 dils, 1/24 0 dils.  Ophth: 8/2023    Gastroparesis  -     metoclopramide HCl (REGLAN) 10 MG tablet; Take 1 tablet (10 mg total) by mouth 2 (two) times daily as needed (prn upset stomach).  Dispense: 60 tablet; Refill: 4    Abnormal finding of blood chemistry, unspecified  -     Hemoglobin A1C; Future; Expected date: 05/28/2024  -     Lipid Panel; Future; Expected date: 05/28/2024    Vitamin D deficiency, unspecified  -     Vitamin D; Future; Expected date: 05/28/2024    Other general symptoms and signs  -     TSH; Future; Expected date: 05/28/2024    History of hepatitis C  -     Hepatitis C RNA, Quantitative, PCR;  Future; Expected date: 05/28/2024  HX: Treatment naive.  GT 1a, baseline VL [id number]  Fibrosure 6/28/22 A 0, F 0  FibroScan: not a candidate due to implanted gastric pacer  Not a candidate for Epclusa due to DDI with Nexium which is medically necessary   Completed Mavyret 3 tabs daily x 8 weeks on 10/6/22, started 8/10/22  HCV not detected 9/7/22, week 4 and 10/6/22 end of treatment  Blood & sex precautions: do not share a razor, needle, toothbrush, or clippers with anyone.  Partner tested HCV negative.  HCV VL not detected 2/7/23, 7/2023.   Repeat HCV RNA.     History of syphilis  Baseline titer 128 dils 7/2023  Treated with Doxycycline 100 mg bid x 14 days due to national shortage of Bicillin at that time.   Repeat titer 0 dils 1/2024.  Repeat with labs as ordered.

## 2024-06-04 NOTE — TELEPHONE ENCOUNTER
RTC 4 months with Mary Ann Umanzor NP, in clinic.     Phoned patient. No answer. Message left on voice mail to contact clinic about appt scheduling or reference the link the provider sent for appt scheduling.

## 2024-06-11 ENCOUNTER — HOSPITAL ENCOUNTER (EMERGENCY)
Facility: HOSPITAL | Age: 44
Discharge: HOME | End: 2024-06-11
Attending: INTERNAL MEDICINE
Payer: MEDICARE

## 2024-06-11 VITALS
OXYGEN SATURATION: 97 % | BODY MASS INDEX: 17.82 KG/M2 | DIASTOLIC BLOOD PRESSURE: 79 MMHG | SYSTOLIC BLOOD PRESSURE: 125 MMHG | RESPIRATION RATE: 15 BRPM | WEIGHT: 131.38 LBS | TEMPERATURE: 98 F | HEART RATE: 73 BPM

## 2024-06-11 DIAGNOSIS — R30.0 DYSURIA: ICD-10-CM

## 2024-06-11 DIAGNOSIS — N50.819: ICD-10-CM

## 2024-06-11 DIAGNOSIS — N50.812 TESTICULAR PAIN, LEFT: ICD-10-CM

## 2024-06-11 DIAGNOSIS — G89.18: ICD-10-CM

## 2024-06-11 DIAGNOSIS — I16.0 HYPERTENSIVE URGENCY: Primary | ICD-10-CM

## 2024-06-11 LAB
ALBUMIN SERPL-MCNC: 4.4 G/DL (ref 3.5–5)
ALBUMIN/GLOB SERPL: 1 RATIO (ref 1.1–2)
ALP SERPL-CCNC: 83 UNIT/L (ref 40–150)
ALT SERPL-CCNC: 13 UNIT/L (ref 0–55)
ANION GAP SERPL CALC-SCNC: 12 MEQ/L
AST SERPL-CCNC: 25 UNIT/L (ref 5–34)
BASOPHILS # BLD AUTO: 0.04 X10(3)/MCL
BASOPHILS NFR BLD AUTO: 0.5 %
BILIRUB SERPL-MCNC: 0.4 MG/DL
BUN SERPL-MCNC: 9.9 MG/DL (ref 8.9–20.6)
CALCIUM SERPL-MCNC: 10.4 MG/DL (ref 8.4–10.2)
CHLORIDE SERPL-SCNC: 106 MMOL/L (ref 98–107)
CO2 SERPL-SCNC: 21 MMOL/L (ref 22–29)
CREAT SERPL-MCNC: 1.09 MG/DL (ref 0.73–1.18)
CREAT/UREA NIT SERPL: 9
EOSINOPHIL # BLD AUTO: 0.17 X10(3)/MCL (ref 0–0.9)
EOSINOPHIL NFR BLD AUTO: 2.2 %
ERYTHROCYTE [DISTWIDTH] IN BLOOD BY AUTOMATED COUNT: 17 % (ref 11.5–17)
GFR SERPLBLD CREATININE-BSD FMLA CKD-EPI: >60 ML/MIN/1.73/M2
GLOBULIN SER-MCNC: 4.2 GM/DL (ref 2.4–3.5)
GLUCOSE SERPL-MCNC: 104 MG/DL (ref 74–100)
HCT VFR BLD AUTO: 42.4 % (ref 42–52)
HGB BLD-MCNC: 14 G/DL (ref 14–18)
IMM GRANULOCYTES # BLD AUTO: 0.02 X10(3)/MCL (ref 0–0.04)
IMM GRANULOCYTES NFR BLD AUTO: 0.3 %
LYMPHOCYTES # BLD AUTO: 2.01 X10(3)/MCL (ref 0.6–4.6)
LYMPHOCYTES NFR BLD AUTO: 25.5 %
MCH RBC QN AUTO: 28.6 PG (ref 27–31)
MCHC RBC AUTO-ENTMCNC: 33 G/DL (ref 33–36)
MCV RBC AUTO: 86.7 FL (ref 80–94)
MONOCYTES # BLD AUTO: 0.42 X10(3)/MCL (ref 0.1–1.3)
MONOCYTES NFR BLD AUTO: 5.3 %
NEUTROPHILS # BLD AUTO: 5.23 X10(3)/MCL (ref 2.1–9.2)
NEUTROPHILS NFR BLD AUTO: 66.2 %
NRBC BLD AUTO-RTO: 0 %
PLATELET # BLD AUTO: 215 X10(3)/MCL (ref 130–400)
PLATELETS.RETICULATED NFR BLD AUTO: 6.5 % (ref 0.9–11.2)
PMV BLD AUTO: 10.2 FL (ref 7.4–10.4)
POTASSIUM SERPL-SCNC: 4.9 MMOL/L (ref 3.5–5.1)
PROT SERPL-MCNC: 8.6 GM/DL (ref 6.4–8.3)
RBC # BLD AUTO: 4.89 X10(6)/MCL (ref 4.7–6.1)
SODIUM SERPL-SCNC: 139 MMOL/L (ref 136–145)
WBC # SPEC AUTO: 7.89 X10(3)/MCL (ref 4.5–11.5)

## 2024-06-11 PROCEDURE — 85025 COMPLETE CBC W/AUTO DIFF WBC: CPT | Performed by: INTERNAL MEDICINE

## 2024-06-11 PROCEDURE — 80053 COMPREHEN METABOLIC PANEL: CPT | Performed by: INTERNAL MEDICINE

## 2024-06-11 PROCEDURE — 99285 EMERGENCY DEPT VISIT HI MDM: CPT | Mod: 25

## 2024-06-11 PROCEDURE — 96361 HYDRATE IV INFUSION ADD-ON: CPT

## 2024-06-11 PROCEDURE — 96375 TX/PRO/DX INJ NEW DRUG ADDON: CPT

## 2024-06-11 PROCEDURE — 25000003 PHARM REV CODE 250: Performed by: INTERNAL MEDICINE

## 2024-06-11 PROCEDURE — 63600175 PHARM REV CODE 636 W HCPCS: Performed by: INTERNAL MEDICINE

## 2024-06-11 PROCEDURE — 96374 THER/PROPH/DIAG INJ IV PUSH: CPT

## 2024-06-11 RX ORDER — CIPROFLOXACIN 500 MG/1
500 TABLET ORAL 2 TIMES DAILY
Qty: 20 TABLET | Refills: 0 | Status: SHIPPED | OUTPATIENT
Start: 2024-06-11 | End: 2024-06-21

## 2024-06-11 RX ORDER — HYDROCODONE BITARTRATE AND ACETAMINOPHEN 5; 325 MG/1; MG/1
1 TABLET ORAL EVERY 6 HOURS PRN
Qty: 10 TABLET | Refills: 0 | Status: SHIPPED | OUTPATIENT
Start: 2024-06-11

## 2024-06-11 RX ORDER — HYDRALAZINE HYDROCHLORIDE 20 MG/ML
10 INJECTION INTRAMUSCULAR; INTRAVENOUS
Status: COMPLETED | OUTPATIENT
Start: 2024-06-11 | End: 2024-06-11

## 2024-06-11 RX ORDER — ONDANSETRON 4 MG/1
4 TABLET, ORALLY DISINTEGRATING ORAL
Status: COMPLETED | OUTPATIENT
Start: 2024-06-11 | End: 2024-06-11

## 2024-06-11 RX ORDER — LABETALOL HCL 20 MG/4 ML
20 SYRINGE (ML) INTRAVENOUS
Status: COMPLETED | OUTPATIENT
Start: 2024-06-11 | End: 2024-06-11

## 2024-06-11 RX ORDER — SUCRALFATE 1 G/10ML
1 SUSPENSION ORAL
Status: COMPLETED | OUTPATIENT
Start: 2024-06-11 | End: 2024-06-11

## 2024-06-11 RX ORDER — HYDROCODONE BITARTRATE AND ACETAMINOPHEN 7.5; 325 MG/1; MG/1
1 TABLET ORAL ONCE
Status: COMPLETED | OUTPATIENT
Start: 2024-06-11 | End: 2024-06-11

## 2024-06-11 RX ORDER — METOCLOPRAMIDE HYDROCHLORIDE 5 MG/ML
10 INJECTION INTRAMUSCULAR; INTRAVENOUS
Status: COMPLETED | OUTPATIENT
Start: 2024-06-11 | End: 2024-06-11

## 2024-06-11 RX ADMIN — SUCRALFATE 1 G: 1 SUSPENSION ORAL at 04:06

## 2024-06-11 RX ADMIN — SODIUM CHLORIDE, POTASSIUM CHLORIDE, SODIUM LACTATE AND CALCIUM CHLORIDE 500 ML: 600; 310; 30; 20 INJECTION, SOLUTION INTRAVENOUS at 04:06

## 2024-06-11 RX ADMIN — LABETALOL HYDROCHLORIDE 20 MG: 5 INJECTION, SOLUTION INTRAVENOUS at 05:06

## 2024-06-11 RX ADMIN — ONDANSETRON 4 MG: 4 TABLET, ORALLY DISINTEGRATING ORAL at 01:06

## 2024-06-11 RX ADMIN — HYDROCODONE BITARTRATE AND ACETAMINOPHEN 1 TABLET: 7.5; 325 TABLET ORAL at 01:06

## 2024-06-11 RX ADMIN — METOCLOPRAMIDE 10 MG: 5 INJECTION, SOLUTION INTRAMUSCULAR; INTRAVENOUS at 04:06

## 2024-06-11 RX ADMIN — HYDRALAZINE HYDROCHLORIDE 10 MG: 20 INJECTION INTRAMUSCULAR; INTRAVENOUS at 04:06

## 2024-06-11 NOTE — Clinical Note
"Claude"Lenard Hall was seen and treated in our emergency department on 6/11/2024.  He may return to school on 06/13/2024.      If you have any questions or concerns, please don't hesitate to call.      POOJA BERMUDEZ RN"

## 2024-06-11 NOTE — ED PROVIDER NOTES
Encounter Date: 6/11/2024       History     Chief Complaint   Patient presents with    Testicle Pain    Nausea    Abdominal Pain     Pt w co lt sided testicular pain, hx of orchiopexy on 5/7/24.  Reports dysuria, abd pain w NV.  PT HX OF GASTROPARESIS.  NO FEVER REPORTED.      Presents with testicular pain and dysuria, states Orchiopexy last month. Denies fever. Hx of DM, gastroparesis.    The history is provided by the patient.     Review of patient's allergies indicates:   Allergen Reactions    Lorazepam Other (See Comments) and Anxiety     Twitching, involuntary movements.   Other reaction(s): violent twitching, increased anxiety  Loose time, twitching, and he doesn't remember anything.      Adhesive      Past Medical History:   Diagnosis Date    ADHD (attention deficit hyperactivity disorder)     Anemia     Gastroparesis     HIV (human immunodeficiency virus infection)      Past Surgical History:   Procedure Laterality Date    FRACTURE SURGERY  1997    Left arm broken    gastric pacemaker      HEMORRHOID SURGERY      MEDIPORT INSERTION, SINGLE Left     ORCHIOPEXY Left 5/7/2024    Procedure: ORCHIOPEXY;  Surgeon: Marli Jade DO;  Location: [facility];  Service: Urology;  Laterality: Left;     Family History   Problem Relation Name Age of Onset    COPD Mother Nia     Diabetes Mother Nia     No Known Problems Father       Social History     Tobacco Use    Smoking status: Light Smoker     Current packs/day: 0.25     Average packs/day: 0.3 packs/day for 56.4 years (14.1 ttl pk-yrs)     Types: Cigarettes     Start date: 1988    Smokeless tobacco: Never   Substance Use Topics    Alcohol use: Not Currently     Comment: social    Drug use: Not Currently     Types: Methamphetamines     Review of Systems   Gastrointestinal:  Positive for nausea.   Genitourinary:  Positive for dysuria and testicular pain.       Physical Exam     Initial Vitals [06/11/24 1245]   BP Pulse Resp Temp SpO2   (!) 188/118 77 18 97.9 °F  (36.6 °C) 97 %      MAP       --         Physical Exam    Nursing note and vitals reviewed.  Constitutional: He appears well-developed. No distress.   HENT:   Head: Normocephalic and atraumatic.   Eyes: Conjunctivae are normal. Pupils are equal, round, and reactive to light.   Neck: Neck supple. No JVD present.   Normal range of motion.  Cardiovascular:  Normal rate, regular rhythm, normal heart sounds and intact distal pulses.           Pulmonary/Chest: Breath sounds normal. No respiratory distress.   Abdominal: Abdomen is soft. Bowel sounds are normal. He exhibits no distension. There is no abdominal tenderness. There is no rebound and no guarding.   Genitourinary:    Genitourinary Comments: Tender left testicle, surgical wound healed, palpable cord on left side, no drainage     Musculoskeletal:         General: No edema. Normal range of motion.      Cervical back: Normal range of motion and neck supple.     Neurological: He is alert and oriented to person, place, and time. He has normal strength.   Skin: Skin is warm and dry. No rash noted.   Psychiatric: His behavior is normal.         ED Course   Procedures  Labs Reviewed   COMPREHENSIVE METABOLIC PANEL - Abnormal; Notable for the following components:       Result Value    CO2 21 (*)     Glucose 104 (*)     Calcium 10.4 (*)     Protein Total 8.6 (*)     Globulin 4.2 (*)     Albumin/Globulin Ratio 1.0 (*)     All other components within normal limits   CBC W/ AUTO DIFFERENTIAL    Narrative:     The following orders were created for panel order CBC auto differential.  Procedure                               Abnormality         Status                     ---------                               -----------         ------                     CBC with Differential[[id number]]                           Final result                 Please view results for these tests on the individual orders.   CBC WITH DIFFERENTIAL   URINALYSIS, REFLEX TO URINE CULTURE           Imaging Results              US Scrotum And Testicles (Final result)  Result time 06/11/24 15:15:24      Final result by Lauren Da Silva MD (06/11/24 15:15:24)                   Impression:      Bilateral scrotal wall thickening.  Otherwise no acute abnormality.      Electronically signed by: Lauren Da Silva  Date:    06/11/2024  Time:    15:15               Narrative:    EXAMINATION:  US SCROTUM AND TESTICLES    CLINICAL HISTORY:  Left testicular pain    TECHNIQUE:  Sonography of the scrotum and testes.  Grayscale, color Doppler and spectral Doppler evaluation.    COMPARISON:  Ultrasound dated 02/27/2023    FINDINGS:  RIGHT TESTICLE:    Size: 3.8 x 1.8 x 2.4 cm    Appearance: Normal echotexture. No mass.    Flow: Normal arterial and venous flow    Epididymis: Normal.    Hydrocele: None.    Varicocele: None.    LEFT TESTICLE:    Size: 3.6 x 1.5 x 2.6 cm    Appearance: Normal echotexture. No mass.    Flow: Normal arterial and venous flow    Epididymis: Normal.    Hydrocele: None.    Varicocele: None.    OTHER: Bilateral scrotal wall thickening.                                       Medications   HYDROcodone-acetaminophen 7.5-325 mg per tablet 1 tablet (1 tablet Oral Given 6/11/24 1337)   ondansetron disintegrating tablet 4 mg (4 mg Oral Given 6/11/24 1324)   lactated ringers bolus 500 mL (0 mLs Intravenous Stopped 6/11/24 1728)   hydrALAZINE injection 10 mg (10 mg Intravenous Given 6/11/24 1625)   metoclopramide injection 10 mg (10 mg Intravenous Given 6/11/24 1622)   sucralfate 100 mg/mL suspension 1 g (1 g Oral Given 6/11/24 1623)   labetalol 20 mg/4 mL (5 mg/mL) IV syring (20 mg Intravenous Given 6/11/24 1719)     Medical Decision Making  Amount and/or Complexity of Data Reviewed  Labs: ordered. Decision-making details documented in ED Course.  Radiology: ordered and independent interpretation performed. Decision-making details documented in ED Course.    Risk  Prescription drug  management.      Additional MDM:   Differential Diagnosis:   Testicular torsion, epididymitis, orchitis, among others                                      Clinical Impression:  Final diagnoses:  [N50.812] Testicular pain, left  [I16.0] Hypertensive urgency (Primary)  [R30.0] Dysuria  [N50.819, G89.18] Pain in testicle after procedure          ED Disposition Condition    Discharge Stable          ED Prescriptions       Medication Sig Dispense Start Date End Date Auth. Provider    ciprofloxacin HCl (CIPRO) 500 MG tablet Take 1 tablet (500 mg total) by mouth 2 (two) times daily. for 10 days 20 tablet 6/11/2024 6/21/2024 Asa Seymour MD    HYDROcodone-acetaminophen (NORCO) 5-325 mg per tablet Take 1 tablet by mouth every 6 (six) hours as needed for Pain. 10 tablet 6/11/2024 -- Asa Seymour MD          Follow-up Information       Follow up With Specialties Details Why Contact Info    [facility] Emergency Medicine  If symptoms worsen [facility] [id number] [phone]    [facility] Urology Schedule an appointment as soon as possible for a visit in 2 weeks  [facility] [id number] [phone]             Asa Seymour MD  06/11/24 4795

## 2024-06-21 DIAGNOSIS — B20 HIV DISEASE: ICD-10-CM

## 2024-06-21 NOTE — TELEPHONE ENCOUNTER
----- Message from Crystal Faith sent at 6/21/2024  7:07 AM CDT -----  Regarding: refills  MAGALY galo/[facility] calling to request refills of:     -mupirocin (BACTROBAN) 2 % ointment - - 4/24/2024 - --  Sig: APPLY TOPICALLY TO THE AFFECTED AREA TWICE DAILY FOR 7 DAYS    - clonazePAM (KLONOPIN) 1 MG tablet - - 10/17/2022 - --  Sig - Route: Take 1 mg by mouth once daily. - Oral  Class: Historical Med    - ondansetron (ZOFRAN-ODT) 8 MG TbDL (Discontinued) - -  9/27/2022    - doravirine (PIFELTRO) 100 mg Tab 30 tablet 4 5/28/2024 - No  Sig - Route: Take 1 tablet (100 mg total) by mouth Daily. - Oral    - dextroamphetamine-amphetamine (ADDERALL XR) 30 MG 24 hr capsule (Discontinued)    Ph: [phone]  Fax: [phone]

## 2024-06-25 RX ORDER — DORAVIRINE 100 MG/1
100 TABLET, FILM COATED ORAL DAILY
Qty: 30 TABLET | Refills: 4 | Status: SHIPPED | OUTPATIENT
Start: 2024-06-25

## 2024-06-25 NOTE — TELEPHONE ENCOUNTER
Екатерина Clement sent to [facility] Infectious Disease Clinical Support Staff  Caller: Unspecified (4 days ago,  3:45 PM)  Pt of Keith      Pt called back stating he's returning Frances call.    Pt requesting a call back.    Pt number [phone]    Please advise    Phoned patient. No answer. Message left on voice mail to contact clinic.

## 2024-06-25 NOTE — TELEPHONE ENCOUNTER
Phoned patient. Questioned if he requested to change pharmacy form [facility] to [facility]. Confirmed that he did request to change. Phoned [facility] back. Spoke with Vanessa. Informed of reason for the call. Was transferred to a pharmacist representative. States only needs a printed rx of the Pifeltro faxed to number left earlier. Currently has a Descovy rx with refills on file. Rifeltro proposed to provider, Mary Ann Umanzor NP. Please print for fax.

## 2024-06-25 NOTE — TELEPHONE ENCOUNTER
Екатерина Clement sent to [facility] Infectious Disease Clinical Support Staff  Caller: Unspecified (Today,  1:06 PM)  Pt of Mary Ann    Pharmacist from [facility] calling to request refills on pt medication.    -mupirocin (BACTROBAN) 2 % ointment--4/24/2024---  Sig: APPLY TOPICALLY TO THE AFFECTED AREA TWICE DAILY FOR 7 DAYS     - clonazePAM (KLONOPIN) 1 MG tablet--10/17/2022---  Sig - Route: Take 1 mg by mouth once daily. - Oral  Class: Historical Med     - ondansetron (ZOFRAN-ODT) 8 MG TbDL (Discontinued)  -           -                       9/27/2022     - doravirine (PIFELTRO) 100 mg Tab30 [id number]/28/2024-No  Sig - Route: Take 1 tablet (100 mg total) by mouth Daily. - Oral     - dextroamphetamine-amphetamine (ADDERALL XR) 30 MG 24 hr capsule (Discontinued)       Ph: [phone]  Fax: [phone]    Phoned number listed above. Spoke with Joan with [facility] pharmacy. States patient is new and is currently being onboarded and was requesting medication refills. Informed that I would need to verify that the patient requested a new pharmacy. To contact [facility] pharmacy once verified by patient. Voiced understanding.

## 2024-07-10 ENCOUNTER — TELEPHONE (OUTPATIENT)
Dept: INFECTIOUS DISEASES | Facility: CLINIC | Age: 44
End: 2024-07-10
Payer: MEDICARE

## 2024-07-10 DIAGNOSIS — B20 HIV DISEASE: ICD-10-CM

## 2024-07-10 RX ORDER — EMTRICITABINE AND TENOFOVIR ALAFENAMIDE 200; 25 MG/1; MG/1
1 TABLET ORAL DAILY
Qty: 90 TABLET | Refills: 1 | Status: SHIPPED | OUTPATIENT
Start: 2024-07-10

## 2024-07-10 NOTE — TELEPHONE ENCOUNTER
Patient requests pharmacy change. Phoned [facility]. Informed that provider, Mary Ann Umanzor NP, Prescribes the Descovy rx and all other prescriptions can be transferred possibly from [facility] or PCP provider. Voiced understanding.     Last visit with Mary Ann Umanzor, APRN: 5/28/2024  Last visit in [facility] INFECTIOUS DISEASE: 5/28/2024    Patient's next visit in [facility] INFECTIOUS DISEASE: 9/17/2024     LL 03/28/2024    Please advise on medication refills

## 2024-07-10 NOTE — TELEPHONE ENCOUNTER
----- Message from Екатерина Clement sent at 7/9/2024  3:02 PM CDT -----  Pt of Mary Ann      Pharmacist from [facility] called requesting refills on medication ADAROL,NEXIUM 40 mg capsule,clonazePAM,mupirocin, and emtricitabine-tenofovir alafen.    Prescription is to be sent at [facility], [place] [id number] [place] [id number] [facility] [address]  Phone: [phone] Fax: [phone]        Please advise

## 2024-09-19 DIAGNOSIS — K31.84 GASTROPARESIS: ICD-10-CM

## 2024-09-20 RX ORDER — METOCLOPRAMIDE 10 MG/1
TABLET ORAL
Qty: 60 TABLET | Refills: 0 | Status: SHIPPED | OUTPATIENT
Start: 2024-09-20

## 2024-10-10 ENCOUNTER — HOSPITAL ENCOUNTER (EMERGENCY)
Facility: HOSPITAL | Age: 44
Discharge: HOME | End: 2024-10-10
Attending: EMERGENCY MEDICINE
Payer: MEDICARE

## 2024-10-10 VITALS
WEIGHT: 138.44 LBS | RESPIRATION RATE: 22 BRPM | HEIGHT: 72 IN | HEART RATE: 116 BPM | BODY MASS INDEX: 18.75 KG/M2 | TEMPERATURE: 98 F | OXYGEN SATURATION: 96 % | DIASTOLIC BLOOD PRESSURE: 81 MMHG | SYSTOLIC BLOOD PRESSURE: 143 MMHG

## 2024-10-10 DIAGNOSIS — L03.211 CELLULITIS, FACE: Primary | ICD-10-CM

## 2024-10-10 PROCEDURE — 99283 EMERGENCY DEPT VISIT LOW MDM: CPT

## 2024-10-10 PROCEDURE — 25000003 PHARM REV CODE 250: Performed by: EMERGENCY MEDICINE

## 2024-10-10 RX ORDER — SULFAMETHOXAZOLE AND TRIMETHOPRIM 800; 160 MG/1; MG/1
TABLET ORAL
Status: DISPENSED
Start: 2024-10-10 | End: 2024-10-11

## 2024-10-10 RX ORDER — SULFAMETHOXAZOLE AND TRIMETHOPRIM 800; 160 MG/1; MG/1
2 TABLET ORAL 2 TIMES DAILY
Qty: 20 TABLET | Refills: 0 | Status: SHIPPED | OUTPATIENT
Start: 2024-10-10 | End: 2024-10-15

## 2024-10-10 RX ORDER — SULFAMETHOXAZOLE AND TRIMETHOPRIM 800; 160 MG/1; MG/1
2 TABLET ORAL
Status: COMPLETED | OUTPATIENT
Start: 2024-10-10 | End: 2024-10-10

## 2024-10-10 RX ADMIN — SULFAMETHOXAZOLE AND TRIMETHOPRIM 2 TABLET: 800; 160 TABLET ORAL at 10:10

## 2024-10-11 NOTE — ED PROVIDER NOTES
Encounter Date: 10/10/2024       History     Chief Complaint   Patient presents with    Wound Infection     Pt reports infection to left side of face, finished abx from [facility], still red and puss came out today.     Small infected area, left cheek, apparently recently abscessed and drained by himself by report, was treated with a course of doxycycline or clindamycin a few weeks ago.  Now some recurrent redness and tenderness in the same area for the last few days, has been squeezing it.  He still has less over mupirocin from before but has not been using.  HIV patient, has follow-up scheduled next week.  No other complaints.    The history is provided by the patient. No  was used.     Review of patient's allergies indicates:   Allergen Reactions    Lorazepam Other (See Comments) and Anxiety     Twitching, involuntary movements.   Other reaction(s): violent twitching, increased anxiety  Loose time, twitching, and he doesn't remember anything.      Adhesive      Past Medical History:   Diagnosis Date    ADHD (attention deficit hyperactivity disorder)     Anemia     Gastroparesis     HIV (human immunodeficiency virus infection)      Past Surgical History:   Procedure Laterality Date    FRACTURE SURGERY  1997    Left arm broken    gastric pacemaker      HEMORRHOID SURGERY      MEDIPORT INSERTION, SINGLE Left     ORCHIOPEXY Left 5/7/2024    Procedure: ORCHIOPEXY;  Surgeon: Marli Jade DO;  Location: [facility];  Service: Urology;  Laterality: Left;     Family History   Problem Relation Name Age of Onset    COPD Mother Nia     Diabetes Mother Nia     No Known Problems Father       Social History     Tobacco Use    Smoking status: Light Smoker     Current packs/day: 0.25     Average packs/day: 0.3 packs/day for 56.8 years (14.2 ttl pk-yrs)     Types: Cigarettes     Start date: 1988    Smokeless tobacco: Never   Substance Use Topics    Alcohol use: Not Currently     Comment: social    Drug  use: Not Currently     Types: Methamphetamines     Review of Systems   Skin:  Positive for wound.       Physical Exam     Initial Vitals [10/10/24 2142]   BP Pulse Resp Temp SpO2   (!) 143/81 (!) 116 (!) 22 97.5 °F (36.4 °C) 96 %      MAP       --         Physical Exam    Nursing note and vitals reviewed.  Constitutional: He appears well-developed and well-nourished.   HENT:   Head: Normocephalic and atraumatic.   Cardiovascular:  Normal rate and regular rhythm.           Pulmonary/Chest: Breath sounds normal. No respiratory distress.     Neurological: He is alert and oriented to person, place, and time.   Skin:   Irregular patch measuring about 1 x 1.5 cm, left cheek medial aspect consistent with likely drained small cutaneous abscess with mild surrounding cellulitis.  No significant induration.  No fluctuance.  No other findings.         ED Course   Procedures  Labs Reviewed - No data to display       Imaging Results    None          Medications   sulfamethoxazole-trimethoprim 800-160mg per tablet 2 tablet (2 tablets Oral Given 10/10/24 2206)     Medical Decision Making  Recurrent minor cellulitis, left cheek.  Uncomplicated but does have an HIV history and was recently on antibiotics for infection in the same area a few weeks ago.    Problems Addressed:  Cellulitis, face: acute illness or injury    Risk  Prescription drug management.      Additional MDM:   Differential Diagnosis:   Cellulitis, abscess, herpes simplex among others                                    Clinical Impression:  Final diagnoses:  [L03.211] Cellulitis, face (Primary)          ED Disposition Condition    Discharge Stable          ED Prescriptions       Medication Sig Dispense Start Date End Date Auth. Provider    sulfamethoxazole-trimethoprim 800-160mg (BACTRIM DS) 800-160 mg Tab Take 2 tablets by mouth 2 (two) times daily. for 5 days 20 tablet 10/10/2024 10/15/2024 Gabriel Kauffman MD          Follow-up Information       Follow up With  Specialties Details Why Contact Info    [facility] Emergency Medicine  As needed [address] [facility] [id number] [phone]             Gabriel Kauffman MD  10/10/24 6113

## 2024-10-17 ENCOUNTER — LAB VISIT (OUTPATIENT)
Dept: LAB | Facility: HOSPITAL | Age: 44
End: 2024-10-17
Attending: NURSE PRACTITIONER
Payer: MEDICARE

## 2024-10-17 ENCOUNTER — OFFICE VISIT (OUTPATIENT)
Dept: INFECTIOUS DISEASES | Facility: CLINIC | Age: 44
End: 2024-10-17
Payer: MEDICARE

## 2024-10-17 VITALS
WEIGHT: 138.56 LBS | TEMPERATURE: 98 F | BODY MASS INDEX: 18.77 KG/M2 | DIASTOLIC BLOOD PRESSURE: 84 MMHG | HEIGHT: 72 IN | HEART RATE: 107 BPM | RESPIRATION RATE: 12 BRPM | SYSTOLIC BLOOD PRESSURE: 130 MMHG

## 2024-10-17 DIAGNOSIS — B20 HIV DISEASE: ICD-10-CM

## 2024-10-17 DIAGNOSIS — F41.9 ANXIETY DISORDER, UNSPECIFIED: ICD-10-CM

## 2024-10-17 DIAGNOSIS — E55.9 VITAMIN D DEFICIENCY: ICD-10-CM

## 2024-10-17 DIAGNOSIS — R79.9 ABNORMAL FINDING OF BLOOD CHEMISTRY, UNSPECIFIED: ICD-10-CM

## 2024-10-17 DIAGNOSIS — B20 HIV DISEASE: Primary | ICD-10-CM

## 2024-10-17 DIAGNOSIS — K31.84 GASTROPARESIS: ICD-10-CM

## 2024-10-17 DIAGNOSIS — Z86.19 HISTORY OF HEPATITIS C: ICD-10-CM

## 2024-10-17 DIAGNOSIS — Z86.19 HISTORY OF SYPHILIS: ICD-10-CM

## 2024-10-17 DIAGNOSIS — Z23 NEED FOR VACCINATION: ICD-10-CM

## 2024-10-17 DIAGNOSIS — R68.89 OTHER GENERAL SYMPTOMS AND SIGNS: ICD-10-CM

## 2024-10-17 LAB
25(OH)D3+25(OH)D2 SERPL-MCNC: 32 NG/ML (ref 30–80)
ALBUMIN SERPL-MCNC: 4.1 G/DL (ref 3.5–5)
ALBUMIN/GLOB SERPL: 1.2 RATIO (ref 1.1–2)
ALP SERPL-CCNC: 77 UNIT/L (ref 40–150)
ALT SERPL-CCNC: 12 UNIT/L (ref 0–55)
ANION GAP SERPL CALC-SCNC: 7 MEQ/L
AST SERPL-CCNC: 18 UNIT/L (ref 5–34)
BACTERIA #/AREA URNS AUTO: ABNORMAL /HPF
BASOPHILS # BLD AUTO: 0.07 X10(3)/MCL
BASOPHILS NFR BLD AUTO: 0.8 %
BILIRUB SERPL-MCNC: 0.3 MG/DL
BILIRUB UR QL STRIP.AUTO: NEGATIVE
BUN SERPL-MCNC: 16 MG/DL (ref 8.9–20.6)
C TRACH DNA SPEC QL NAA+PROBE: NOT DETECTED
CALCIUM SERPL-MCNC: 10.3 MG/DL (ref 8.4–10.2)
CHLORIDE SERPL-SCNC: 103 MMOL/L (ref 98–107)
CHOLEST SERPL-MCNC: 190 MG/DL
CHOLEST/HDLC SERPL: 4 {RATIO} (ref 0–5)
CLARITY UR: CLEAR
CO2 SERPL-SCNC: 28 MMOL/L (ref 22–29)
COLOR UR AUTO: YELLOW
CREAT SERPL-MCNC: 1.08 MG/DL (ref 0.73–1.18)
CREAT/UREA NIT SERPL: 15
EOSINOPHIL # BLD AUTO: 0.37 X10(3)/MCL (ref 0–0.9)
EOSINOPHIL NFR BLD AUTO: 4 %
ERYTHROCYTE [DISTWIDTH] IN BLOOD BY AUTOMATED COUNT: 15.8 % (ref 11.5–17)
EST. AVERAGE GLUCOSE BLD GHB EST-MCNC: 102.5 MG/DL
GFR SERPLBLD CREATININE-BSD FMLA CKD-EPI: >60 ML/MIN/1.73/M2
GLOBULIN SER-MCNC: 3.5 GM/DL (ref 2.4–3.5)
GLUCOSE SERPL-MCNC: 67 MG/DL (ref 74–100)
GLUCOSE UR QL STRIP: NORMAL
HAV AB SER QL IA: NONREACTIVE
HBA1C MFR BLD: 5.2 %
HBV CORE AB SERPL QL IA: REACTIVE
HBV SURFACE AB SER-ACNC: 41.87 MIU/ML
HBV SURFACE AB SERPL IA-ACNC: REACTIVE M[IU]/ML
HBV SURFACE AG SERPL QL IA: NONREACTIVE
HCT VFR BLD AUTO: 43.4 % (ref 42–52)
HCV RNA SERPL NAA+PROBE-LOG IU: NOT DETECTED {LOG_IU}/ML
HDLC SERPL-MCNC: 50 MG/DL (ref 35–60)
HGB BLD-MCNC: 14.1 G/DL (ref 14–18)
HGB UR QL STRIP: NEGATIVE
HYALINE CASTS #/AREA URNS LPF: ABNORMAL /LPF
IMM GRANULOCYTES # BLD AUTO: 0.04 X10(3)/MCL (ref 0–0.04)
IMM GRANULOCYTES NFR BLD AUTO: 0.4 %
KETONES UR QL STRIP: NEGATIVE
LDLC SERPL CALC-MCNC: 110 MG/DL (ref 50–140)
LEUKOCYTE ESTERASE UR QL STRIP: NEGATIVE
LYMPHOCYTES # BLD AUTO: 3.42 X10(3)/MCL (ref 0.6–4.6)
LYMPHOCYTES NFR BLD AUTO: 36.9 %
MCH RBC QN AUTO: 29.6 PG (ref 27–31)
MCHC RBC AUTO-ENTMCNC: 32.5 G/DL (ref 33–36)
MCV RBC AUTO: 91 FL (ref 80–94)
MONOCYTES # BLD AUTO: 0.63 X10(3)/MCL (ref 0.1–1.3)
MONOCYTES NFR BLD AUTO: 6.8 %
MUCOUS THREADS URNS QL MICRO: ABNORMAL /LPF
N GONORRHOEA DNA SPEC QL NAA+PROBE: NOT DETECTED
NEUTROPHILS # BLD AUTO: 4.75 X10(3)/MCL (ref 2.1–9.2)
NEUTROPHILS NFR BLD AUTO: 51.1 %
NITRITE UR QL STRIP: NEGATIVE
NRBC BLD AUTO-RTO: 0 %
PH UR STRIP: 5.5 [PH]
PLATELET # BLD AUTO: 320 X10(3)/MCL (ref 130–400)
PMV BLD AUTO: 9.6 FL (ref 7.4–10.4)
POTASSIUM SERPL-SCNC: 4.4 MMOL/L (ref 3.5–5.1)
PROT SERPL-MCNC: 7.6 GM/DL (ref 6.4–8.3)
PROT UR QL STRIP: ABNORMAL
RBC # BLD AUTO: 4.77 X10(6)/MCL (ref 4.7–6.1)
RBC #/AREA URNS AUTO: ABNORMAL /HPF
SODIUM SERPL-SCNC: 138 MMOL/L (ref 136–145)
SOURCE (OHS): NORMAL
SP GR UR STRIP.AUTO: 1.03 (ref 1–1.03)
SQUAMOUS #/AREA URNS LPF: ABNORMAL /HPF
T PALLIDUM AB SER QL: REACTIVE
TRIGL SERPL-MCNC: 150 MG/DL (ref 34–140)
TSH SERPL-ACNC: 0.9 UIU/ML (ref 0.35–4.94)
UROBILINOGEN UR STRIP-ACNC: ABNORMAL
VLDLC SERPL CALC-MCNC: 30 MG/DL
WBC # BLD AUTO: 9.28 X10(3)/MCL (ref 4.5–11.5)
WBC #/AREA URNS AUTO: ABNORMAL /HPF

## 2024-10-17 PROCEDURE — 85025 COMPLETE CBC W/AUTO DIFF WBC: CPT

## 2024-10-17 PROCEDURE — 87591 N.GONORRHOEAE DNA AMP PROB: CPT | Performed by: NURSE PRACTITIONER

## 2024-10-17 PROCEDURE — G0008 ADMIN INFLUENZA VIRUS VAC: HCPCS | Mod: PBBFAC

## 2024-10-17 PROCEDURE — 86704 HEP B CORE ANTIBODY TOTAL: CPT

## 2024-10-17 PROCEDURE — 86480 TB TEST CELL IMMUN MEASURE: CPT

## 2024-10-17 PROCEDURE — 80053 COMPREHEN METABOLIC PANEL: CPT

## 2024-10-17 PROCEDURE — 84443 ASSAY THYROID STIM HORMONE: CPT

## 2024-10-17 PROCEDURE — 81001 URINALYSIS AUTO W/SCOPE: CPT | Performed by: NURSE PRACTITIONER

## 2024-10-17 PROCEDURE — 86708 HEPATITIS A ANTIBODY: CPT

## 2024-10-17 PROCEDURE — 86706 HEP B SURFACE ANTIBODY: CPT

## 2024-10-17 PROCEDURE — 86780 TREPONEMA PALLIDUM: CPT

## 2024-10-17 PROCEDURE — 87340 HEPATITIS B SURFACE AG IA: CPT

## 2024-10-17 PROCEDURE — 90656 IIV3 VACC NO PRSV 0.5 ML IM: CPT | Mod: PBBFAC

## 2024-10-17 PROCEDURE — 86592 SYPHILIS TEST NON-TREP QUAL: CPT

## 2024-10-17 PROCEDURE — 87536 HIV-1 QUANT&REVRSE TRNSCRPJ: CPT

## 2024-10-17 PROCEDURE — 80061 LIPID PANEL: CPT

## 2024-10-17 PROCEDURE — 87491 CHLMYD TRACH DNA AMP PROBE: CPT | Performed by: NURSE PRACTITIONER

## 2024-10-17 PROCEDURE — 82306 VITAMIN D 25 HYDROXY: CPT

## 2024-10-17 PROCEDURE — 36415 COLL VENOUS BLD VENIPUNCTURE: CPT

## 2024-10-17 PROCEDURE — 87522 HEPATITIS C REVRS TRNSCRPJ: CPT

## 2024-10-17 PROCEDURE — 99214 OFFICE O/P EST MOD 30 MIN: CPT | Mod: PBBFAC,25 | Performed by: NURSE PRACTITIONER

## 2024-10-17 PROCEDURE — 86360 T CELL ABSOLUTE COUNT/RATIO: CPT

## 2024-10-17 PROCEDURE — 83036 HEMOGLOBIN GLYCOSYLATED A1C: CPT

## 2024-10-17 RX ORDER — TRIAMCINOLONE ACETONIDE 1 MG/G
CREAM TOPICAL
COMMUNITY
Start: 2024-06-20

## 2024-10-17 RX ORDER — DORAVIRINE 100 MG/1
100 TABLET, FILM COATED ORAL DAILY
Qty: 30 TABLET | Refills: 4 | Status: SHIPPED | OUTPATIENT
Start: 2024-10-17

## 2024-10-17 RX ORDER — METOCLOPRAMIDE 10 MG/1
10 TABLET ORAL EVERY 6 HOURS PRN
Qty: 60 TABLET | Refills: 4 | Status: SHIPPED | OUTPATIENT
Start: 2024-10-17

## 2024-10-17 RX ORDER — EMTRICITABINE AND TENOFOVIR ALAFENAMIDE 200; 25 MG/1; MG/1
1 TABLET ORAL DAILY
Qty: 30 TABLET | Refills: 4 | Status: SHIPPED | OUTPATIENT
Start: 2024-10-17

## 2024-10-17 RX ADMIN — INFLUENZA VIRUS VACCINE 0.5 ML: 15; 15; 15 SUSPENSION INTRAMUSCULAR at 10:10

## 2024-10-17 NOTE — PROGRESS NOTES
Patient ID: Claude Hall 44 y.o.     Chief Complaint:   Chief Complaint   Patient presents with    Followup HIV        HPI:    10/17/24  RAUL is a 43 yo WM here today for HIV f/u visit.  He is taking Descovy & Pifeltro daily and tolerates it well.  Due for repeat labs, has resumed approximately 6 months now since treatment interruption due to insurance concerns. He has been treated in ED x 2 in recent weeks for facial abscess, clearing with completion of Bactrim DS 5 day course. Hand hygiene and not touching face throughout the day encouraged. Denies any sexual encounters since last visit. Appreciates flu vaccine today. Due for routine annual labs, amenable to same today. All questions answered & concerns addressed.    5/28/24  RAUL is a 42 yo WM evaluated today via audio-video virtual visit for f/u. He did have a treatment interruption in medications due to insurance issues. Last labs 3/28/24 VL [address], CD4 1071. RPR titer decreased to 0 dils. Denies any sexual encounters with new partners. He has been back on Descovy & Pifeltro x 6-8 weeks now, will repeat labs. 3/28/24 virus was pan-sensitive. He tells me that he and Manas have split up after 14 years due to stressors of having family member living with them.  He is currently staying in [place] and will have fasting labs collected there. He is healing well from the orchiopexy and still followed by Dr. Jade. He has no questions or concerns today.      3/26/24  RAUL is a 42 yo WM evaluated today via audio-video virtual visit for f/u. He states that he did resume Descovy & Pifeltro after last visit, but ran out again about 2 weeks ago. He is having trouble with getting medications filled as disability was reversed and Medicare insurance was not paid. He is covered by Medicaid, but they will not cover medications because their records states that he does have Medicare. Disability has been reinstated but Medicare still not covering medications per pharmacy. He  has been working diligently with disability office regarding this and is [place] today regarding same. Will come in tomorrow for labs, states that he must have forgotten to come in for labs after last visit. Will reassess RPR titer as well, denies any new partners. He did attend visit with Dr. Jade and is planning surgery early summer to have testicle tacked. Stressed importance of having medications resumed asap to get back to viral suppression preoperatively. Voiced understanding & appreciation.            Past Medical History:   Diagnosis Date    ADHD (attention deficit hyperactivity disorder)     Anemia     Gastroparesis     HIV (human immunodeficiency virus infection)         Past Surgical History:   Procedure Laterality Date    FRACTURE SURGERY  1997    Left arm broken    gastric pacemaker      HEMORRHOID SURGERY      MEDIPORT INSERTION, SINGLE Left     ORCHIOPEXY Left 5/7/2024    Procedure: ORCHIOPEXY;  Surgeon: Marli Jade DO;  Location: [facility];  Service: Urology;  Laterality: Left;        Social History     Socioeconomic History    Marital status: Single   Tobacco Use    Smoking status: Light Smoker     Current packs/day: 0.25     Average packs/day: 0.3 packs/day for 56.8 years (14.2 ttl pk-yrs)     Types: Cigarettes     Start date: 1988    Smokeless tobacco: Never    Tobacco comments:     Starting the process of quitting again.   Substance and Sexual Activity    Alcohol use: Not Currently     Comment: social    Drug use: Not Currently     Types: Methamphetamines    Sexual activity: Yes     Partners: Male     Birth control/protection: None     Comment: Partner on Prep        Family History   Problem Relation Name Age of Onset    COPD Mother Nia     Diabetes Mother Nia     No Known Problems Father          Review of patient's allergies indicates:   Allergen Reactions    Lorazepam Other (See Comments) and Anxiety     Twitching, involuntary movements.   Other reaction(s): violent twitching,  increased anxiety  Loose time, twitching, and he doesn't remember anything.      Adhesive         Immunization History   Administered Date(s) Administered    COVID-19 Vaccine 08/16/2021    COVID-19, MRNA, LN-S, PF (Pfizer) (Purple Cap) 03/27/2021, 04/17/2021    HPV 9-Valent 04/08/2019, 07/08/2019, 11/11/2019    Hepatitis A 07/25/2011    Hepatitis A, Adult 08/01/2012    Hepatitis B, Adult 04/01/2011    Influenza 12/11/2013    Influenza - Quadrivalent 10/13/2010, 10/20/2016, 10/30/2017, 11/11/2019, 09/26/2020, 11/18/2021    Influenza - Quadrivalent - PF (6-35 months) 10/30/2017, 11/15/2018    Influenza - Quadrivalent - PF *Preferred* (6 months and older) 10/13/2010, 10/30/2017, 11/11/2019, 09/26/2020, 10/06/2022    Influenza - Trivalent - Afluria, Fluzone MDV 10/13/2010    Influenza - Trivalent - Fluarix, Flulaval, Fluzone, Afluria - PF 10/13/2010, 12/01/2013, 10/13/2014, 12/23/2015, 10/20/2016, 10/30/2017, 10/17/2024    Meningococcal Conjugate (MCV4P) 11/15/2018, 04/08/2019    Pneumococcal Conjugate - 13 Valent 10/13/2014    Pneumococcal Conjugate - 7 Valent 02/25/2008    Pneumococcal Polysaccharide - 23 Valent 03/01/2013, 02/27/2018    Td (ADULT) 07/21/2014    Td (Adult), Unspecified Formulation 07/21/2014    Td - PF (ADULT) 07/21/2014        Review of Systems   Constitutional: Negative.    HENT: Negative.     Eyes: Negative.    Respiratory: Negative.     Cardiovascular: Negative.    Gastrointestinal: Negative.    Genitourinary: Negative.    Musculoskeletal: Negative.    Skin: Negative.    Neurological: Negative.    Endo/Heme/Allergies: Negative.    Psychiatric/Behavioral: Negative.     All other systems reviewed and are negative.         Objective:      /84 (BP Location: Right arm, Patient Position: Sitting)   Pulse 107   Temp 98 °F (36.7 °C) (Oral)   Resp 12   Ht 6' (1.829 m)   Wt 62.9 kg (138 lb 9 oz)   BMI 18.79 kg/m²      Physical Exam  Vitals reviewed.   Constitutional:       General: He is not in  acute distress.     Appearance: Normal appearance. He is not toxic-appearing.   HENT:      Mouth/Throat:      Mouth: Mucous membranes are moist.      Pharynx: Oropharynx is clear.   Eyes:      Conjunctiva/sclera: Conjunctivae normal.   Cardiovascular:      Rate and Rhythm: Normal rate and regular rhythm.   Pulmonary:      Effort: Pulmonary effort is normal. No respiratory distress.      Breath sounds: Normal breath sounds.   Abdominal:      General: Abdomen is flat. Bowel sounds are normal.      Palpations: Abdomen is soft.   Musculoskeletal:         General: Normal range of motion.      Cervical back: Normal range of motion.   Lymphadenopathy:      Cervical: No cervical adenopathy.   Skin:     General: Skin is warm and dry.   Neurological:      General: No focal deficit present.      Mental Status: He is alert and oriented to person, place, and time. Mental status is at baseline.   Psychiatric:         Mood and Affect: Mood normal.         Behavior: Behavior normal.          Labs:   Lab Results   Component Value Date    WBC 7.89 06/11/2024    HGB 14.0 06/11/2024    HCT 42.4 06/11/2024    MCV 86.7 06/11/2024     06/11/2024       CMP  Sodium   Date Value Ref Range Status   06/11/2024 139 136 - 145 mmol/L Final   03/23/2017 142 136 - 145 mmol/L Final     Potassium   Date Value Ref Range Status   06/11/2024 4.9 3.5 - 5.1 mmol/L Final   03/23/2017 3.7 3.5 - 5.1 mmol/L Final     Chloride   Date Value Ref Range Status   06/11/2024 106 98 - 107 mmol/L Final   03/23/2017 102 95 - 110 mmol/L Final     CO2   Date Value Ref Range Status   06/11/2024 21 (L) 22 - 29 mmol/L Final   03/23/2017 25 23 - 29 mmol/L Final     Glucose   Date Value Ref Range Status   03/23/2017 113 (H) 70 - 110 mg/dL Final     BUN   Date Value Ref Range Status   03/23/2017 12 6 - 20 mg/dL Final     Blood Urea Nitrogen   Date Value Ref Range Status   06/11/2024 9.9 8.9 - 20.6 mg/dL Final     Creatinine   Date Value Ref Range Status   06/11/2024  1.09 0.73 - 1.18 mg/dL Final   03/23/2017 1.0 0.5 - 1.4 mg/dL Final     Calcium   Date Value Ref Range Status   06/11/2024 10.4 (H) 8.4 - 10.2 mg/dL Final   03/23/2017 10.0 8.7 - 10.5 mg/dL Final     Total Protein   Date Value Ref Range Status   03/23/2017 7.9 6.0 - 8.4 g/dL Final     Albumin   Date Value Ref Range Status   06/11/2024 4.4 3.5 - 5.0 g/dL Final   03/23/2017 4.4 3.5 - 5.2 g/dL Final     Total Bilirubin   Date Value Ref Range Status   03/23/2017 0.4 0.1 - 1.0 mg/dL Final     Comment:     For infants and newborns, interpretation of results should be based  on gestational age, weight and in agreement with clinical  observations.  Premature Infant recommended reference ranges:  Up to 24 hours.............<8.0 mg/dL  Up to 48 hours............<12.0 mg/dL  3-5 days..................<15.0 mg/dL  6-29 days.................<15.0 mg/dL       Bilirubin Total   Date Value Ref Range Status   06/11/2024 0.4 <=1.5 mg/dL Final     Alkaline Phosphatase   Date Value Ref Range Status   03/23/2017 79 55 - 135 U/L Final     ALP   Date Value Ref Range Status   06/11/2024 83 40 - 150 unit/L Final     AST   Date Value Ref Range Status   06/11/2024 25 5 - 34 unit/L Final   03/23/2017 19 10 - 40 U/L Final     ALT   Date Value Ref Range Status   06/11/2024 13 0 - 55 unit/L Final   03/23/2017 19 10 - 44 U/L Final     Anion Gap   Date Value Ref Range Status   03/23/2017 15 8 - 16 mmol/L Final     eGFR   Date Value Ref Range Status   06/11/2024 >60 mL/min/1.73/m2 Final     Lab Results   Component Value Date    TSH 0.218 (L) 02/07/2023     Hep C Ab Interp   Date Value Ref Range Status   11/18/2021 Reactive (A) >Nonreactive Final     Syphilis Antibody   Date Value Ref Range Status   03/28/2024 Reactive (A) Nonreactive, Equivocal Final     RPR   Date Value Ref Range Status   03/28/2024 Reactive (A) Non-Reactive Final     RPR Titer   Date Value Ref Range Status   03/28/2024 0 dils (A) (none) Final     Cholesterol Total   Date Value Ref  Range Status   02/07/2023 225 (H) <=200 mg/dL Final     HDL Cholesterol   Date Value Ref Range Status   02/07/2023 60 35 - 60 mg/dL Final     Triglyceride   Date Value Ref Range Status   02/07/2023 225 (H) 34 - 140 mg/dL Final     Cholesterol/HDL Ratio   Date Value Ref Range Status   02/07/2023 4 0 - 5 Final     Very Low Density Lipoprotein   Date Value Ref Range Status   02/07/2023 45  Final     LDL Cholesterol   Date Value Ref Range Status   02/07/2023 120.00 50.00 - 140.00 mg/dL Final     Vitamin D   Date Value Ref Range Status   02/07/2023 42.9 30.0 - 80.0 ng/mL Final     Results for orders placed or performed in visit on 10/06/22   CD4 Lymphocytes   Result Value Ref Range    Patient Age 42     WBC Absolute 7,300 4,500 - 11,500 /mm3    Lymph Percent 32.2 28 - 48 %    Lymph Absolute 2,350.6 1,260 - 5,520 x10(3)/mcL    CD4 % 34.8 %    CD4 Absolute 818.0088 589 - 1,505 unit/L    T Cell Interp       Normal total lymphocyte absolute count and normal percentage.  Normal CD4+ T helper cell absolute count and normal percentage.    Glendy Mike MD       Results for orders placed or performed in visit on 03/28/24   HIV-1 RNA, Quantitative, PCR with Reflex to Genotype   Result Value Ref Range    HIV-1 RNA Detect/Quant, P 58144 (A) Undetected copies/mL     Results for orders placed or performed in visit on 02/07/23   Quantiferon Gold TB   Result Value Ref Range    QuantiFERON-Tb Gold Plus Result Negative Negative    TB1 Ag minus Nil Result 0.02 IU/mL    TB2 Ag minus Nil Result 0.05 IU/mL    Mitogen minus Nil Result >10.00 IU/mL    Nil Result 0.01 IU/mL     Results for orders placed or performed in visit on 07/18/23   C.trach/N.gonor AMP RNA    Specimen: Throat   Result Value Ref Range    Chlamydia trachomatis amplified RNA Negative Negative    Neisseria gonorrhoeae amplified RNA Negative Negative    Source SEE COMMENTS     SOURCE: SEE COMMENTS      Results for orders placed or performed in visit on 02/07/23    Urinalysis   Result Value Ref Range    Color, UA Yellow Yellow, Light-Yellow, Dark Yellow, Melanie, Straw    Appearance, UA Clear Clear    Specific Gravity, UA 1.034     pH, UA 6.0 5.0 - 8.5    Protein, UA Trace (A) Negative mg/dL    Glucose, UA Normal Negative, Normal mg/dL    Ketones, UA Trace (A) Negative mg/dL    Blood, UA Negative Negative unit/L    Bilirubin, UA Negative Negative mg/dL    Urobilinogen, UA 1+ (A) 0.2, 1.0, Normal mg/dL    Nitrites, UA Negative Negative    Leukocyte Esterase, UA Negative Negative unit/L    WBC, UA 0-5 None Seen, 0-2, 3-5, 0-5 /HPF    Bacteria, UA None Seen None Seen /HPF    Squamous Epithelial Cells, UA Trace (A) None Seen /HPF    Mucous, UA Moderate (A) None Seen /LPF    Hyaline Casts, UA None Seen None Seen /lpf    RBC, UA 0-5 None Seen, 0-2, 3-5, 0-5 /HPF       Imaging: Reviewed most recent relevant imaging studies available, notable results highlighted in this note    Medications:     Current Outpatient Medications   Medication Instructions    emtricitabine-tenofovir alafen (DESCOVY) 200-25 mg Tab 1 tablet, Oral, Daily    metoclopramide HCl (REGLAN) 10 mg, Oral, Every 6 hours PRN    mupirocin (BACTROBAN) 2 % ointment APPLY TOPICALLY TO THE AFFECTED AREA TWICE DAILY FOR 7 DAYS    NexIUM 40 mg, Oral, 2 times daily before meals    PIFELTRO 100 mg, Oral, Daily    promethazine (PHENERGAN) 6.25 mg/5 mL syrup 5 mLs, Every 4 hours PRN    traZODone (DESYREL) 50 mg, Nightly    triamcinolone acetonide 0.1% (KENALOG) 0.1 % cream SMARTSI Application Topical 2-3 Times Daily       Assessment:       Problem List Items Addressed This Visit          Endocrine    Vitamin D deficiency    Relevant Orders    Vitamin D       GI    Gastroparesis    Relevant Medications    metoclopramide HCl (REGLAN) 10 MG tablet    Other Relevant Orders    Hemoglobin A1C     Other Visit Diagnoses       HIV disease    -  Primary    Relevant Medications    doravirine (PIFELTRO) 100 mg Tab     emtricitabine-tenofovir alafen (DESCOVY) 200-25 mg Tab    Other Relevant Orders    Quantiferon Gold TB    TSH    Lipid Panel    Chlamydia/GC, PCR    Comprehensive Metabolic Panel    CBC Auto Differential    CD4 Lymphocytes    HIV-1 RNA, Quantitative, PCR with Reflex to Genotype    Hepatitis A antibody, IgG    Hepatitis B Surface Ab, Qualitative    Urinalysis, Reflex to Urine Culture    History of hepatitis C        Relevant Orders    Hepatitis C RNA, Quantitative, PCR    History of syphilis        Relevant Orders    SYPHILIS ANTIBODY (WITH REFLEX RPR)    Need for vaccination        Relevant Medications    influenza (Flulaval, Fluzone, Fluarix) 45 mcg/0.5 mL IM vaccine (> or = 6 mo) 0.5 mL (Completed)    Abnormal finding of blood chemistry, unspecified        Relevant Orders    Hemoglobin A1C    Lipid Panel    Anxiety disorder, unspecified        Relevant Orders    TSH               Plan:      HIV disease  -     doravirine (PIFELTRO) 100 mg Tab; Take 1 tablet (100 mg total) by mouth Daily.  Dispense: 30 tablet; Refill: 4  -     emtricitabine-tenofovir alafen (DESCOVY) 200-25 mg Tab; Take 1 tablet by mouth once daily.  Dispense: 30 tablet; Refill: 4  -     Quantiferon Gold TB; Future; Expected date: 10/17/2024  -     TSH; Future; Expected date: 10/17/2024  -     Lipid Panel; Future; Expected date: 10/17/2024  -     Chlamydia/GC, PCR  -     Comprehensive Metabolic Panel; Future; Expected date: 10/17/2024  -     CBC Auto Differential; Future; Expected date: 10/17/2024  -     CD4 Lymphocytes; Future; Expected date: 10/17/2024  -     HIV-1 RNA, Quantitative, PCR with Reflex to Genotype; Future; Expected date: 10/17/2024  -     Hepatitis A antibody, IgG; Future; Expected date: 10/17/2024  -     Hepatitis B Surface Ab, Qualitative; Future; Expected date: 10/17/2024  -     Urinalysis, Reflex to Urine Culture  Adherence and sexual health counseling done.  Use condoms for all sexual encounters.  Blood precautions.   Continue  Descovy & Pifeltro daily.  Labs today.  RTC 4 months with tia Reese.     HIV Wellness:  Anal pap: HRA with Biopsy 1/5/22  Oral CT/GC: 12/22 Neg, 7/23 Neg  Anal CT/GC: 12/22 Neg, 7/23 Neg  Urine CT/GC: 12/22 Neg, 7/23 Neg, 10/24  RPR: 2/23 Neg ab, 7/23 128 dils, 1/24 0 dils, 10/24  Ophth: 8/2023, 10/24     History of hepatitis C  -     Hepatitis C RNA, Quantitative, PCR; Future; Expected date: 10/17/2024  HX: Treatment naive.  GT 1a, baseline VL [id number]  Fibrosure 6/28/22 A 0, F 0  FibroScan: not a candidate due to implanted gastric pacer  Completed Mavyret 3 tabs daily x 8 weeks on 10/6/22, started 8/10/22  HCV not detected 9/7/22, week 4 and 10/6/22 end of treatment  Blood & sex precautions: do not share a razor, needle, toothbrush, or clippers with anyone.  Partner tested HCV negative.  HCV VL not detected 2/7/23, 7/2023.  Repeat HCV RNA today.     History of syphilis  -     SYPHILIS ANTIBODY (WITH REFLEX RPR); Future; Expected date: 10/17/2024  Baseline titer 128 dils 7/2023  Treated with Doxycycline 100 mg bid x 14 days due to national shortage of Bicillin at that time.   Repeat titer 0 dils 3/2024.  Repeat with labs as ordered.    Gastroparesis  -     Hemoglobin A1C; Future; Expected date: 10/17/2024  -     metoclopramide HCl (REGLAN) 10 MG tablet; Take 1 tablet (10 mg total) by mouth every 6 (six) hours as needed (nausea and vomiting).  Dispense: 60 tablet; Refill: 4  Relgan as ordered.    Vitamin D deficiency  -     Vitamin D; Future; Expected date: 10/17/2024  Vitamin D level today.     Need for vaccination  -     influenza (Flulaval, Fluzone, Fluarix) 45 mcg/0.5 mL IM vaccine (> or = 6 mo) 0.5 mL  Flu vaccine today.     Abnormal finding of blood chemistry, unspecified  -     Hemoglobin A1C; Future; Expected date: 10/17/2024  -     Lipid Panel; Future; Expected date: 10/17/2024    Anxiety disorder, unspecified  -     TSH; Future; Expected date: 10/17/2024

## 2024-10-17 NOTE — PROGRESS NOTES
Influenza Vaccination given IM right deltoid using aseptic tech. Patient tolerated well. To contact clinic prn.

## 2024-10-18 LAB
CD3 CELLS # BLD: 2408 CELLS/MCL (ref 550–2202)
CD3 CELLS NFR BLD: 75 % (ref 58–86)
CD3+CD4+ CELLS # BLD: 1291 CELLS/MCL (ref 365–1437)
CD3+CD4+ CELLS NFR BLD: 40 % (ref 32–64)
CD3+CD4+ CELLS/CD3+CD8+ CLL BLD: 1.2 %
CD3+CD8+ CELLS # BLD: 1084 CELLS/MCL (ref 145–846)
CD3+CD8+ CELLS NFR BLD: 34 % (ref 13–40)
CD45 CELLS # BLD: 3.23 THOU/MCL (ref 0.82–2.84)
RPR SER QL: NORMAL

## 2024-10-19 LAB
GAMMA INTERFERON BACKGROUND BLD IA-ACNC: 0.09 IU/ML
HIV1 RNA # PLAS NAA DL=20: NORMAL COPIES/ML
M TB IFN-G BLD-IMP: NEGATIVE
M TB IFN-G CD4+ BCKGRND COR BLD-ACNC: -0.02 IU/ML
M TB IFN-G CD4+CD8+ BCKGRND COR BLD-ACNC: -0.05 IU/ML
MITOGEN IGNF BCKGRD COR BLD-ACNC: 9.91 IU/ML

## 2024-10-21 LAB — T PALLIDUM AB SER QL AGGL: POSITIVE

## 2024-12-30 ENCOUNTER — HOSPITAL ENCOUNTER (EMERGENCY)
Facility: HOSPITAL | Age: 44
Discharge: HOME | End: 2024-12-31
Attending: INTERNAL MEDICINE
Payer: MEDICARE

## 2024-12-30 VITALS
RESPIRATION RATE: 18 BRPM | DIASTOLIC BLOOD PRESSURE: 107 MMHG | TEMPERATURE: 98 F | SYSTOLIC BLOOD PRESSURE: 170 MMHG | HEART RATE: 83 BPM | HEIGHT: 72 IN | BODY MASS INDEX: 18.16 KG/M2 | WEIGHT: 134.06 LBS | OXYGEN SATURATION: 97 %

## 2024-12-30 DIAGNOSIS — K31.84 GASTROPARESIS: Primary | ICD-10-CM

## 2024-12-30 LAB
ALBUMIN SERPL-MCNC: 4.1 G/DL (ref 3.5–5)
ALBUMIN/GLOB SERPL: 1.3 RATIO (ref 1.1–2)
ALP SERPL-CCNC: 74 UNIT/L (ref 40–150)
ALT SERPL-CCNC: 16 UNIT/L (ref 0–55)
ANION GAP SERPL CALC-SCNC: 9 MEQ/L
AST SERPL-CCNC: 17 UNIT/L (ref 5–34)
BASOPHILS # BLD AUTO: 0.06 X10(3)/MCL
BASOPHILS NFR BLD AUTO: 0.4 %
BILIRUB SERPL-MCNC: 0.4 MG/DL
BUN SERPL-MCNC: 8.1 MG/DL (ref 8.9–20.6)
CALCIUM SERPL-MCNC: 9.7 MG/DL (ref 8.4–10.2)
CHLORIDE SERPL-SCNC: 105 MMOL/L (ref 98–107)
CO2 SERPL-SCNC: 27 MMOL/L (ref 22–29)
CREAT SERPL-MCNC: 0.97 MG/DL (ref 0.72–1.25)
CREAT/UREA NIT SERPL: 8
EOSINOPHIL # BLD AUTO: 0.38 X10(3)/MCL (ref 0–0.9)
EOSINOPHIL NFR BLD AUTO: 2.8 %
ERYTHROCYTE [DISTWIDTH] IN BLOOD BY AUTOMATED COUNT: 15 % (ref 11.5–17)
GFR SERPLBLD CREATININE-BSD FMLA CKD-EPI: >60 ML/MIN/1.73/M2
GLOBULIN SER-MCNC: 3.1 GM/DL (ref 2.4–3.5)
GLUCOSE SERPL-MCNC: 120 MG/DL (ref 74–100)
HCT VFR BLD AUTO: 44.2 % (ref 42–52)
HGB BLD-MCNC: 14.7 G/DL (ref 14–18)
IMM GRANULOCYTES # BLD AUTO: 0.04 X10(3)/MCL (ref 0–0.04)
IMM GRANULOCYTES NFR BLD AUTO: 0.3 %
LIPASE SERPL-CCNC: 27 U/L
LYMPHOCYTES # BLD AUTO: 3.18 X10(3)/MCL (ref 0.6–4.6)
LYMPHOCYTES NFR BLD AUTO: 23.1 %
MCH RBC QN AUTO: 29.3 PG (ref 27–31)
MCHC RBC AUTO-ENTMCNC: 33.3 G/DL (ref 33–36)
MCV RBC AUTO: 88 FL (ref 80–94)
MONOCYTES # BLD AUTO: 0.79 X10(3)/MCL (ref 0.1–1.3)
MONOCYTES NFR BLD AUTO: 5.7 %
NEUTROPHILS # BLD AUTO: 9.32 X10(3)/MCL (ref 2.1–9.2)
NEUTROPHILS NFR BLD AUTO: 67.7 %
NRBC BLD AUTO-RTO: 0 %
PLATELET # BLD AUTO: 265 X10(3)/MCL (ref 130–400)
PMV BLD AUTO: 9.7 FL (ref 7.4–10.4)
POTASSIUM SERPL-SCNC: 3.6 MMOL/L (ref 3.5–5.1)
PROT SERPL-MCNC: 7.2 GM/DL (ref 6.4–8.3)
RBC # BLD AUTO: 5.02 X10(6)/MCL (ref 4.7–6.1)
SODIUM SERPL-SCNC: 141 MMOL/L (ref 136–145)
WBC # BLD AUTO: 13.77 X10(3)/MCL (ref 4.5–11.5)

## 2024-12-30 PROCEDURE — 80053 COMPREHEN METABOLIC PANEL: CPT | Performed by: PHYSICIAN ASSISTANT

## 2024-12-30 PROCEDURE — 83690 ASSAY OF LIPASE: CPT | Performed by: PHYSICIAN ASSISTANT

## 2024-12-30 PROCEDURE — 96372 THER/PROPH/DIAG INJ SC/IM: CPT | Performed by: PHYSICIAN ASSISTANT

## 2024-12-30 PROCEDURE — 63600175 PHARM REV CODE 636 W HCPCS: Performed by: PHYSICIAN ASSISTANT

## 2024-12-30 PROCEDURE — 99284 EMERGENCY DEPT VISIT MOD MDM: CPT | Mod: 25

## 2024-12-30 PROCEDURE — 85025 COMPLETE CBC W/AUTO DIFF WBC: CPT | Performed by: PHYSICIAN ASSISTANT

## 2024-12-30 RX ORDER — PROMETHAZINE HYDROCHLORIDE 25 MG/ML
25 INJECTION, SOLUTION INTRAMUSCULAR; INTRAVENOUS
Status: COMPLETED | OUTPATIENT
Start: 2024-12-30 | End: 2024-12-30

## 2024-12-30 RX ORDER — KETOROLAC TROMETHAMINE 30 MG/ML
30 INJECTION, SOLUTION INTRAMUSCULAR; INTRAVENOUS
Status: COMPLETED | OUTPATIENT
Start: 2024-12-30 | End: 2024-12-30

## 2024-12-30 RX ADMIN — KETOROLAC TROMETHAMINE 30 MG: 30 INJECTION, SOLUTION INTRAMUSCULAR; INTRAVENOUS at 11:12

## 2024-12-30 RX ADMIN — PROMETHAZINE HYDROCHLORIDE 25 MG: 25 INJECTION INTRAMUSCULAR; INTRAVENOUS at 11:12

## 2024-12-31 NOTE — ED PROVIDER NOTES
Encounter Date: 12/30/2024       History     Chief Complaint   Patient presents with    Nausea     Pt reports N/V/D x 12 hours. Hx gastric pacemaker.     Claude Hall is a 44 y.o. male with a PMHx of HIV and gastroparesis with a gastric pacemaker who presents to the ED with complaints of nausea, vomiting and diarrhea that started 12 hours ago. He reports he took reglan earlier at home without improvement of symptoms. He denies chest pain, dysuria, hematuria, flank pain, fever, and chills.  Reports compliance with home meds.       The history is provided by the patient. No  was used.     Review of patient's allergies indicates:   Allergen Reactions    Lorazepam Other (See Comments) and Anxiety     Twitching, involuntary movements.   Other reaction(s): violent twitching, increased anxiety  Loose time, twitching, and he doesn't remember anything.      Adhesive      Past Medical History:   Diagnosis Date    ADHD (attention deficit hyperactivity disorder)     Anemia     Gastroparesis     HIV (human immunodeficiency virus infection)      Past Surgical History:   Procedure Laterality Date    FRACTURE SURGERY  1997    Left arm broken    gastric pacemaker      HEMORRHOID SURGERY      MEDIPORT INSERTION, SINGLE Left     ORCHIOPEXY Left 5/7/2024    Procedure: ORCHIOPEXY;  Surgeon: Marli Jade DO;  Location: [facility];  Service: Urology;  Laterality: Left;     Family History   Problem Relation Name Age of Onset    COPD Mother Nia     Diabetes Mother Nia     No Known Problems Father       Social History     Tobacco Use    Smoking status: Light Smoker     Current packs/day: 0.25     Average packs/day: 0.3 packs/day for 57.0 years (14.2 ttl pk-yrs)     Types: Cigarettes     Start date: 1988    Smokeless tobacco: Never    Tobacco comments:     Starting the process of quitting again.   Substance Use Topics    Alcohol use: Yes     Comment: social    Drug use: Not Currently     Types:  Methamphetamines, Marijuana     Review of Systems   Constitutional:  Negative for chills, fatigue and fever.   HENT:  Negative for congestion, ear pain, sinus pain and sore throat.    Eyes:  Negative for pain.   Respiratory:  Negative for cough, chest tightness and shortness of breath.    Cardiovascular:  Negative for chest pain.   Gastrointestinal:  Positive for diarrhea, nausea and vomiting. Negative for abdominal pain and constipation.   Genitourinary:  Negative for dysuria.   Musculoskeletal:  Negative for back pain and joint swelling.   Skin:  Negative for color change and rash.   Neurological:  Negative for dizziness and weakness.   Psychiatric/Behavioral:  Negative for behavioral problems and confusion.        Physical Exam     Initial Vitals [12/30/24 2204]   BP Pulse Resp Temp SpO2   (!) 170/107 83 18 98.3 °F (36.8 °C) 97 %      MAP       --         Physical Exam    Nursing note and vitals reviewed.  Constitutional: He appears well-developed and well-nourished.   HENT:   Head: Normocephalic and atraumatic.   Right Ear: External ear normal.   Left Ear: External ear normal.   Nose: Nose normal.   Eyes: Conjunctivae and EOM are normal.   Neck: Neck supple. No thyromegaly present. No tracheal deviation present.   Cardiovascular:  Normal rate, regular rhythm and normal heart sounds.     Exam reveals no gallop and no friction rub.       No murmur heard.  Pulmonary/Chest: Breath sounds normal. No respiratory distress. He has no wheezes. He has no rhonchi. He has no rales. He exhibits no tenderness.   Abdominal: Abdomen is soft. He exhibits no distension. There is no abdominal tenderness.   Healed abdominal scar, gastric pacemaker palpated without tenderness    Musculoskeletal:      Cervical back: Neck supple.     Neurological: He is alert and oriented to person, place, and time. GCS score is 15. GCS eye subscore is 4. GCS verbal subscore is 5. GCS motor subscore is 6.   Skin: Skin is warm. Capillary refill takes  less than 2 seconds. No rash and no abscess noted. No erythema. No pallor.   Psychiatric: He has a normal mood and affect.         ED Course   Procedures  Labs Reviewed   COMPREHENSIVE METABOLIC PANEL - Abnormal       Result Value    Sodium 141      Potassium 3.6      Chloride 105      CO2 27      Glucose 120 (*)     Blood Urea Nitrogen 8.1 (*)     Creatinine 0.97      Calcium 9.7      Protein Total 7.2      Albumin 4.1      Globulin 3.1      Albumin/Globulin Ratio 1.3      Bilirubin Total 0.4      ALP 74      ALT 16      AST 17      eGFR >60      Anion Gap 9.0      BUN/Creatinine Ratio 8     CBC WITH DIFFERENTIAL - Abnormal    WBC 13.77 (*)     RBC 5.02      Hgb 14.7      Hct 44.2      MCV 88.0      MCH 29.3      MCHC 33.3      RDW 15.0      Platelet 265      MPV 9.7      Neut % 67.7      Lymph % 23.1      Mono % 5.7      Eos % 2.8      Basophil % 0.4      Lymph # 3.18      Neut # 9.32 (*)     Mono # 0.79      Eos # 0.38      Baso # 0.06      IG# 0.04      IG% 0.3      NRBC% 0.0     LIPASE - Normal    Lipase Level 27     CBC W/ AUTO DIFFERENTIAL    Narrative:     The following orders were created for panel order CBC Auto Differential.  Procedure                               Abnormality         Status                     ---------                               -----------         ------                     CBC with Differential[[id number]]       Abnormal            Final result                 Please view results for these tests on the individual orders.          Imaging Results    None          Medications   promethazine injection 25 mg (25 mg Intramuscular Given 12/30/24 2316)   ketorolac injection 30 mg (30 mg Intramuscular Given 12/30/24 2316)     Medical Decision Making  DDX: gastroparesis, gastroenteritis, flu, among others    Claude Hall is a 44 y.o. male with a PMHx of HIV and gastroparesis with a gastric pacemaker who presents to the ED with complaints of nausea, vomiting and diarrhea that started 12  hours ago. He reports he took reglan earlier at home without improvement of symptoms. He denies chest pain, dysuria, hematuria, flank pain, fever, and chills.  Reports compliance with home meds.     Hospital Course: CBC with a leukocytosis of 13, likely reactive. CMP wnl. No electrolyte disturbance. Lipase wnl. Patient given Phenergan and Toradol in the ED and reports improvement of symptoms. No vomiting in the ED. Patietn states he has Phenergan at home. Strict return precautions given. Stable for discharge.     Amount and/or Complexity of Data Reviewed  Labs: ordered.    Risk  Prescription drug management.                                      Clinical Impression:  Final diagnoses:  [K31.84] Gastroparesis (Primary)          ED Disposition Condition    Discharge Stable          ED Prescriptions    None       Follow-up Information       Follow up With Specialties Details Why Contact Info    [facility]  In 1 week  [address] [address]    [facility] Emergency Medicine In 3 days As needed, If symptoms worsen [facility] [address] [phone]             Nadia France PA-C  12/31/24 0017

## 2025-02-18 DIAGNOSIS — K31.84 GASTROPARESIS: ICD-10-CM

## 2025-02-19 RX ORDER — METOCLOPRAMIDE 10 MG/1
TABLET ORAL
Qty: 60 TABLET | Refills: 10 | Status: SHIPPED | OUTPATIENT
Start: 2025-02-19

## 2025-02-25 ENCOUNTER — PATIENT MESSAGE (OUTPATIENT)
Dept: INFECTIOUS DISEASES | Facility: CLINIC | Age: 45
End: 2025-02-25

## 2025-03-19 DIAGNOSIS — B20 HIV DISEASE: ICD-10-CM

## 2025-03-20 RX ORDER — DORAVIRINE 100 MG/1
1 TABLET, FILM COATED ORAL
Qty: 30 TABLET | Refills: 1 | Status: SHIPPED | OUTPATIENT
Start: 2025-03-20

## 2025-04-09 ENCOUNTER — HOSPITAL ENCOUNTER (EMERGENCY)
Facility: HOSPITAL | Age: 45
Discharge: HOME | End: 2025-04-10
Attending: INTERNAL MEDICINE
Payer: COMMERCIAL

## 2025-04-09 DIAGNOSIS — L02.01 ABSCESS OF CHIN: Primary | ICD-10-CM

## 2025-04-09 PROCEDURE — 99283 EMERGENCY DEPT VISIT LOW MDM: CPT

## 2025-04-09 NOTE — Clinical Note
"Claude Colone" Rafael was seen and treated in our emergency department on 4/9/2025.  He may return to work on 04/11/2025.       If you have any questions or concerns, please don't hesitate to call.      RAQUEL Bronson RN    "

## 2025-04-10 VITALS
TEMPERATURE: 97 F | SYSTOLIC BLOOD PRESSURE: 154 MMHG | WEIGHT: 141 LBS | HEIGHT: 72 IN | OXYGEN SATURATION: 98 % | BODY MASS INDEX: 19.1 KG/M2 | DIASTOLIC BLOOD PRESSURE: 92 MMHG | RESPIRATION RATE: 19 BRPM | HEART RATE: 110 BPM

## 2025-04-10 RX ORDER — SULFAMETHOXAZOLE AND TRIMETHOPRIM 800; 160 MG/1; MG/1
1 TABLET ORAL 2 TIMES DAILY
Qty: 14 TABLET | Refills: 0 | Status: SHIPPED | OUTPATIENT
Start: 2025-04-10 | End: 2025-04-17

## 2025-04-10 NOTE — ED PROVIDER NOTES
Encounter Date: 4/9/2025       History     Chief Complaint   Patient presents with    Abscess     Patient reports to the ER w/ an abscess to chin as well as another abscess to right forearm. He admits to a hx of hiv but states he's been compliant and is undetectable. He also reports having subjective fevers starting around noon today.      A 44 y.o. male patient with a history of ADHD, HIV, anemia presents to the ED with abscess to chin. Patient states he gets abscesses all over frequently, and just recently drained one himself on his left forearm. Patient states it has healed, but a new one came up on his chin. It started draining today. Patient has been doing warm compresses. He admits subjective fever, but has not checked his temperature. His temp in ED is afebrile.       The history is provided by the patient.     Review of patient's allergies indicates:   Allergen Reactions    Lorazepam Other (See Comments) and Anxiety     Twitching, involuntary movements.   Other reaction(s): violent twitching, increased anxiety  Loose time, twitching, and he doesn't remember anything.      Adhesive      Past Medical History:   Diagnosis Date    ADHD (attention deficit hyperactivity disorder)     Anemia     Gastroparesis     HIV (human immunodeficiency virus infection)      Past Surgical History:   Procedure Laterality Date    FRACTURE SURGERY  1997    Left arm broken    gastric pacemaker      HEMORRHOID SURGERY      MEDIPORT INSERTION, SINGLE Left     ORCHIOPEXY Left 5/7/2024    Procedure: ORCHIOPEXY;  Surgeon: Marli Jade DO;  Location: [facility];  Service: Urology;  Laterality: Left;     Family History   Problem Relation Name Age of Onset    COPD Mother Nia     Diabetes Mother Nia     No Known Problems Father       Social History[1]  Review of Systems   Constitutional:  Negative for chills and fever.   Eyes:  Negative for visual disturbance.   Respiratory:  Negative for shortness of breath.    Cardiovascular:   Negative for chest pain.   Gastrointestinal:  Negative for nausea and vomiting.   Genitourinary:  Negative for difficulty urinating and dysuria.   Musculoskeletal:  Negative for arthralgias.   Skin:  Positive for wound. Negative for color change and rash.   Neurological:  Negative for weakness and headaches.   Hematological:  Does not bruise/bleed easily.   Psychiatric/Behavioral:  Negative for confusion.    All other systems reviewed and are negative.      Physical Exam     Initial Vitals [04/09/25 2349]   BP Pulse Resp Temp SpO2   (!) 154/92 (!) 115 19 97.4 °F (36.3 °C) 96 %      MAP       --         Physical Exam    Nursing note and vitals reviewed.  Constitutional: He appears well-developed and well-nourished.   HENT:   Head: Normocephalic and atraumatic.       Right Ear: External ear normal.   Left Ear: External ear normal.   Nose: Nose normal.   Fluctuant abscess approximately 2x2 cm to chin, actively draining. Tender to palpation. Overlying erythema.    Eyes: Conjunctivae and EOM are normal.   Neck: Neck supple.   Cardiovascular:  Normal rate, regular rhythm and normal heart sounds.     Exam reveals no gallop and no friction rub.       No murmur heard.  Pulmonary/Chest: Breath sounds normal. No respiratory distress. He has no wheezes. He has no rhonchi. He has no rales.   Abdominal: He exhibits no distension.   Musculoskeletal:      Cervical back: Neck supple.     Neurological: He is alert and oriented to person, place, and time. GCS eye subscore is 4. GCS verbal subscore is 5. GCS motor subscore is 6.   Skin: Skin is warm and dry. Capillary refill takes less than 2 seconds.         ED Course   Procedures  Labs Reviewed - No data to display       Imaging Results    None          Medications - No data to display  Medical Decision Making  A 44 y.o. male patient with a history of ADHD, HIV, anemia presents to the ED with abscess to chin. Patient states he gets abscesses all over frequently, and just recently  drained one himself on his left forearm. Patient states it has healed, but a new one came up on his chin. It started draining today. Patient has been doing warm compresses. He admits subjective fever, but has not checked his temperature. His temp in ED is afebrile.       Abscess is actively draining and does not need I&D at this time.    Clinical impression:  Abscess of chin (Primary)    Patient is non-toxic appearing and tolerating nutritional intake. Patient's vital signs and clinical condition are stable for DC with ED Prescriptions     Medication Sig Dispense Start Date End Date Auth. Provider    sulfamethoxazole-trimethoprim 800-160mg (BACTRIM DS) 800-160 mg Tab Take   1 tablet by mouth 2 (two) times daily. for 7 days 14 tablet 4/10/2025   4/17/2025 Donna Phelan PA         Follow-up: PCP or Internal medicine clinic within 3 days  Referrals made: none    Strict follow-up precautions given. Patient verbalizes understanding of treatment plan and ED return precautions.         Risk  Prescription drug management.  Risk Details: Given strict ED return precautions. I have spoken with the patient and/or caregivers. I have explained the patient's condition, diagnoses and treatment plan based on the information available to me at this time. I have answered the patient's and/or caregiver's questions and addressed any concerns. The patient and/or caregivers have as good an understanding of the patient's diagnosis, condition and treatment plan as can be expected at this point. The patient's condition is stable and appropriate for discharge from the emergency department.      The patient will pursue further outpatient evaluation with the primary care physician or other designated or consulting physician as outlined in the discharge instructions. The patient and/or caregivers are agreeable to this plan of care and follow-up instructions have been explained in detail. The patient and/or caregivers have received these  instructions in written format and have expressed an understanding of the discharge instructions. The patient and/or caregivers are aware that any significant change in condition or worsening of symptoms should prompt an immediate return to this or the closest emergency department or a call to 911.               Additional MDM:   Differential Diagnosis:   Other: The following diagnoses were also considered and will be evaluated: dermatitis, abscess and cellulitis.                                   Clinical Impression:  Final diagnoses:  [L02.01] Abscess of chin (Primary)          ED Disposition Condition    Discharge Stable          ED Prescriptions       Medication Sig Dispense Start Date End Date Auth. Provider    sulfamethoxazole-trimethoprim 800-160mg (BACTRIM DS) 800-160 mg Tab Take 1 tablet by mouth 2 (two) times daily. for 7 days 14 tablet 4/10/2025 4/17/2025 Donna Phelan PA          Follow-up Information       Follow up With Specialties Details Why Contact Info Additional Information    [facility] Emergency Medicine Go to  If symptoms worsen, As needed [facility] [facility] [address] [phone]     [facility] Internal Medicine Call in 3 days  [address] [address] [phone] Internal Medicine Clinic Entrance #1               [1]   Social History  Tobacco Use    Smoking status: Light Smoker     Current packs/day: 0.25     Average packs/day: 0.3 packs/day for 57.3 years (14.3 ttl pk-yrs)     Types: Cigarettes     Start date: 1988    Smokeless tobacco: Never    Tobacco comments:     Starting the process of quitting again.   Substance Use Topics    Alcohol use: Yes     Comment: social    Drug use: Not Currently     Types: Methamphetamines, Marijuana        Donna Phelan PA  04/10/25 0157

## 2025-04-14 ENCOUNTER — HOSPITAL ENCOUNTER (EMERGENCY)
Facility: HOSPITAL | Age: 45
Discharge: HOME | End: 2025-04-14
Attending: EMERGENCY MEDICINE
Payer: COMMERCIAL

## 2025-04-14 VITALS
BODY MASS INDEX: 18.01 KG/M2 | OXYGEN SATURATION: 98 % | DIASTOLIC BLOOD PRESSURE: 93 MMHG | TEMPERATURE: 100 F | HEIGHT: 72 IN | SYSTOLIC BLOOD PRESSURE: 145 MMHG | WEIGHT: 132.94 LBS | RESPIRATION RATE: 20 BRPM | HEART RATE: 97 BPM

## 2025-04-14 DIAGNOSIS — N50.82 SCROTAL PAIN: ICD-10-CM

## 2025-04-14 DIAGNOSIS — L03.818 CELLULITIS OF OTHER SPECIFIED SITE: ICD-10-CM

## 2025-04-14 DIAGNOSIS — N50.89 SCROTAL ULCER: Primary | ICD-10-CM

## 2025-04-14 PROCEDURE — 99284 EMERGENCY DEPT VISIT MOD MDM: CPT | Mod: 25

## 2025-04-14 RX ORDER — CIPROFLOXACIN 500 MG/1
500 TABLET ORAL 2 TIMES DAILY
Qty: 12 TABLET | Refills: 0 | Status: SHIPPED | OUTPATIENT
Start: 2025-04-14 | End: 2025-04-20

## 2025-04-14 NOTE — Clinical Note
"Claude Trevino" Rafael was seen and treated in our emergency department on 4/14/2025.  He may return to work on 04/15/2025.       If you have any questions or concerns, please don't hesitate to call.       RN    "

## 2025-04-15 NOTE — ED PROVIDER NOTES
"Encounter Date: 4/14/2025       History     Chief Complaint   Patient presents with    Testicle Pain     Testicle and scrotal swelling x3 days     Chief Complaint  Testicular swelling and pain for over a year, "ulcer" on right side of scrotum    History of Present Illness  Claude Hall presents to the Emergency Department with complaints of testicular swelling and discomfort, which he reports have been ongoing since an orthopedic accident and subsequent surgery approximately one year ago. The patient describes swelling in the scrotal area, particularly on the right side, where he also notes an ulcer or lesion.    Mr. Hall reports that the swelling extends from the groin area down to the scrotum. He expresses concern that his testicle sometimes feels out of place, stating, "It's almost like my testicle, I can almost feel it poking out, like it's not in the right place sometimes." The patient has been managing his symptoms by applying ice for extended periods, often "for days at a time."    The patient's history includes a surgery for what he believes was diagnosed as torsion or cryptorchidism (undescended testicle) performed by Dr. Johnson. He states that he has been experiencing recurrent pain since the surgery and has made multiple attempts to follow up with his surgeon. However, he reports dissatisfaction with the responses received, stating, "I wasn't getting any answers from up there." Additionally, he mentions that his surgeon is no longer covered by his insurance.    Mr. Hall has a history of recent Emergency Department visits, including one a couple of days ago for a chin abscess. He also mentions having a gastric pacemaker and being on extensive medication, though the specifics are not provided. The patient denies any HIV-related issues when asked about his CD4 count.      Review of patient's allergies indicates:   Allergen Reactions    Lorazepam Other (See Comments) and Anxiety     Twitching, " involuntary movements.   Other reaction(s): violent twitching, increased anxiety  Loose time, twitching, and he doesn't remember anything.      Adhesive      Past Medical History:   Diagnosis Date    ADHD (attention deficit hyperactivity disorder)     Anemia     Gastroparesis     HIV (human immunodeficiency virus infection)      Past Surgical History:   Procedure Laterality Date    FRACTURE SURGERY  1997    Left arm broken    gastric pacemaker      HEMORRHOID SURGERY      MEDIPORT INSERTION, SINGLE Left     ORCHIOPEXY Left 5/7/2024    Procedure: ORCHIOPEXY;  Surgeon: Marli Jade DO;  Location: [facility];  Service: Urology;  Laterality: Left;     Family History   Problem Relation Name Age of Onset    COPD Mother Nia     Diabetes Mother Nia     No Known Problems Father       Social History[1]  Review of Systems   Constitutional:  Positive for fatigue. Negative for chills and fever.   HENT: Negative.  Negative for congestion, sore throat and trouble swallowing.    Eyes:  Negative for discharge and visual disturbance.   Respiratory:  Negative for shortness of breath.    Gastrointestinal:  Negative for abdominal pain, diarrhea and vomiting.   Endocrine: Negative.    Genitourinary:  Negative for flank pain and hematuria.   Musculoskeletal:  Negative for myalgias.   Skin:  Positive for rash and wound.   Neurological:  Positive for headaches. Negative for dizziness.   Psychiatric/Behavioral:  Negative for hallucinations and suicidal ideas.    All other systems reviewed and are negative.      Physical Exam     Initial Vitals [04/14/25 1327]   BP Pulse Resp Temp SpO2   (!) 156/92 (!) 111 20 100 °F (37.8 °C) 97 %      MAP       --         Physical Exam    Constitutional: He appears well-developed and well-nourished. No distress.   HENT:   Head: Normocephalic and atraumatic.   Eyes: EOM are normal. Pupils are equal, round, and reactive to light.   Neck: Trachea normal. Neck supple.    Full passive range of motion  without pain.     Cardiovascular:  Normal rate, regular rhythm and normal pulses.           Musculoskeletal:      Cervical back: Full passive range of motion without pain and neck supple.      Comments: No deformity, Nl ROM     Lymphadenopathy:     He has no cervical adenopathy.   Neurological: He is alert and oriented to person, place, and time. He has normal strength. GCS eye subscore is 4. GCS verbal subscore is 5. GCS motor subscore is 6.   Skin: Skin is warm and intact. Capillary refill takes less than 2 seconds.   Skin: Swelling noted in the scrotal area. Ulcer present on the right side of the scrotum.   Psychiatric: He is not actively hallucinating. He expresses no homicidal and no suicidal ideation.         ED Course   Procedures  Labs Reviewed - No data to display       Imaging Results              US Scrotum And Testicles (Final result)  Result time 04/14/25 15:21:48      Final result by Lauren Da Silva MD (04/14/25 15:21:48)                   Impression:      Diffuse scrotal wall thickening.  No drainable fluid collection.      Electronically signed by: Lauren Da Silva  Date:    04/14/2025  Time:    15:21               Narrative:    EXAMINATION:  US SCROTUM AND TESTICLES    CLINICAL HISTORY:  Scrotal pain    TECHNIQUE:  Sonography of the scrotum and testes.  Grayscale, color Doppler and spectral Doppler evaluation.    COMPARISON:  Ultrasound dated 06/11/2024    FINDINGS:  RIGHT TESTICLE:    Size: 3 x 2 x 2.4 cm    Appearance: Heterogeneous appearance without focal mass.    Flow: Normal arterial and venous flow    Epididymis: Normal.    Hydrocele: None.    Varicocele: None.    LEFT TESTICLE:    Size: 3.4 x 2.2 x 2.4 cm    Appearance: Heterogeneous appearance without focal mass.    Flow: Normal arterial and venous flow    Epididymis: Normal.    Hydrocele: None.    Varicocele: None.    OTHER: Diffuse scrotal wall thickening.                                       Medications - No data to  "display  Medical Decision Making  Claude Hall presents with chronic testicular swelling and pain following orthopedic surgery approximately one year ago. The patient reports recurrent swelling and discomfort, with a sensation of his testicle "poking out." Examination revealed significant scrotal swelling and an ulcer on the right side of the scrotum, which appears to be the primary source of pain. Given the patient's history of cryptorchidism and previous surgical intervention, there is concern for potential complications or recurrence. The chronic nature of the symptoms, coupled with the visible ulceration, suggests a need for urological evaluation. A call to the urology service is planned to assess the patient's condition, particularly given the lack of improvement with previous treatments and the patient's frequent emergency department visits for this issue. The differential diagnosis includes post-surgical complications, recurrent cryptorchidism, testicular torsion, or infection. The presence of the scrotal ulcer adds complexity to the case and requires careful evaluation to determine its etiology and relationship to the patient's chronic symptoms.    Scrotal swelling and pain  Assessment: Patient presents with chronic scrotal swelling and pain following orthopedic surgery approximately one year ago. He reports recurrent pain and swelling, with a sensation of his testicle "poking out" or being out of place. Patient has sought care multiple times for this issue, including recent emergency department visits. An ulcer or lesion on the right side of the scrotum was noted during examination. Patient reports using ice packs for symptom management. Previous evaluations by his surgeon have not provided satisfactory answers or resolution.    Amount and/or Complexity of Data Reviewed  Radiology: ordered and independent interpretation performed.    Risk  OTC drugs.  Prescription drug management.               ED " Course as of 04/15/25 0640   Mon Apr 14, 2025   1745 Scrotal cellulitis with ulceration  Assessment: Ultrasound findings are consistent with scrotal cellulitis, demonstrating scrotal wall thickening. A right-sided ulcer is present on the hemiscrotum without associated abscess. Testicular blood flow is normal bilaterally. Given the patient's history of HIV, there is increased concern for chancroid. The presence of tender, multiple lesions in other areas of the body is noted as curious. Patient is already on Bactrim for presumptive staph associated cellulitis lesions. Antibiotics will be added to cover for possible chancroid infection. Discussed use of probiotics to aid in preservation of gut noe while on multiple antibiotics.  Plan:  - Continue current Bactrim regimen  - Administer ceftriaxone 1 gram  - Prescribe ciprofloxacin 500 mg for 3 days  - Recommend probiotic supplementation  - Follow up with urology [DB]      ED Course User Index  [DB] Dao Murphy MD                           Clinical Impression:  Final diagnoses:  [N50.82] Scrotal pain  [N50.89] Scrotal ulcer (Primary)  [L03.818] Cellulitis of other specified site          ED Disposition Condition    Discharge Stable          ED Prescriptions       Medication Sig Dispense Start Date End Date Auth. Provider    ciprofloxacin HCl (CIPRO) 500 MG tablet Take 1 tablet (500 mg total) by mouth 2 (two) times daily. for 6 days 12 tablet 4/14/2025 4/20/2025 Dao Murphy MD    Saccharomyces boulardii-FOS 5 billion cell- 200 mg Cap Take 1 capsule by mouth Daily. 30 capsule 4/14/2025 -- Dao Murphy MD          Follow-up Information       Follow up With Specialties Details Why Contact Info    Follow up with the urology clinic  In 1 week For re-evaluation, For wound re-check              Dao Murphy MD  04/15/25 0638         [1]   Social History  Tobacco Use    Smoking status: Light Smoker     Current packs/day: 0.25     Average packs/day: 0.3  packs/day for 57.3 years (14.3 ttl pk-yrs)     Types: Cigarettes     Start date: 1988    Smokeless tobacco: Never    Tobacco comments:     Starting the process of quitting again.   Substance Use Topics    Alcohol use: Yes     Comment: social    Drug use: Not Currently     Types: Methamphetamines, Marijuana        Dao Murphy MD  04/15/25 0648

## 2025-06-01 ENCOUNTER — ON-DEMAND VIRTUAL (OUTPATIENT)
Dept: URGENT CARE | Facility: CLINIC | Age: 45
End: 2025-06-01
Payer: COMMERCIAL

## 2025-06-01 DIAGNOSIS — Z20.2 EXPOSURE TO SYPHILIS: Primary | ICD-10-CM

## 2025-06-01 DIAGNOSIS — R21 RASH: ICD-10-CM

## 2025-06-01 PROCEDURE — 98004 SYNCH AUDIO-VIDEO EST SF 10: CPT | Mod: 95,,, | Performed by: NURSE PRACTITIONER

## 2025-06-01 RX ORDER — DOXYCYCLINE 100 MG/1
100 CAPSULE ORAL EVERY 12 HOURS
Qty: 28 CAPSULE | Refills: 0 | Status: SHIPPED | OUTPATIENT
Start: 2025-06-01 | End: 2025-06-15

## 2025-08-20 ENCOUNTER — TELEPHONE (OUTPATIENT)
Dept: INFECTIOUS DISEASES | Facility: CLINIC | Age: 45
End: 2025-08-20
Payer: COMMERCIAL

## 2025-08-20 DIAGNOSIS — Z86.19 HISTORY OF SYPHILIS: ICD-10-CM

## 2025-08-20 DIAGNOSIS — B20 HIV DISEASE: Primary | ICD-10-CM

## 2025-08-20 DIAGNOSIS — B20 HIV DISEASE: ICD-10-CM

## 2025-08-20 DIAGNOSIS — E55.9 VITAMIN D DEFICIENCY: ICD-10-CM

## 2025-08-20 DIAGNOSIS — Z11.3 ENCOUNTER FOR SCREENING FOR INFECTIONS WITH A PREDOMINANTLY SEXUAL MODE OF TRANSMISSION: ICD-10-CM

## 2025-08-20 DIAGNOSIS — F41.9 ANXIETY DISORDER, UNSPECIFIED TYPE: ICD-10-CM

## 2025-08-20 DIAGNOSIS — Z86.19 HISTORY OF HEPATITIS C: ICD-10-CM

## 2025-08-20 DIAGNOSIS — R79.9 ABNORMAL FINDING OF BLOOD CHEMISTRY, UNSPECIFIED: ICD-10-CM

## 2025-08-20 DIAGNOSIS — K31.84 GASTROPARESIS: ICD-10-CM

## 2025-08-20 RX ORDER — DORAVIRINE 100 MG/1
1 TABLET, FILM COATED ORAL DAILY
Qty: 30 TABLET | Refills: 0 | Status: SHIPPED | OUTPATIENT
Start: 2025-08-20

## 2025-08-26 ENCOUNTER — OFFICE VISIT (OUTPATIENT)
Dept: INFECTIOUS DISEASES | Facility: CLINIC | Age: 45
End: 2025-08-26
Payer: MEDICARE

## 2025-08-26 ENCOUNTER — LAB VISIT (OUTPATIENT)
Dept: LAB | Facility: HOSPITAL | Age: 45
End: 2025-08-26
Attending: NURSE PRACTITIONER
Payer: MEDICARE

## 2025-08-26 VITALS
RESPIRATION RATE: 10 BRPM | HEIGHT: 72 IN | TEMPERATURE: 97 F | BODY MASS INDEX: 17.91 KG/M2 | SYSTOLIC BLOOD PRESSURE: 137 MMHG | WEIGHT: 132.25 LBS | HEART RATE: 93 BPM | DIASTOLIC BLOOD PRESSURE: 88 MMHG

## 2025-08-26 DIAGNOSIS — B20 HIV DISEASE: Primary | ICD-10-CM

## 2025-08-26 DIAGNOSIS — Z86.19 HISTORY OF HEPATITIS C: ICD-10-CM

## 2025-08-26 DIAGNOSIS — Z86.19 HISTORY OF SYPHILIS: ICD-10-CM

## 2025-08-26 DIAGNOSIS — K31.84 GASTROPARESIS: ICD-10-CM

## 2025-08-26 PROCEDURE — 1160F RVW MEDS BY RX/DR IN RCRD: CPT | Mod: CPTII,,, | Performed by: NURSE PRACTITIONER

## 2025-08-26 PROCEDURE — 3008F BODY MASS INDEX DOCD: CPT | Mod: CPTII,,, | Performed by: NURSE PRACTITIONER

## 2025-08-26 PROCEDURE — 99214 OFFICE O/P EST MOD 30 MIN: CPT | Mod: S$PBB,,, | Performed by: NURSE PRACTITIONER

## 2025-08-26 PROCEDURE — 3079F DIAST BP 80-89 MM HG: CPT | Mod: CPTII,,, | Performed by: NURSE PRACTITIONER

## 2025-08-26 PROCEDURE — 3075F SYST BP GE 130 - 139MM HG: CPT | Mod: CPTII,,, | Performed by: NURSE PRACTITIONER

## 2025-08-26 PROCEDURE — 99214 OFFICE O/P EST MOD 30 MIN: CPT | Mod: PBBFAC | Performed by: NURSE PRACTITIONER

## 2025-08-26 PROCEDURE — 1159F MED LIST DOCD IN RCRD: CPT | Mod: CPTII,,, | Performed by: NURSE PRACTITIONER

## 2025-08-28 DIAGNOSIS — B20 HIV DISEASE: ICD-10-CM

## 2025-08-28 RX ORDER — DORAVIRINE 100 MG/1
1 TABLET, FILM COATED ORAL DAILY
Qty: 30 TABLET | Refills: 4 | Status: SHIPPED | OUTPATIENT
Start: 2025-08-28

## 2025-08-28 RX ORDER — EMTRICITABINE AND TENOFOVIR ALAFENAMIDE 200; 25 MG/1; MG/1
1 TABLET ORAL DAILY
Qty: 30 TABLET | Refills: 4 | Status: SHIPPED | OUTPATIENT
Start: 2025-08-28

## (undated) DEVICE — SUT CHROMIC 3-0 SH 27IN GUT

## (undated) DEVICE — SOL NACL IRR 1000ML BTL

## (undated) DEVICE — NDL HYPO REG 25G X 1 1/2

## (undated) DEVICE — SYR 10CC LUER LOCK

## (undated) DEVICE — GLOVE SIGNATURE MICRO LTX 6.5

## (undated) DEVICE — SUT PROLENE 4-0 SH BLU 36IN

## (undated) DEVICE — KIT SURGICAL TURNOVER

## (undated) DEVICE — YANKAUER FLEX NO VENT REG CAP

## (undated) DEVICE — MANIFOLD 4 PORT

## (undated) DEVICE — TRAY SKIN SCRUB WET PREMIUM

## (undated) DEVICE — GLOVE SENSICARE PI GRN 7.5

## (undated) DEVICE — Device

## (undated) DEVICE — GAUZE VASELINE PETRO 3X18IN

## (undated) DEVICE — HEMOSTAT SURGICEL PWD 3G

## (undated) DEVICE — GLOVE SENSICARE PI GRN 6.5

## (undated) DEVICE — GLOVE SIGNATURE MICRO LTX 7